# Patient Record
Sex: FEMALE | Race: WHITE | NOT HISPANIC OR LATINO | Employment: OTHER | URBAN - METROPOLITAN AREA
[De-identification: names, ages, dates, MRNs, and addresses within clinical notes are randomized per-mention and may not be internally consistent; named-entity substitution may affect disease eponyms.]

---

## 2024-01-15 PROBLEM — E21.3 HYPERPARATHYROIDISM: Status: ACTIVE | Noted: 2023-05-01

## 2024-01-23 ENCOUNTER — PATIENT MESSAGE (OUTPATIENT)
Dept: FAMILY MEDICINE CLINIC | Facility: CLINIC | Age: 74
End: 2024-01-23
Payer: MEDICARE

## 2024-01-23 DIAGNOSIS — R30.0 DYSURIA: Primary | ICD-10-CM

## 2024-01-25 ENCOUNTER — LAB (OUTPATIENT)
Dept: LAB | Facility: HOSPITAL | Age: 74
End: 2024-01-25
Payer: MEDICARE

## 2024-01-25 ENCOUNTER — OFFICE VISIT (OUTPATIENT)
Dept: ORTHOPEDIC SURGERY | Facility: CLINIC | Age: 74
End: 2024-01-25
Payer: MEDICARE

## 2024-01-25 VITALS
DIASTOLIC BLOOD PRESSURE: 68 MMHG | BODY MASS INDEX: 25.57 KG/M2 | HEIGHT: 61 IN | SYSTOLIC BLOOD PRESSURE: 102 MMHG | WEIGHT: 135.4 LBS

## 2024-01-25 DIAGNOSIS — M81.0 AGE-RELATED OSTEOPOROSIS WITHOUT CURRENT PATHOLOGICAL FRACTURE: ICD-10-CM

## 2024-01-25 DIAGNOSIS — M75.121 NONTRAUMATIC COMPLETE TEAR OF RIGHT ROTATOR CUFF: Primary | ICD-10-CM

## 2024-01-25 DIAGNOSIS — R30.0 DYSURIA: ICD-10-CM

## 2024-01-25 DIAGNOSIS — M25.511 CHRONIC RIGHT SHOULDER PAIN: ICD-10-CM

## 2024-01-25 DIAGNOSIS — M19.011 PRIMARY OSTEOARTHRITIS OF RIGHT SHOULDER: ICD-10-CM

## 2024-01-25 DIAGNOSIS — M67.921 TENDINOPATHY OF RIGHT BICEPS TENDON: ICD-10-CM

## 2024-01-25 DIAGNOSIS — M25.812 IMPINGEMENT OF LEFT SHOULDER: ICD-10-CM

## 2024-01-25 DIAGNOSIS — G89.29 CHRONIC RIGHT SHOULDER PAIN: ICD-10-CM

## 2024-01-25 PROCEDURE — 36415 COLL VENOUS BLD VENIPUNCTURE: CPT

## 2024-01-25 PROCEDURE — 87086 URINE CULTURE/COLONY COUNT: CPT

## 2024-01-25 PROCEDURE — 87186 SC STD MICRODIL/AGAR DIL: CPT

## 2024-01-25 PROCEDURE — 87077 CULTURE AEROBIC IDENTIFY: CPT

## 2024-01-25 PROCEDURE — 82306 VITAMIN D 25 HYDROXY: CPT

## 2024-01-25 NOTE — PROGRESS NOTES
[facility] Follow Up       Office Follow Up Visit       Patient Name: Colleen Morgan    Chief Complaint:   Chief Complaint   Patient presents with    Follow-up     1 month f/u; Injury of right shoulder       Referring Physician: No ref. provider found    History of Present Illness:   Colleen Morgan returns to clinic today for follow-up of right shoulder pain.  Last visit on 12/28/2023.  MRI of right shoulder was pending at that time.  Performed on 1/14/2024.  She says right shoulder pain has remained unchanged since last visit.    12/28/23  Ongoing for over 1 year.  Progressively worsened recently.  Initially started in February 2022.     Rates pain an 8/10.  Describes aching, shooting.  Worsens when lying on the right side.  She has tried physical therapy, injection and anti-inflammatories.     She was initially seen at [facility] for the right shoulder pain.  Referred to physical therapy.  She says the physical therapy did not help with her symptoms.  She then moved down to the [place] area to be with her now .  Referred here for further evaluation.     MRI ordered by her PCP scheduled for 1/14/2024    Subjective     Review of Systems   Constitutional:  Negative for chills, fever, unexpected weight gain and unexpected weight loss.   HENT:  Negative for congestion, postnasal drip and rhinorrhea.    Eyes:  Negative for blurred vision.   Respiratory:  Negative for shortness of breath.    Cardiovascular:  Negative for leg swelling.   Gastrointestinal:  Negative for abdominal pain, nausea and vomiting.   Genitourinary:  Negative for difficulty urinating.   Musculoskeletal:  Positive for arthralgias. Negative for gait problem, joint swelling and myalgias.   Skin:  Negative for skin lesions and wound.   Neurological:  Negative for dizziness, weakness, light-headedness and numbness.   Hematological:  Does not bruise/bleed  easily.   Psychiatric/Behavioral:  Negative for depressed mood.         I have reviewed and updated the following portions of the patient's history and review of systems: allergies, current medications, past family history, past medical history, past social history, past surgical history and problem list.    Medications:   Current Outpatient Medications:     Alcaftadine 0.25 % solution, Apply  to eye(s) as directed by provider., Disp: , Rfl:     atomoxetine (STRATTERA) 40 MG capsule, Take 1 capsule by mouth Daily., Disp: 90 capsule, Rfl: 0    Biotin 1 MG capsule, Take 1 tablet by mouth Daily., Disp: , Rfl:     buPROPion (WELLBUTRIN) 75 MG tablet, Take 1 tablet by mouth Daily., Disp: 90 tablet, Rfl: 0    butalbital-acetaminophen-caffeine (FIORICET, ESGIC) -40 MG per tablet, Take 1 tablet by mouth Every 4 (Four) Hours As Needed for Headache., Disp: , Rfl:     Cholecalciferol 50 MCG (2000 UT) capsule, Take  by mouth., Disp: , Rfl:     cycloSPORINE (RESTASIS) 0.05 % ophthalmic emulsion, Apply 1 drop to eye(s) as directed by provider Every 12 (Twelve) Hours., Disp: , Rfl:     dicyclomine (BENTYL) 10 MG/5ML syrup, , Disp: , Rfl:     estradiol (ESTRACE) 0.1 MG/GM vaginal cream, Insert 2 applicators into the vagina 2 (Two) Times a Week., Disp: 42.5 g, Rfl: 5    famotidine (PEPCID) 40 MG tablet, Take 1 tablet by mouth At Night As Needed for Heartburn., Disp: 90 tablet, Rfl: 1    levocetirizine (XYZAL) 5 MG tablet, Take 1 tablet by mouth Every Evening., Disp: 90 tablet, Rfl: 3    loteprednol (Lotemax) 0.5 % ophthalmic suspension, Apply 1 drop to eye(s) as directed by provider 3 (Three) Times a Day., Disp: , Rfl:     meloxicam (MOBIC) 15 MG tablet, , Disp: , Rfl:     montelukast (SINGULAIR) 10 MG tablet, Take 1 tablet by mouth., Disp: , Rfl:     naratriptan (AMERGE) 2.5 MG tablet, Take 1 tablet by mouth 1 (One) Time As Needed for Migraine. 2.5 mg at onset of headache, may repeat in 4 hours if needed, Disp: , Rfl:      "nebivolol (BYSTOLIC) 10 MG tablet, Take 1 tablet by mouth Daily., Disp: 90 tablet, Rfl: 1    Olmesartan-amLODIPine-HCTZ 40-5-25 MG tablet, Take 1 tablet by mouth Daily., Disp: 90 tablet, Rfl: 1    Omega-3 Fatty Acids (fish oil) 1200 MG capsule capsule, , Disp: , Rfl:     pantoprazole (PROTONIX) 40 MG EC tablet, Take 1 tablet by mouth Daily., Disp: , Rfl:     rosuvastatin (Crestor) 5 MG tablet, Take 1 tablet by mouth Daily., Disp: 90 tablet, Rfl: 1    TiZANidine (ZANAFLEX) 2 MG capsule, Take 1 capsule by mouth 3 (Three) Times a Day., Disp: , Rfl:     venlafaxine XR (EFFEXOR-XR) 75 MG 24 hr capsule, Take 3 capsules by mouth Daily., Disp: 270 capsule, Rfl: 1    Allergies:   Allergies   Allergen Reactions    Ace Inhibitors Anaphylaxis and Other (See Comments)     Taken with avelox - throat swelling    Fluticasone Other (See Comments)     \"Increases heart rate, dizziness, felt like something is not right\"    Moxifloxacin Other (See Comments)     When taken with With lisinopril- throat swelling    Pravastatin Myalgia     Stopped per Dr Ernandez due to leg cramps severe         Objective      Vital Signs:   Vitals:    01/25/24 1259   BP: 102/68   Weight: 61.4 kg (135 lb 6.4 oz)   Height: 154.9 cm (60.98\")       Ortho Exam:  General: no acute distress, comfortable  Vitals reviewed in chart    Musculoskeletal Exam:    SIDE: Right shoulder    Tenderness: Anterior biceps pain and rotator cuff    Range of motion measurements (degrees): 140/140/60/40  Painful arc of motion: Yes  Positive amelia's testing  Positive neer's  Positive speed's  Painful bicipital groove  No evidence of septic joint      Results Review:  MRI Shoulder Right Without Contrast  Narrative: MRI SHOULDER RIGHT WO CONTRAST    Date of Exam: 1/14/2024 1:52 PM EST    Indication: Shoulder pain, rotator cuff disorder suspected, xray done.     Comparison: Shoulder radiographs 12/28/2023    Technique:  Routine multiplanar/multisequence images of the right shoulder were " obtained without contrast administration.      Findings:  Acromioclavicular joint: Mild degenerative changes of the acromioclavicular joint. Mild  subacromial/subdeltoid bursal fluid/edema. Type I acromion. Os acromiale is absent.    Rotator cuff: Rotator cuff tendinopathy with high-grade articular-sided partial tear of the distal supraspinatus tendon with more focal full-thickness tear of the anterior distal fibers of the supraspinatus tendon. High-grade articular sided partial tear   of the distal superior fibers of the infraspinatus tendon. Tendinopathy of the subscapularis tendon. No tear or substantial tendinopathy of the teres minor tendon. No fatty atrophy or edematous changes of the the rotator cuff muscles.    Labrum: Degeneration and tearing of the labrum.    Biceps tendon: Intra-articular long head of biceps tendinopathy and partial tearing.    Joint: Moderate glenohumeral joint effusion with synovitis. Mild chondral thinning of the glenohumeral joint. No subchondral edema. No evidence of capsulitis.    Bones:  No fracture or marrow edema. No suspicious marrow replacing lesions.    Soft tissues: No soft tissue masses or fluid collections seen.  Impression: Impression:  Rotator cuff tendinopathy with high-grade articular-sided partial tear of the distal supraspinatus tendon with more focal full-thickness tear along the anterior distal fibers. This tear propagates posteriorly to involve the superior distal fibers of   infraspinatus as well. No focal muscular atrophy.    Intra-articular long head of biceps tendinopathy and partial tearing.    Moderate glenohumeral joint effusion with synovitis.    Electronically Signed: Kin Barrios MD    1/16/2024 1:17 PM EST    Workstation ID: [id number]         Assessment / Plan      Assessment:   Diagnoses and all orders for this visit:    1. Nontraumatic complete tear of right rotator cuff (Primary)  -     External Facility Surgical/Procedural Request; Future    2.  "Tendinopathy of right biceps tendon  -     External Facility Surgical/Procedural Request; Future    3. Primary osteoarthritis of right shoulder  -     External Facility Surgical/Procedural Request; Future    4. Impingement of left shoulder  -     External Facility Surgical/Procedural Request; Future    5. Chronic right shoulder pain  -     External Facility Surgical/Procedural Request; Future        Quality Metrics:   BMI:   BMI is >= 25 and <30. (Overweight) The following options were offered after discussion;: weight loss educational material (shared in after visit summary)       Tobacco:   Colleen Morgan  reports that she has never smoked. She has been exposed to tobacco smoke. She has never used smokeless tobacco..      MRI right shoulder 1/14/2024 reviewed today with Dr. Vann.  Evidence of focal full-thickness tearing of supraspinatus.  Partial tearing of subscapularis and infraspinatus.  Tendinopathy and partial tearing of biceps tendon.  Moderate glenohumeral joint arthritis with synovitis.    Risks and benefits of continued nonoperative management versus surgical management were discussed. The patient desires to proceed with operative intervention.    Rotator cuff repair, biceps tenodesis and sometimes subacromial decompression discussed. We discussed that sometimes the rotator cuff tear is not repairable and may require debridement or partial repair. The procedure is routinely done arthroscopically, but sometimes a larger incision needs to be made to complete the repair in an \"open\" fashion for rare cases.    Specific risks include pain, bleeding, infection, injury to surrounding nerve and blood vessels, fracture, failure or lack of healing of rotator cuff repair, incomplete pain relief, hardware failure, potential need for additional procedures, stiffness after surgery, and potential inability to restore range of motion and strength. Medical, anesthetic, and block complications were additionally discussed. "     General anesthesia is required, sling compliance, and compliance with physical therapy and/or a surgeon guided exercise program will be very important to the recovery process.    Discussed pain medications would be used in the early postoperative course.    Diagnoses and CPT Codes  1. Rotator cuff tear - Arthroscopic rotator cuff repair CPT Code 36495  2. Biceps tendinopathy - biceps tenodesis  3. Impingement right shoulder - subacromial decompression    Ultrasling provided today for postoperative use    We will obtain cardiac clearance -- cardiologist in [place] area. May need to establish with cardiologist here.    Follow Up:   RIGHT SHOULDER SURGERY    Sonia Quinones PA-C  [facility]    Dictated using Dragon Speech Recognition.

## 2024-01-26 LAB — 25(OH)D3 SERPL-MCNC: 41.8 NG/ML (ref 30–100)

## 2024-01-27 LAB — BACTERIA SPEC AEROBE CULT: ABNORMAL

## 2024-01-28 DIAGNOSIS — N30.00 ACUTE CYSTITIS WITHOUT HEMATURIA: Primary | ICD-10-CM

## 2024-01-28 RX ORDER — CEFDINIR 300 MG/1
300 CAPSULE ORAL 2 TIMES DAILY
Qty: 14 CAPSULE | Refills: 0 | Status: SHIPPED | OUTPATIENT
Start: 2024-01-28 | End: 2024-02-04

## 2024-02-19 ENCOUNTER — OUTSIDE FACILITY SERVICE (OUTPATIENT)
Dept: ORTHOPEDIC SURGERY | Facility: CLINIC | Age: 74
End: 2024-02-19
Payer: MEDICARE

## 2024-02-19 ENCOUNTER — DOCUMENTATION (OUTPATIENT)
Dept: ORTHOPEDIC SURGERY | Facility: CLINIC | Age: 74
End: 2024-02-19

## 2024-02-19 DIAGNOSIS — M19.011 PRIMARY OSTEOARTHRITIS OF RIGHT SHOULDER: ICD-10-CM

## 2024-02-19 DIAGNOSIS — Z98.890 STATUS POST RIGHT ROTATOR CUFF REPAIR: Primary | ICD-10-CM

## 2024-02-19 DIAGNOSIS — M25.812 IMPINGEMENT OF LEFT SHOULDER: ICD-10-CM

## 2024-02-19 DIAGNOSIS — M67.921 TENDINOPATHY OF RIGHT BICEPS TENDON: ICD-10-CM

## 2024-02-19 DIAGNOSIS — M75.121 NONTRAUMATIC COMPLETE TEAR OF RIGHT ROTATOR CUFF: ICD-10-CM

## 2024-02-19 PROCEDURE — 29827 SHO ARTHRS SRG RT8TR CUF RPR: CPT | Performed by: ORTHOPAEDIC SURGERY

## 2024-02-19 PROCEDURE — 29828 SHO ARTHRS SRG BICP TENODSIS: CPT | Performed by: ORTHOPAEDIC SURGERY

## 2024-02-19 PROCEDURE — 29823 SHO ARTHRS SRG XTNSV DBRDMT: CPT | Performed by: ORTHOPAEDIC SURGERY

## 2024-02-19 PROCEDURE — 29823 SHO ARTHRS SRG XTNSV DBRDMT: CPT

## 2024-02-19 PROCEDURE — 29826 SHO ARTHRS SRG DECOMPRESSION: CPT | Performed by: ORTHOPAEDIC SURGERY

## 2024-02-19 PROCEDURE — 29827 SHO ARTHRS SRG RT8TR CUF RPR: CPT

## 2024-02-19 PROCEDURE — 29828 SHO ARTHRS SRG BICP TENODSIS: CPT

## 2024-02-19 PROCEDURE — 29826 SHO ARTHRS SRG DECOMPRESSION: CPT

## 2024-02-19 RX ORDER — HYDROCODONE BITARTRATE AND ACETAMINOPHEN 10; 325 MG/1; MG/1
1 TABLET ORAL EVERY 4 HOURS PRN
Qty: 42 TABLET | Refills: 0 | Status: SHIPPED | OUTPATIENT
Start: 2024-02-19 | End: 2024-02-26

## 2024-02-19 RX ORDER — ONDANSETRON 4 MG/1
4 TABLET, FILM COATED ORAL EVERY 6 HOURS PRN
Qty: 30 TABLET | Refills: 1 | Status: SHIPPED | OUTPATIENT
Start: 2024-02-19 | End: 2024-02-27

## 2024-02-19 NOTE — PROGRESS NOTES
Operative Report     Side/SHOULDER: RIGHT SHOULDER    LOCATION:   [facility]  [address] [place] [address]    [facility] OPERATIVE REPORT       Surgeon: Regan Vann MD     Assistant: DARINEL De La Fuente     The skilled assistance of the above noted first assistant was necessary during this complex surgical procedure.  The surgical assistant assisted with every aspect of the operation including, but not limited to, proper and safe positioning of the patient, obtaining adequate surgical exposure, manipulation of surgical instruments, suture management, surgical knot tying when necessary, the continual process of hemostasis during the procedure itself in addition to surgical wound closure and removal of the patient from the operating table and returning the patient back to the [facility].  The assistance of the surgical assistant allowed me to perform the most sensitive and technical potions of this operation using 2 hands, thus enhancing efficiency and patient safety.  This would not be possible without the help of a skilled assistant familiar with the procedure and capable of safely performing the aforementioned tasks.     Date of Surgery: 2/19/2024  Shoulder: RIGHT shoulder  Preoperative Diagnosis:  1.     Rotator cuff tear, chronic dysplastic - treated with arthroscopic rotator cuff repair - CPT 55304  2.     Biceps tendinopathy, partial tearing, SLAP tearing - treated with arthroscopic biceps tenodesis - CPT 88033  3.     Glenohumeral joint arthritis - treated with arthroscopic extensive debridement - CPT 17486  4.     Shoulder impingement syndrome - treated with arthroscopic subacromial decompression with acromioplasty - CPT 90094     Postoperative Diagnosis:  1.     SAME as preoperative diagnoses     Procedure:  1.     Arthroscopic rotator cuff repair (1x2) - CPT 41149  2.     Arthroscopic biceps tenodesis (8-8) - CPT 53430  3.    Arthroscopic  extensive debridement - CPT 21381  4.     Arthroscopic subacromial decompression with acromioplasty - CPT 46303        Admission status: elective outpatient surgery     COVID-19 Statement: The patient understands that the surgery is elective surgery.  Patient is aware of the ongoing COVID-19 pandemic and potential implications.     Complications: None     EBL: 10mL     Specimen: NONE     Anesthesia: general anesthesia     Block: regional interscalene block with single shot per anesthesia     Antibiotics: weight based IV antibiotics infused prior to incision     Time out: Time out was called and the patient, side, site, and intended procedures were confirmed.     Counts: Needle and sponge counts were correct prior to closure.     DVT prophylaxis: The patient will be weight bearing as tolerated on bilateral lower extremities postoperatively with no additional chemical DVT prophylaxis indicated.     Marked: The patient was marked with an indelible marker in the preoperative holding area confirming the correct side and site.     History & Physical:   A history and physical examination was completed and updated in the preoperative holding area.     Consent: The patient signed the consent form for surgery with an understanding of the risks and benefits as outlined in the clinic and in the preoperative holding area.     Risks and Benefits: Specific risks and benefits discussed included - pain, bleeding, infection, injury to nerves or blood vessels, fracture, stiffness, failure to heal, revision surgery, deformity of biceps or asymmetry, complications of the block, anesthetic complications, and medical complications associated with surgery.        Indications for surgery:  The patient has history, physical examination, and radiographic findings confirming the diagnoses.  The patient has persistent pain and functional loss unresponsive to non-operative treatment consisting of selective rest/activity modification,  medications, and exercises.  MRI documented the status of the rotator cuff - rotator cuff tearing was noted.     Impingement syndrome - the patient had history and clinical exam findings consistent with shoulder impingement syndrome.  Additionally, the patient had subacromial bursitis which was encountered during the arthroscopic shoulder procedure.  Subacromial decompression with minimal acromioplasty was indicated as part of the treatment of impingement syndrome, and additionally to enhance arthroscopic visualization to enable minimally invasive, arthroscopic rotator cuff repair.    Great discussion with the patient in the office and again in preop today.  She has fairly significant glenohumeral joint arthritis.  She did have some baseline anterior static subluxation on her axillary image but her subscapularis was intact on MRI and at the time of surgery.  She had chronic dysplastic full-thickness tearing the supraspinatus extending posteriorly.  She had partial tearing of the long head of the biceps with severe pathology.  She also had significant glenohumeral joint arthritis.  She understands that some of the arthritic pain can persist despite debridement but felt that she is a better candidate for arthroscopic surgery rather than reverse shoulder arthroplasty at this time based on MRI findings and functional status.        Operative Findings:  Rotator Cuff Tissue Quality: Chronic dysplastic tissues     Status of the Rotator Cuff:  Supraspinatus -full-thickness tearing with posterior extension    Size of Rotator Cuff Tear:  Supraspinatus -14 to 16 mm     Rotator Cuff Repair Construct:  1x2 Transosseous Equivalent Double Row Arthroscopic Rotator Cuff Repair      Rotator Cuff Implants:  Medial Row: 1 triple loaded [employer] 5.5mm Healicoil PEEK-all 6 suture limbs were utilized  Lateral Row: 2 Lateral Anchors - [employer] 5.5mm Footprint PEEK      Bone Quality: Despite advanced age she had excellent anchor  fixation for all 3 anchors and the biceps tendon.     Labrum: Degenerative SLAP tearing, degenerative labral tearing fairly circumferentially     Biceps: tendinopathy and synovitis and partial rotator cuff tearing     Biceps Tenodesis Construct:  Suprapectoral biceps tenodesis in the bicipital groove     Biceps Tendon Implant:  Arthrex SwiveLock Biceps Tenodesis BioComposite screw  Drill Size: 8mm  Screw Size: 8mm     Humeral Head: Grade 4 changes  Glenoid: Grade 4 changes     Contracture: Negative despite OA  Pathological laxity: Negative     Procedure in detail:  Regional interscalene block was completed in the preoperative area by the anesthesia team.  The patient was supine on the operative table and the anesthesia team initiated general anesthesia.  The patient was placed in a modified beach chair position with the neck secured in neutral alignment and all bony prominences were well padded.     The shoulder was assessed with an examination under anesthesia.     The operative extremity was cleaned with alcohol and then the extremity was prepped and draped in the standard fashion.  The surgical arm was placed into a well-padded arm mohr. A standard posterior portal was created with an incision made 1 cm inferior 1 cm medial to the posterolateral acromial corner.  A blunt metal trocar and cannula were inserted into the glenohumeral joint.  The arthroscopic pump was connected and the above findings and diagnoses were noted as part of the diagnostic shoulder arthroscopy.       A spinal needle was used to localize the anterior portal position in the rotator interval. A 5.5mm plastic cannula and trocar were inserted followed by a 4.5mm shaver/cautery device.  The shaver was used for debridement in the glenohumeral joint.  Arthroscopic scissors were used to perform biceps tenotomy of the pathologic biceps tendon prior to subsequent biceps tenodesis.  The remaining intra-articular portion of the biceps tendon was  debrided using the shaver/cautery device.    Extensive debridement was necessary within the glenohumeral joint.  Extensive debridement was performed using the shaver and cautery device for chondroplasty on the glenoid side, chondroplasty on the humeral side, debridement of chronic labral tearing, debridement of synovitis within the rotator interval.     The arthroscope and metal cannula were then removed in order to transition to the subacromial space.  The blunt metal trocar and cannula were inserted into the subacromial space and the lateral portal site identified with a spinal needle.  An 8 mm plastic cannula and trocar were inserted.  I turned my attention to the arthroscopic subacromial decompression with acromioplasty. Bursitis was noted in the subacromial space and impacted visualization of the rotator cuff.  The 4.5mm shaver/cautery device was used to clear the subacromial space until the acromion could be identified and bursal resection was completed to allow rotator cuff visualization.  The shaver was used to remove fibrous tissue from the acromial undersurface until the anterolateral and anterior medial corners of the acromion were clearly identified.  The coracoacromial ligament was not released but CA ligament tearing was noted and debrided as part of the subacromial decompression.  The shaver was used to perform a minimal acromioplasty.  The shaver/cautery device was used to perform the subacromial decompression with minimal acromioplasty.     I turned my attention to the arthroscopic biceps tenodesis. The arm was placed in a slightly external rotator position and the elbow flexed and shoulder forward flexed into a biceps tendon repair position.  The biceps tendon repair position in achieved in order to try to maintain proper biceps tendon length-tension relationship during the repair.  The biceps sheath was opened using the shaver/cautery device and the pathologic biceps tendon was visualized.  The  appropriately sized drill bit was used to create a drill hole for the tendon above the pectoralis major tendon and within the bicipital groove.  The biceps tendon was placed into the drill hole and the screw inserted and tested.  The arthroscopic biceps tenodesis was completed and the repair was noted to be dynamically stable with a solid repair.     I turned my attention to the rotator cuff tear and the arthroscopic rotator cuff repair.  The torn rotator cuff tendon was grasped with a handheld grasper and assessed for mobility and integrity.  The soft tissue at the greater tuberosity insertion site was removed and a power shaver was used to create a healthy bleeding bed to enhance rotator cuff tendon healing.     Arthroscopic rotator cuff suture anchors were then inserted for the rotator cuff repair.  Chronic dysplastic features noted.  The single medial row arthroscopic rotator cuff repair anchor was inserted and the sutures from the anchors were withdrawn through the anterior cannula. An arthroscopic suture passer was used to place the high strength sutures through the rotator cuff tendon in an inverted mattress fashion. The sutures were then inserted laterally into 2 lateral knotless anchors in a cruciform pattern with appropriate tensioning.  The completed arthroscopic rotator cuff repair was inspected dynamically and the arthroscopic instruments removed along with the arthroscopic fluid. The incisions were closed with nylon suture and steri-strips were placed over the incisions.  A sterile dressing was applied followed by a neutral rotation sling.  The patient tolerated the procedure well and was transported to the recovery room in satisfactory condition.       Rotator Cuff Repair - POSTOPERATIVE PLAN:  Follow-up in the office in 2 weeks with 1 view of the operative shoulder at that time  Sutures: Nylon sutures to come out in 2 weeks  DVT prophylaxis: No additional chemical DVT prophylaxis  indicated  Weightbearing: Nonweightbearing on operative extremity, no biceps loading, pendulums/elbow/wrist/hand motion encouraged  Neutral rotation sling for 3 to 4 weeks following the surgery  Physical therapy: Formal outpatient physical therapy will be provided at the 2-week appointment in the office.  Rotator cuff repair protocol      Electronically signed by Regan Vann MD, 02/19/24, 10:14 AM EST.

## 2024-02-23 ENCOUNTER — TELEPHONE (OUTPATIENT)
Age: 74
End: 2024-02-23
Payer: MEDICARE

## 2024-02-23 NOTE — TELEPHONE ENCOUNTER
I harjinder the items below are for surgery and I will let the patient know that.  Are you ok if she goes to [place] to recoup? See Below    Aaliyah OVALLES (R)      From Patient:    Nataliya, My brother and sister in law have invited me to recuperate at their [place] vacation home. Would that be permissable?

## 2024-02-23 NOTE — TELEPHONE ENCOUNTER
Yes- those listed items are the requested materials for surgery. Thank you for relaying that.    Recovery in [place] is certainly reasonable. Is she asking about going now? It is usually best if we can see her at the 2 week postop appointment to go over instructions and provide physical therapy order. Hopefully she can find a PT clinic while there.    Thank you,  Sonia Quinones PA-C         Sent message to patient via [employer] with Sonia's comments about holding off to go to [place] until after PO appt.  I also let the patient know that the list of items was for surgery and to ignore that.    Aaliyah JAY (GUZMAN) ROT

## 2024-02-23 NOTE — TELEPHONE ENCOUNTER
----- Message from Colleen Kimball sent at 2/22/2024 10:18 PM EST -----  Regarding: [place]   Contact: [phone]  Sonia I received a detailed message on my chart. It was a list of recommendations for a beach chair with hydraulic arm, cannulas etc. there is no explanation as to what they are for or directions for use. Please advise. Thanks!

## 2024-03-05 ENCOUNTER — OFFICE VISIT (OUTPATIENT)
Dept: ORTHOPEDIC SURGERY | Facility: CLINIC | Age: 74
End: 2024-03-05
Payer: MEDICARE

## 2024-03-05 VITALS — TEMPERATURE: 97.1 F

## 2024-03-05 DIAGNOSIS — Z98.890 STATUS POST RIGHT ROTATOR CUFF REPAIR: Primary | ICD-10-CM

## 2024-03-05 PROCEDURE — 99024 POSTOP FOLLOW-UP VISIT: CPT

## 2024-03-05 NOTE — PROGRESS NOTES
[facility] [place] [facility] Office Visit - Sonia Quinones PA-C    Office Visit       Patient Name: Colleen Kimball    Chief Complaint:   Chief Complaint   Patient presents with    Post-op     2 weeks s/p arthroscopic rotator cuff repair, arthroscopic biceps tenodesis, arthroscopic extensive debridement, arthroscopic subacromial decompression with acromioplasty, Right; DOS (02/19/2024)       Referring Physician: No ref. provider found    History of Present Illness:   Colleen Kimball is a 73 y.o. female who presents for 2-week postop visit s/p right rotator cuff repair, biceps tenodesis, extensive debridement, subacromial decompression with Dr. Vann.  Here with supportive .  She reports to be doing well.  Pain has been controlled.  She has been wearing her sling as instructed.    Procedure:  1.     Arthroscopic rotator cuff repair (1x2) - CPT 84181  2.     Arthroscopic biceps tenodesis (8-8) - CPT 95809  3.    Arthroscopic extensive debridement - CPT 38644  4.     Arthroscopic subacromial decompression with acromioplasty - CPT 41640    Subjective     Review of Systems     Past Medical History: History reviewed. No pertinent past medical history.    Past Surgical History:   Past Surgical History:   Procedure Laterality Date    APPENDECTOMY  1968    CATARACT EXTRACTION Bilateral 2012    HYSTERECTOMY  1996    SHOULDER ARTHROSCOPY Right 02/19/2024    arthroscopic rotator cuff repair, arthroscopic biceps tenodesis, arthroscopic extensive debridement, arthroscopic subacromial decompression with acromioplasty, Dr. Regan Vann    SINUS SURGERY      x 2    TONSILLECTOMY AND ADENOIDECTOMY         Family History:   Family History   Problem Relation Age of Onset    Diabetes Mother     Arthritis Mother     Hypertension Mother     Osteoarthritis Mother     Heart failure Father     Bipolar disorder Father        Social History:   Social History      Socioeconomic History    Marital status:    Tobacco Use    Smoking status: Never     Passive exposure: Past    Smokeless tobacco: Never   Substance and Sexual Activity    Alcohol use: Yes     Alcohol/week: 7.0 standard drinks of alcohol     Types: 7 Glasses of wine per week     Comment: One time daily    Drug use: Never    Sexual activity: Defer       Medications:   Current Outpatient Medications:     Alcaftadine 0.25 % solution, Apply  to eye(s) as directed by provider., Disp: , Rfl:     atomoxetine (STRATTERA) 40 MG capsule, Take 1 capsule by mouth Daily., Disp: 90 capsule, Rfl: 0    Biotin 1 MG capsule, Take 1 tablet by mouth Daily., Disp: , Rfl:     buPROPion (WELLBUTRIN) 75 MG tablet, Take 1 tablet by mouth Daily., Disp: 90 tablet, Rfl: 0    butalbital-acetaminophen-caffeine (FIORICET, ESGIC) -40 MG per tablet, Take 1 tablet by mouth Every 4 (Four) Hours As Needed for Headache., Disp: , Rfl:     Cholecalciferol 50 MCG (2000 UT) capsule, Take  by mouth., Disp: , Rfl:     cycloSPORINE (RESTASIS) 0.05 % ophthalmic emulsion, Apply 1 drop to eye(s) as directed by provider Every 12 (Twelve) Hours., Disp: , Rfl:     dicyclomine (BENTYL) 10 MG/5ML syrup, , Disp: , Rfl:     estradiol (ESTRACE) 0.1 MG/GM vaginal cream, Insert 2 applicators into the vagina 2 (Two) Times a Week., Disp: 42.5 g, Rfl: 5    famotidine (PEPCID) 40 MG tablet, Take 1 tablet by mouth At Night As Needed for Heartburn., Disp: 90 tablet, Rfl: 1    levocetirizine (XYZAL) 5 MG tablet, Take 1 tablet by mouth Every Evening., Disp: 90 tablet, Rfl: 3    loteprednol (Lotemax) 0.5 % ophthalmic suspension, Apply 1 drop to eye(s) as directed by provider 3 (Three) Times a Day., Disp: , Rfl:     meloxicam (MOBIC) 15 MG tablet, , Disp: , Rfl:     montelukast (SINGULAIR) 10 MG tablet, Take 1 tablet by mouth., Disp: , Rfl:     naratriptan (AMERGE) 2.5 MG tablet, Take 1 tablet by mouth 1 (One) Time As Needed for Migraine. 2.5 mg at onset of  "headache, may repeat in 4 hours if needed, Disp: , Rfl:     nebivolol (BYSTOLIC) 10 MG tablet, Take 1 tablet by mouth Daily., Disp: 90 tablet, Rfl: 1    Olmesartan-amLODIPine-HCTZ 40-5-25 MG tablet, Take 1 tablet by mouth Daily., Disp: 90 tablet, Rfl: 1    Omega-3 Fatty Acids (fish oil) 1200 MG capsule capsule, , Disp: , Rfl:     pantoprazole (PROTONIX) 40 MG EC tablet, Take 1 tablet by mouth Daily., Disp: , Rfl:     rosuvastatin (Crestor) 5 MG tablet, Take 1 tablet by mouth Daily., Disp: 90 tablet, Rfl: 1    TiZANidine (ZANAFLEX) 2 MG capsule, Take 1 capsule by mouth 3 (Three) Times a Day., Disp: , Rfl:     venlafaxine XR (EFFEXOR-XR) 75 MG 24 hr capsule, Take 3 capsules by mouth Daily., Disp: 270 capsule, Rfl: 1    Allergies:   Allergies   Allergen Reactions    Ace Inhibitors Anaphylaxis and Other (See Comments)     Taken with avelox - throat swelling    Fluticasone Other (See Comments)     \"Increases heart rate, dizziness, felt like something is not right\"    Moxifloxacin Other (See Comments)     When taken with With lisinopril- throat swelling    Pravastatin Myalgia     Stopped per Dr Ernandez due to leg cramps severe       I have reviewed and updated the following portions of the patient's history and review of systems: allergies, current medications, past family history, past medical history, past social history, past surgical history and problem list.    Objective      Vital Signs:   Vitals:    03/05/24 1306   Temp: 97.1 °F (36.2 °C)       Ortho Exam:  General: comfortable  Vascular: 2+ radial pulse  Neurologic: sensation to light touch is intact distally, elbow flexion/elbow extension/wrist flexion/wrist extension/hand intrinsics intact, able to fire deltoid; no residual defects noted from the block  Dermatologic: surgical incisions are well appearing, with no drainage or surrounding erythema; no signs or symptoms of infection or DVT      Results Review:   XR Shoulder 1 View Right  Imaging: shoulder x-rays 1 " view - AP x-ray views    Side: RIGHT SHOULDER    Indication for shoulder x-ray 1 view: shoulder pain, postop evaluation   following surgery    Comparison: the current xray was compared to preoperative imaging and   indicates expected postoperative changes.    Findings: No acute bony pathology. Well appearing and well positioned   compared to preoperative imaging.  Located and no fracture noted.  Baseline degenerative arthritic findings noted again and noted   intraoperatively.    I personally reviewed the above x-rays.         Assessment / Plan      Assessment:  Diagnoses and all orders for this visit:    1. Status post right rotator cuff repair (Primary)  -     Cancel: XR Shoulder 1 View Right  -     Ambulatory Referral to Physical Therapy Evaluate and treat, Ortho, POST OP        Quality Metrics:   BMI:   BMI is >= 25 and <30. (Overweight) The following options were offered after discussion;: weight loss educational material (shared in after visit summary)       Tobacco:   Colleen Kimball  reports that she has never smoked. She has been exposed to tobacco smoke. She has never used smokeless tobacco..          Plan:  Doing well 2 weeks s/p right rotator cuff repair, biceps tenodesis, extensive debridement, subacromial decompression with Dr. Vann.  We reviewed intraoperative photographs today.  Solid 1x2 rotator cuff repair despite chronic dysplastic features.   She may continue with over-the-counter anti-inflammatories as needed for pain and swelling.  She will continue sling use for an additional week or 2.  May take breaks as needed.  Referral provided today for physical therapy.  Protocol given.  She will start range of motion exercises.  Remain nonweightbearing on right side.  Sutures removed today.    History, diagnosis and treatment plan discussed with Dr. Vann.    Follow Up:   2 months    Sonia Quinones PA-C  [facility] [place] [facility]       Dictated using Dragon Speech Recognition.

## 2024-03-30 DIAGNOSIS — N95.2 VAGINAL ATROPHY: ICD-10-CM

## 2024-04-01 RX ORDER — ESTRADIOL 0.1 MG/G
2 CREAM VAGINAL 2 TIMES WEEKLY
Qty: 42.5 G | Refills: 5 | Status: SHIPPED | OUTPATIENT
Start: 2024-04-01

## 2024-04-01 NOTE — TELEPHONE ENCOUNTER
Rx Refill Note  Requested Prescriptions     Pending Prescriptions Disp Refills    estradiol (ESTRACE) 0.1 MG/GM vaginal cream 42.5 g 5     Sig: Insert 2 g into the vagina 2 (Two) Times a Week.      Last office visit with prescribing clinician: 1/15/2024   Last telemedicine visit with prescribing clinician: Visit date not found   Next office visit with prescribing clinician: Visit date not found   {    Kathleen Sloan MA  04/01/24, 09:00 EDT

## 2024-05-03 ENCOUNTER — PATIENT MESSAGE (OUTPATIENT)
Dept: FAMILY MEDICINE CLINIC | Facility: CLINIC | Age: 74
End: 2024-05-03
Payer: MEDICARE

## 2024-05-03 DIAGNOSIS — J30.2 SEASONAL ALLERGIES: ICD-10-CM

## 2024-05-03 DIAGNOSIS — F33.0 MILD EPISODE OF RECURRENT MAJOR DEPRESSIVE DISORDER: ICD-10-CM

## 2024-05-03 DIAGNOSIS — F98.8 ATTENTION DEFICIT DISORDER, UNSPECIFIED HYPERACTIVITY PRESENCE: Chronic | ICD-10-CM

## 2024-05-03 DIAGNOSIS — E78.2 MIXED HYPERLIPIDEMIA: ICD-10-CM

## 2024-05-03 DIAGNOSIS — N95.2 VAGINAL ATROPHY: ICD-10-CM

## 2024-05-06 RX ORDER — MONTELUKAST SODIUM 10 MG/1
10 TABLET ORAL NIGHTLY
Qty: 90 TABLET | Refills: 0 | Status: SHIPPED | OUTPATIENT
Start: 2024-05-06

## 2024-05-06 RX ORDER — LEVOCETIRIZINE DIHYDROCHLORIDE 5 MG/1
5 TABLET, FILM COATED ORAL EVERY EVENING
Qty: 90 TABLET | Refills: 0 | Status: SHIPPED | OUTPATIENT
Start: 2024-05-06 | End: 2024-05-09 | Stop reason: SDUPTHER

## 2024-05-06 RX ORDER — BUPROPION HYDROCHLORIDE 75 MG/1
75 TABLET ORAL DAILY
Qty: 90 TABLET | Refills: 0 | OUTPATIENT
Start: 2024-05-06

## 2024-05-06 RX ORDER — ATOMOXETINE 40 MG/1
40 CAPSULE ORAL DAILY
Qty: 30 CAPSULE | Refills: 0 | Status: SHIPPED | OUTPATIENT
Start: 2024-05-06

## 2024-05-06 RX ORDER — ATOMOXETINE 40 MG/1
40 CAPSULE ORAL DAILY
Qty: 90 CAPSULE | Refills: 0 | OUTPATIENT
Start: 2024-05-06

## 2024-05-06 RX ORDER — MELOXICAM 15 MG/1
15 TABLET ORAL DAILY
Qty: 90 TABLET | Refills: 0 | Status: SHIPPED | OUTPATIENT
Start: 2024-05-06

## 2024-05-06 RX ORDER — BUPROPION HYDROCHLORIDE 75 MG/1
75 TABLET ORAL DAILY
Qty: 30 TABLET | Refills: 0 | Status: SHIPPED | OUTPATIENT
Start: 2024-05-06

## 2024-05-06 RX ORDER — ROSUVASTATIN CALCIUM 5 MG/1
5 TABLET, COATED ORAL DAILY
Qty: 30 TABLET | Refills: 0 | Status: SHIPPED | OUTPATIENT
Start: 2024-05-06

## 2024-05-06 RX ORDER — PANTOPRAZOLE SODIUM 40 MG/1
40 TABLET, DELAYED RELEASE ORAL DAILY
Qty: 90 TABLET | Refills: 0 | Status: SHIPPED | OUTPATIENT
Start: 2024-05-06

## 2024-05-06 RX ORDER — ESTRADIOL 0.1 MG/G
2 CREAM VAGINAL 2 TIMES WEEKLY
Qty: 42.5 G | Refills: 2 | Status: SHIPPED | OUTPATIENT
Start: 2024-05-06

## 2024-05-08 ENCOUNTER — OFFICE VISIT (OUTPATIENT)
Dept: FAMILY MEDICINE CLINIC | Facility: CLINIC | Age: 74
End: 2024-05-08
Payer: MEDICARE

## 2024-05-08 VITALS
SYSTOLIC BLOOD PRESSURE: 132 MMHG | OXYGEN SATURATION: 97 % | HEART RATE: 61 BPM | DIASTOLIC BLOOD PRESSURE: 74 MMHG | WEIGHT: 140 LBS | HEIGHT: 61 IN | BODY MASS INDEX: 26.43 KG/M2

## 2024-05-08 DIAGNOSIS — N30.00 ACUTE CYSTITIS WITHOUT HEMATURIA: ICD-10-CM

## 2024-05-08 DIAGNOSIS — R30.0 DYSURIA: Primary | ICD-10-CM

## 2024-05-08 LAB
BILIRUB BLD-MCNC: NEGATIVE MG/DL
CLARITY, POC: ABNORMAL
COLOR UR: YELLOW
EXPIRATION DATE: ABNORMAL
GLUCOSE UR STRIP-MCNC: NEGATIVE MG/DL
KETONES UR QL: NEGATIVE
LEUKOCYTE EST, POC: ABNORMAL
Lab: ABNORMAL
NITRITE UR-MCNC: POSITIVE MG/ML
PH UR: 6 [PH] (ref 5–8)
PROT UR STRIP-MCNC: ABNORMAL MG/DL
RBC # UR STRIP: NEGATIVE /UL
SP GR UR: 1.01 (ref 1–1.03)
UROBILINOGEN UR QL: NORMAL

## 2024-05-08 PROCEDURE — 1159F MED LIST DOCD IN RCRD: CPT | Performed by: PHYSICIAN ASSISTANT

## 2024-05-08 PROCEDURE — 99213 OFFICE O/P EST LOW 20 MIN: CPT | Performed by: PHYSICIAN ASSISTANT

## 2024-05-08 PROCEDURE — 81003 URINALYSIS AUTO W/O SCOPE: CPT | Performed by: PHYSICIAN ASSISTANT

## 2024-05-08 PROCEDURE — 3075F SYST BP GE 130 - 139MM HG: CPT | Performed by: PHYSICIAN ASSISTANT

## 2024-05-08 PROCEDURE — 3078F DIAST BP <80 MM HG: CPT | Performed by: PHYSICIAN ASSISTANT

## 2024-05-08 PROCEDURE — 1126F AMNT PAIN NOTED NONE PRSNT: CPT | Performed by: PHYSICIAN ASSISTANT

## 2024-05-08 PROCEDURE — 87186 SC STD MICRODIL/AGAR DIL: CPT | Performed by: PHYSICIAN ASSISTANT

## 2024-05-08 PROCEDURE — 87088 URINE BACTERIA CULTURE: CPT | Performed by: PHYSICIAN ASSISTANT

## 2024-05-08 PROCEDURE — 87086 URINE CULTURE/COLONY COUNT: CPT | Performed by: PHYSICIAN ASSISTANT

## 2024-05-08 PROCEDURE — 1160F RVW MEDS BY RX/DR IN RCRD: CPT | Performed by: PHYSICIAN ASSISTANT

## 2024-05-08 RX ORDER — OMEPRAZOLE 20 MG/1
20 CAPSULE, DELAYED RELEASE ORAL DAILY
COMMUNITY

## 2024-05-08 RX ORDER — SUMATRIPTAN 25 MG/1
25 TABLET, FILM COATED ORAL
COMMUNITY

## 2024-05-08 RX ORDER — CEPHALEXIN 500 MG/1
500 CAPSULE ORAL 2 TIMES DAILY
Qty: 14 CAPSULE | Refills: 0 | Status: SHIPPED | OUTPATIENT
Start: 2024-05-08 | End: 2024-05-15

## 2024-05-09 ENCOUNTER — OFFICE VISIT (OUTPATIENT)
Dept: ORTHOPEDIC SURGERY | Facility: CLINIC | Age: 74
End: 2024-05-09
Payer: MEDICARE

## 2024-05-09 DIAGNOSIS — Z98.890 STATUS POST RIGHT ROTATOR CUFF REPAIR: Primary | ICD-10-CM

## 2024-05-09 PROCEDURE — 99024 POSTOP FOLLOW-UP VISIT: CPT

## 2024-05-09 RX ORDER — LEVOCETIRIZINE DIHYDROCHLORIDE 5 MG/1
5 TABLET, FILM COATED ORAL EVERY EVENING
Qty: 30 TABLET | Refills: 0 | Status: SHIPPED | OUTPATIENT
Start: 2024-05-09

## 2024-05-09 NOTE — PROGRESS NOTES
[facility] [place] [facility] Office Follow Up       Office Follow Up Visit       Patient Name: Colleen Kimball    Chief Complaint:   Chief Complaint   Patient presents with    Post-op     2 month follow up -- 2.5 months status post arthroscopic rotator cuff repair, arthroscopic biceps tenodesis, arthroscopic extensive debridement, arthroscopic subacromial decompression with acromioplasty, Right; DOS (02/19/2024)       Referring Physician: No ref. provider found    History of Present Illness:   Colleen Kimball returns to clinic today for postop visit 2.5 months s/p right shoulder arthroscopic rotator cuff repair, biceps tenodesis, extensive debridement, subacromial decompression with Dr. Vann.  She reports to be overall doing very well.  No pain with the right shoulder at this time.  She has been completing physical therapy at [facility] in [place].  Seeing great progress with range of motion.    Procedure:  1.     Arthroscopic rotator cuff repair (1x2) - CPT 76817  2.     Arthroscopic biceps tenodesis (8-8) - CPT 24301  3.    Arthroscopic extensive debridement - CPT 40817  4.     Arthroscopic subacromial decompression with acromioplasty - CPT 76977      Subjective     Review of Systems   Constitutional: Negative.  Negative for chills, fatigue and fever.   HENT: Negative.  Negative for congestion and dental problem.    Eyes: Negative.  Negative for blurred vision.   Respiratory: Negative.  Negative for shortness of breath.    Cardiovascular: Negative.  Negative for leg swelling.   Gastrointestinal: Negative.  Negative for abdominal pain.   Endocrine: Negative.  Negative for polyuria.   Genitourinary: Negative.  Negative for difficulty urinating.   Musculoskeletal:  Positive for arthralgias.   Skin: Negative.    Allergic/Immunologic: Negative.    Neurological: Negative.    Hematological: Negative.  Negative for adenopathy.   Psychiatric/Behavioral: Negative.   Negative for behavioral problems.         I have reviewed and updated the following portions of the patient's history and review of systems: allergies, current medications, past family history, past medical history, past social history, past surgical history and problem list.    Medications:   Current Outpatient Medications:     Alcaftadine 0.25 % solution, Apply  to eye(s) as directed by provider., Disp: , Rfl:     atomoxetine (STRATTERA) 40 MG capsule, Take 1 capsule by mouth Daily., Disp: 30 capsule, Rfl: 0    Biotin 1 MG capsule, Take 1 tablet by mouth Daily., Disp: , Rfl:     buPROPion (WELLBUTRIN) 75 MG tablet, Take 1 tablet by mouth Daily., Disp: 30 tablet, Rfl: 0    butalbital-acetaminophen-caffeine (FIORICET, ESGIC) -40 MG per tablet, Take 1 tablet by mouth Every 4 (Four) Hours As Needed for Headache., Disp: , Rfl:     cephalexin (Keflex) 500 MG capsule, Take 1 capsule by mouth 2 (Two) Times a Day for 7 days., Disp: 14 capsule, Rfl: 0    Cholecalciferol 50 MCG (2000 UT) capsule, Take  by mouth., Disp: , Rfl:     cycloSPORINE (RESTASIS) 0.05 % ophthalmic emulsion, Apply 1 drop to eye(s) as directed by provider Every 12 (Twelve) Hours., Disp: , Rfl:     dicyclomine (BENTYL) 10 MG/5ML syrup, , Disp: , Rfl:     estradiol (ESTRACE) 0.1 MG/GM vaginal cream, Insert 2 g into the vagina 2 (Two) Times a Week., Disp: 42.5 g, Rfl: 2    famotidine (PEPCID) 40 MG tablet, Take 1 tablet by mouth At Night As Needed for Heartburn., Disp: 90 tablet, Rfl: 1    levocetirizine (XYZAL) 5 MG tablet, Take 1 tablet by mouth Every Evening., Disp: 30 tablet, Rfl: 0    loteprednol (Lotemax) 0.5 % ophthalmic suspension, Apply 1 drop to eye(s) as directed by provider 3 (Three) Times a Day., Disp: , Rfl:     meloxicam (MOBIC) 15 MG tablet, Take 1 tablet by mouth Daily., Disp: 90 tablet, Rfl: 0    montelukast (SINGULAIR) 10 MG tablet, Take 1 tablet by mouth Every Night., Disp: 90 tablet, Rfl: 0    naratriptan (AMERGE) 2.5 MG tablet,  "Take 1 tablet by mouth 1 (One) Time As Needed for Migraine. 2.5 mg at onset of headache, may repeat in 4 hours if needed, Disp: , Rfl:     nebivolol (BYSTOLIC) 10 MG tablet, Take 1 tablet by mouth Daily., Disp: 90 tablet, Rfl: 1    Olmesartan-amLODIPine-HCTZ 40-5-25 MG tablet, Take 1 tablet by mouth Daily., Disp: 90 tablet, Rfl: 1    Omega-3 Fatty Acids (fish oil) 1200 MG capsule capsule, , Disp: , Rfl:     omeprazole (priLOSEC) 20 MG capsule, Take 1 capsule by mouth Daily., Disp: , Rfl:     pantoprazole (PROTONIX) 40 MG EC tablet, Take 1 tablet by mouth Daily., Disp: 90 tablet, Rfl: 0    rosuvastatin (Crestor) 5 MG tablet, Take 1 tablet by mouth Daily., Disp: 30 tablet, Rfl: 0    SUMAtriptan (IMITREX) 25 MG tablet, Take 1 tablet by mouth., Disp: , Rfl:     TiZANidine (ZANAFLEX) 2 MG capsule, Take 1 capsule by mouth 3 (Three) Times a Day., Disp: , Rfl:     venlafaxine XR (EFFEXOR-XR) 75 MG 24 hr capsule, Take 3 capsules by mouth Daily., Disp: 270 capsule, Rfl: 1    Allergies:   Allergies   Allergen Reactions    Ace Inhibitors Anaphylaxis and Other (See Comments)     Taken with avelox - throat swelling    Fluticasone Other (See Comments)     \"Increases heart rate, dizziness, felt like something is not right\"    Moxifloxacin Other (See Comments)     When taken with With lisinopril- throat swelling    Pravastatin Myalgia     Stopped per Dr Ernandez due to leg cramps severe         Objective      Vital Signs: There were no vitals filed for this visit.    Ortho Exam:  General: no acute distress, comfortable  Vitals reviewed in chart    Musculoskeletal Exam:    SIDE: Right shoulder  Incisions well healed    Tenderness: None    Range of motion measurements (degrees): 150/140/60/40  Painful arc of motion: no  Smooth arc  Negative lag sign  No evidence of septic joint      Results Review:  XR Shoulder 1 View Right  Imaging: shoulder x-rays 1 view - AP x-ray views    Side: RIGHT SHOULDER    Indication for shoulder x-ray 1 view: " shoulder pain, postop evaluation   following surgery    Comparison: the current xray was compared to preoperative imaging and   indicates expected postoperative changes.    Findings: No acute bony pathology. Well appearing and well positioned   compared to preoperative imaging.  Located and no fracture noted.  Baseline degenerative arthritic findings noted again and noted   intraoperatively.    I personally reviewed the above x-rays.     I have noted results reviewed from previous encounter.          Assessment / Plan      Assessment:   Diagnoses and all orders for this visit:    1. Status post right rotator cuff repair (Primary)  -     Ambulatory Referral to Physical Therapy for Evaluation & Treatment        Quality Metrics:   BMI:   BMI is >= 25 and <30. (Overweight) The following options were offered after discussion;: weight loss educational material (shared in after visit summary)       Tobacco:   Colleen Kimball  reports that she has never smoked. She has been exposed to tobacco smoke. She has never used smokeless tobacco.        Plan:  She has made great progress 2.5 months s/p right arthroscopic rotator cuff repair, biceps tenodesis, subacromial compression, extensive debridement with Dr. Vann.  Encouraged to continue with physical therapy for additional range of motion and strengthening exercises. New PT order provided. She will go slow and gradual in terms of lifting activity. We will see her back in 2-3 months to reassess.    History, diagnosis and treatment plan discussed with Dr. Vann.    Follow Up:   2-3 months    Sonia Quinones PA-C  [facility] [place] [facility]    Dictated using Dragon Speech Recognition.

## 2024-05-10 LAB — BACTERIA SPEC AEROBE CULT: ABNORMAL

## 2024-05-23 DIAGNOSIS — R30.0 DYSURIA: Primary | ICD-10-CM

## 2024-06-05 ENCOUNTER — OFFICE VISIT (OUTPATIENT)
Dept: FAMILY MEDICINE CLINIC | Facility: CLINIC | Age: 74
End: 2024-06-05
Payer: MEDICARE

## 2024-06-05 VITALS
SYSTOLIC BLOOD PRESSURE: 128 MMHG | BODY MASS INDEX: 27.19 KG/M2 | WEIGHT: 144 LBS | OXYGEN SATURATION: 94 % | HEIGHT: 61 IN | DIASTOLIC BLOOD PRESSURE: 64 MMHG | HEART RATE: 76 BPM | RESPIRATION RATE: 18 BRPM

## 2024-06-05 DIAGNOSIS — N30.01 ACUTE CYSTITIS WITH HEMATURIA: ICD-10-CM

## 2024-06-05 DIAGNOSIS — R35.0 URINE FREQUENCY: ICD-10-CM

## 2024-06-05 DIAGNOSIS — N39.0 RECURRENT UTI: ICD-10-CM

## 2024-06-05 DIAGNOSIS — M79.604 RIGHT LEG PAIN: ICD-10-CM

## 2024-06-05 DIAGNOSIS — R30.0 DYSURIA: Primary | ICD-10-CM

## 2024-06-05 DIAGNOSIS — M81.0 AGE-RELATED OSTEOPOROSIS WITHOUT CURRENT PATHOLOGICAL FRACTURE: ICD-10-CM

## 2024-06-05 LAB
BILIRUB BLD-MCNC: NEGATIVE MG/DL
CLARITY, POC: ABNORMAL
COLOR UR: ABNORMAL
EXPIRATION DATE: ABNORMAL
GLUCOSE UR STRIP-MCNC: NEGATIVE MG/DL
KETONES UR QL: NEGATIVE
LEUKOCYTE EST, POC: ABNORMAL
Lab: ABNORMAL
NITRITE UR-MCNC: NEGATIVE MG/ML
PH UR: 6 [PH] (ref 5–8)
PROT UR STRIP-MCNC: ABNORMAL MG/DL
RBC # UR STRIP: ABNORMAL /UL
SP GR UR: 1.03 (ref 1–1.03)
UROBILINOGEN UR QL: NORMAL

## 2024-06-05 PROCEDURE — 1159F MED LIST DOCD IN RCRD: CPT | Performed by: FAMILY MEDICINE

## 2024-06-05 PROCEDURE — 87077 CULTURE AEROBIC IDENTIFY: CPT | Performed by: PHYSICIAN ASSISTANT

## 2024-06-05 PROCEDURE — 81003 URINALYSIS AUTO W/O SCOPE: CPT | Performed by: FAMILY MEDICINE

## 2024-06-05 PROCEDURE — 87186 SC STD MICRODIL/AGAR DIL: CPT | Performed by: PHYSICIAN ASSISTANT

## 2024-06-05 PROCEDURE — 3078F DIAST BP <80 MM HG: CPT | Performed by: FAMILY MEDICINE

## 2024-06-05 PROCEDURE — 1160F RVW MEDS BY RX/DR IN RCRD: CPT | Performed by: FAMILY MEDICINE

## 2024-06-05 PROCEDURE — 87086 URINE CULTURE/COLONY COUNT: CPT | Performed by: PHYSICIAN ASSISTANT

## 2024-06-05 PROCEDURE — 1125F AMNT PAIN NOTED PAIN PRSNT: CPT | Performed by: FAMILY MEDICINE

## 2024-06-05 PROCEDURE — 3074F SYST BP LT 130 MM HG: CPT | Performed by: FAMILY MEDICINE

## 2024-06-05 PROCEDURE — 99214 OFFICE O/P EST MOD 30 MIN: CPT | Performed by: FAMILY MEDICINE

## 2024-06-05 RX ORDER — LACTOBACILLUS RHAMNOSUS GG 10B CELL
CAPSULE ORAL DAILY
COMMUNITY

## 2024-06-05 RX ORDER — NITROFURANTOIN 25; 75 MG/1; MG/1
100 CAPSULE ORAL 2 TIMES DAILY
Qty: 14 CAPSULE | Refills: 0 | Status: SHIPPED | OUTPATIENT
Start: 2024-06-05 | End: 2024-06-12

## 2024-06-05 RX ORDER — NITROFURANTOIN 25; 75 MG/1; MG/1
CAPSULE ORAL
Qty: 8 CAPSULE | Refills: 2 | Status: SHIPPED | OUTPATIENT
Start: 2024-06-05

## 2024-06-05 NOTE — PROGRESS NOTES
Chief Complaint   Patient presents with    Urine Leakage     Pt states burning while urinating or not urinating      Leg Pain     Pt states Mobic helps some but not enough  Pt states that it is right hip pain that progresses down to her ankle and is a throbbing sensation         Colleen Kimball is a 73 y.o. female who presents for Urine Leakage (Pt states burning while urinating or not urinating/) and Leg Pain (Pt states Mobic helps some but not enough/Pt states that it is right hip pain that progresses down to her ankle and is a throbbing sensation)    Patient reports recurrent UTI.  She was recently treated with Keflex for UTI and she was asymptomatic for a few days.  Is here she is intimate with her , she gets another UTI.  She reports burning and urinary urgency.  She is allergic to fluoroquinolones.    She also reports new onset right leg pain for the last month.  She denies any groin pain.  She reports that the pain starts at her right buttocks and extends into her calf.  She describes it as sharp pain.  Denies numbness or burning.  No known trauma or injury.  Has not been to physical therapy.        Past Medical History:   Diagnosis Date    ADHD (attention deficit hyperactivity disorder)     Allergic     Arthritis     Arthritis of back 2000    Arthritis of neck ?    Asthma     Bursitis of hip 2008    Colon polyp     Coronary artery disease     Depression     GERD (gastroesophageal reflux disease)     Headache     Heart murmur     Hip arthrosis     HL (hearing loss)     Hypertension     Irritable bowel syndrome     Low back pain     Low back strain 2010    Neck strain 2001    Osteopenia     Periarthritis of shoulder 2024    Found in MRI of rotator cuff    Rotator cuff syndrome     Tennis elbow 2000    Urinary tract infection     Visual impairment     Wrist sprain 2005    Resulted from falling       Past Surgical History:   Procedure Laterality Date    ADENOIDECTOMY  1956    With tonsilectomy     "APPENDECTOMY  1968    CARDIAC CATHETERIZATION      CATARACT EXTRACTION Bilateral 2012    COLONOSCOPY  6/2:    Colonoscopy and upper GI    HYSTERECTOMY  1996    SHOULDER ARTHROSCOPY Right 02/19/2024    arthroscopic rotator cuff repair, arthroscopic biceps tenodesis, arthroscopic extensive debridement, arthroscopic subacromial decompression with acromioplasty, Dr. Regan Vann    SHOULDER SURGERY  02/24    Right Rotator cuff, bicep trars    SINUS SURGERY      x 2    TONSILLECTOMY AND ADENOIDECTOMY      TUBAL ABDOMINAL LIGATION  1979       Family History   Problem Relation Age of Onset    Diabetes Mother     Arthritis Mother     Hypertension Mother     Osteoarthritis Mother     Hearing loss Mother     Osteoporosis Mother     Heart failure Father     Bipolar disorder Father     Alcohol abuse Father     Depression Father     Hearing loss Father     Hyperlipidemia Father     Anxiety disorder Daughter     Depression Daughter     Anxiety disorder Daughter     Depression Daughter     Developmental Disability Daughter     Learning disabilities Daughter     Hypertension Brother     Thyroid disease Brother     Anesthesia problems Brother     Clotting disorder Brother     Diabetes Brother        Social History     Socioeconomic History    Marital status:    Tobacco Use    Smoking status: Never     Passive exposure: Past    Smokeless tobacco: Never   Vaping Use    Vaping status: Never Used   Substance and Sexual Activity    Alcohol use: Yes     Alcohol/week: 1.0 - 2.0 standard drink of alcohol     Types: 1 - 2 Glasses of wine per week    Drug use: Never    Sexual activity: Yes     Partners: Male     Birth control/protection: Post-menopausal, Tubal ligation, Hysterectomy       Allergies   Allergen Reactions    Ace Inhibitors Anaphylaxis and Other (See Comments)     Taken with avelox - throat swelling    Fluticasone Other (See Comments)     \"Increases heart rate, dizziness, felt like something is not right\"    Moxifloxacin " "Other (See Comments)     When taken with With lisinopril- throat swelling    Pravastatin Myalgia     Stopped per Dr Ernandez due to leg cramps severe       ROS    Review of Systems   Constitutional:  Negative for chills and fever.   Genitourinary:  Positive for dysuria.   Musculoskeletal:  Positive for arthralgias and myalgias. Negative for gait problem.   Neurological:  Negative for dizziness, weakness, numbness and headache.       Vitals:    06/05/24 1526   BP: 128/64   BP Location: Left arm   Patient Position: Sitting   Cuff Size: Adult   Pulse: 76   Resp: 18   SpO2: 94%   Weight: 65.3 kg (144 lb)   Height: 154.9 cm (60.98\")   PainSc:   8   PainLoc: Leg     Body mass index is 27.23 kg/m².    Current Outpatient Medications on File Prior to Visit   Medication Sig Dispense Refill    Alcaftadine 0.25 % solution Apply  to eye(s) as directed by provider.      atomoxetine (STRATTERA) 40 MG capsule Take 1 capsule by mouth Daily. 30 capsule 0    Biotin 1 MG capsule Take 1 tablet by mouth Daily.      buPROPion (WELLBUTRIN) 75 MG tablet Take 1 tablet by mouth Daily. 30 tablet 0    butalbital-acetaminophen-caffeine (FIORICET, ESGIC) -40 MG per tablet Take 1 tablet by mouth Every 4 (Four) Hours As Needed for Headache.      Cholecalciferol 50 MCG (2000 UT) capsule Take  by mouth.      cycloSPORINE (RESTASIS) 0.05 % ophthalmic emulsion Apply 1 drop to eye(s) as directed by provider Every 12 (Twelve) Hours.      dicyclomine (BENTYL) 10 MG/5ML syrup       estradiol (ESTRACE) 0.1 MG/GM vaginal cream Insert 2 g into the vagina 2 (Two) Times a Week. 42.5 g 2    famotidine (PEPCID) 40 MG tablet Take 1 tablet by mouth At Night As Needed for Heartburn. 90 tablet 1    levocetirizine (XYZAL) 5 MG tablet Take 1 tablet by mouth Every Evening. 30 tablet 0    loteprednol (Lotemax) 0.5 % ophthalmic suspension Apply 1 drop to eye(s) as directed by provider 3 (Three) Times a Day.      meloxicam (MOBIC) 15 MG tablet Take 1 tablet by mouth " Daily. 90 tablet 0    montelukast (SINGULAIR) 10 MG tablet Take 1 tablet by mouth Every Night. 90 tablet 0    naratriptan (AMERGE) 2.5 MG tablet Take 1 tablet by mouth 1 (One) Time As Needed for Migraine. 2.5 mg at onset of headache, may repeat in 4 hours if needed      nebivolol (BYSTOLIC) 10 MG tablet Take 1 tablet by mouth Daily. 90 tablet 1    Olmesartan-amLODIPine-HCTZ 40-5-25 MG tablet Take 1 tablet by mouth Daily. 90 tablet 1    Omega-3 Fatty Acids (fish oil) 1200 MG capsule capsule       omeprazole (priLOSEC) 20 MG capsule Take 1 capsule by mouth Daily.      pantoprazole (PROTONIX) 40 MG EC tablet Take 1 tablet by mouth Daily. 90 tablet 0    rosuvastatin (Crestor) 5 MG tablet Take 1 tablet by mouth Daily. 30 tablet 0    SUMAtriptan (IMITREX) 25 MG tablet Take 1 tablet by mouth.      TiZANidine (ZANAFLEX) 2 MG capsule Take 1 capsule by mouth 3 (Three) Times a Day.      venlafaxine XR (EFFEXOR-XR) 75 MG 24 hr capsule Take 3 capsules by mouth Daily. 270 capsule 1    probiotic (CULTURELLE) capsule capsule Take  by mouth Daily.       No current facility-administered medications on file prior to visit.       Results for orders placed or performed in visit on 06/05/24   POCT urinalysis dipstick, automated    Specimen: Urine   Result Value Ref Range    Color Dark Yellow Yellow, Straw, Dark Yellow, Aissatou    Clarity, UA Cloudy (A) Clear    Specific Gravity  1.030 1.005 - 1.030    pH, Urine 6.0 5.0 - 8.0    Leukocytes Moderate (2+) (A) Negative    Nitrite, UA Negative Negative    Protein, POC 1+ (A) Negative mg/dL    Glucose, UA Negative Negative mg/dL    Ketones, UA Negative Negative    Urobilinogen, UA Normal Normal, 0.2 E.U./dL    Bilirubin Negative Negative    Blood, UA Trace (A) Negative    Lot Number [id number]     Expiration Date 1-14-25 PE    Physical Exam  Vitals reviewed.   Constitutional:       General: She is not in acute distress.     Appearance: Normal appearance. She is well-developed and  normal weight. She is not ill-appearing or diaphoretic.   HENT:      Head: Normocephalic and atraumatic.   Eyes:      Extraocular Movements: Extraocular movements intact.      Conjunctiva/sclera: Conjunctivae normal.   Pulmonary:      Effort: No respiratory distress.   Musculoskeletal:         General: Normal range of motion.      Cervical back: Normal range of motion.        Legs:    Neurological:      General: No focal deficit present.      Mental Status: She is alert.   Psychiatric:         Attention and Perception: She is attentive.         Mood and Affect: Mood normal.         Speech: Speech normal.         Behavior: Behavior normal. Behavior is cooperative.         Thought Content: Thought content normal.         Judgment: Judgment normal.          A/P    Diagnoses and all orders for this visit:    1. Dysuria (Primary)  2. Urine frequency  -     POCT urinalysis dipstick, automated    3. Acute cystitis with hematuria  -     Urine Culture - Urine, Urine, Random Void; Future  -     Urine Culture - Urine, Urine, Random Void  -     nitrofurantoin, macrocrystal-monohydrate, (Macrobid) 100 MG capsule; Take 1 capsule by mouth 2 (Two) Times a Day for 7 days.  Dispense: 14 capsule; Refill: 0  Urinalysis shows infection.  Will treat since she is symptomatic.  Just finished course of keflex.  Will send in culture.    4. Recurrent UTI  -     nitrofurantoin, macrocrystal-monohydrate, (Macrobid) 100 MG capsule; Take 1 tablet after intercourse.  Do not exceed 2 tablets weekly.  Dispense: 8 capsule; Refill: 2  Take 1 tablet of nitrofurantoin after intimacy.  Max 2 tablets weekly.  Return in 3 months.    5. Right leg pain  -     Ambulatory Referral to Physical Therapy  -     EMG & Nerve Conduction Test; Future  New.  Started a month ago.  No trauma or injury.  Recommend trying PT.  Will order EMG/NCS.    6. Age-related osteoporosis without current pathological fracture  On prolia.  Has pending labs.  Next scheduled infusion on  07/09.  Ordered by this office.       Plan of care reviewed with patient at the conclusion of today's visit. Education was provided regarding diagnosis, management and any prescribed or recommended OTC medications.  Patient verbalizes understanding of and agreement with management plan.    Dictated Utilizing Dragon Dictation     Please note that portions of this note were completed with a voice recognition program.     Part of this note may be an electronic transcription/translation of spoken language to printed text using the Dragon Dictation System.    Return in about 3 months (around 9/5/2024) for Recheck, recurrent uti.     Marsha Kraft PA-C

## 2024-06-07 LAB — BACTERIA SPEC AEROBE CULT: ABNORMAL

## 2024-06-27 DIAGNOSIS — N39.0 RECURRENT UTI: ICD-10-CM

## 2024-06-28 RX ORDER — NITROFURANTOIN 25; 75 MG/1; MG/1
CAPSULE ORAL
Qty: 8 CAPSULE | Refills: 2 | OUTPATIENT
Start: 2024-06-28

## 2024-06-28 NOTE — TELEPHONE ENCOUNTER
Rx Refill Note  Requested Prescriptions     Refused Prescriptions Disp Refills    nitrofurantoin, macrocrystal-monohydrate, (Macrobid) 100 MG capsule 8 capsule 2     Sig: Take 1 tablet after intercourse.  Do not exceed 2 tablets weekly.      Last office visit with prescribing clinician: 6/5/2024   Last telemedicine visit with prescribing clinician: Visit date not found   Next office visit with prescribing clinician: 9/17/2024                         Would you like a call back once the refill request has been completed: [] Yes [] No    If the office needs to give you a call back, can they leave a voicemail: [] Yes [] No    Adalgisa Romeo MA  06/28/24, 08:50 EDT

## 2024-07-09 ENCOUNTER — LAB (OUTPATIENT)
Dept: ONCOLOGY | Facility: HOSPITAL | Age: 74
End: 2024-07-09
Payer: MEDICARE

## 2024-07-09 VITALS
DIASTOLIC BLOOD PRESSURE: 53 MMHG | HEART RATE: 64 BPM | RESPIRATION RATE: 17 BRPM | BODY MASS INDEX: 26.96 KG/M2 | SYSTOLIC BLOOD PRESSURE: 123 MMHG | WEIGHT: 142.6 LBS | TEMPERATURE: 97 F

## 2024-07-09 DIAGNOSIS — M81.0 AGE-RELATED OSTEOPOROSIS WITHOUT CURRENT PATHOLOGICAL FRACTURE: ICD-10-CM

## 2024-07-09 PROCEDURE — 36415 COLL VENOUS BLD VENIPUNCTURE: CPT

## 2024-07-10 ENCOUNTER — APPOINTMENT (OUTPATIENT)
Dept: CT IMAGING | Facility: HOSPITAL | Age: 74
End: 2024-07-10
Payer: MEDICARE

## 2024-07-10 ENCOUNTER — HOSPITAL ENCOUNTER (EMERGENCY)
Facility: HOSPITAL | Age: 74
Discharge: HOME | End: 2024-07-10
Attending: EMERGENCY MEDICINE
Payer: MEDICARE

## 2024-07-10 ENCOUNTER — TELEPHONE (OUTPATIENT)
Dept: FAMILY MEDICINE CLINIC | Facility: CLINIC | Age: 74
End: 2024-07-10

## 2024-07-10 ENCOUNTER — APPOINTMENT (OUTPATIENT)
Dept: GENERAL RADIOLOGY | Facility: HOSPITAL | Age: 74
End: 2024-07-10
Payer: MEDICARE

## 2024-07-10 VITALS
BODY MASS INDEX: 28.13 KG/M2 | RESPIRATION RATE: 16 BRPM | TEMPERATURE: 98.1 F | WEIGHT: 143.3 LBS | HEIGHT: 60 IN | SYSTOLIC BLOOD PRESSURE: 131 MMHG | HEART RATE: 63 BPM | DIASTOLIC BLOOD PRESSURE: 69 MMHG | OXYGEN SATURATION: 97 %

## 2024-07-10 DIAGNOSIS — N28.9 ACUTE RENAL INSUFFICIENCY: ICD-10-CM

## 2024-07-10 DIAGNOSIS — N39.0 ACUTE URINARY TRACT INFECTION: Primary | ICD-10-CM

## 2024-07-10 LAB
25(OH)D3+25(OH)D2 SERPL-MCNC: 39.4 NG/ML (ref 30–100)
ALBUMIN SERPL-MCNC: 2.9 G/DL (ref 3.5–5.2)
ALBUMIN SERPL-MCNC: 3.1 G/DL (ref 3.5–5.2)
ALBUMIN/GLOB SERPL: 0.9 G/DL
ALBUMIN/GLOB SERPL: 1.3 G/DL
ALP SERPL-CCNC: 227 U/L (ref 39–117)
ALP SERPL-CCNC: 236 U/L (ref 39–117)
ALT SERPL W P-5'-P-CCNC: 32 U/L (ref 1–33)
ALT SERPL-CCNC: 34 U/L (ref 1–33)
ANION GAP SERPL CALCULATED.3IONS-SCNC: 11 MMOL/L (ref 5–15)
AST SERPL-CCNC: 28 U/L (ref 1–32)
AST SERPL-CCNC: 33 U/L (ref 1–32)
B PARAPERT DNA SPEC QL NAA+PROBE: NOT DETECTED
B PERT DNA SPEC QL NAA+PROBE: NOT DETECTED
BACTERIA UR QL AUTO: ABNORMAL /HPF
BASOPHILS # BLD MANUAL: 0 10*3/MM3 (ref 0–0.2)
BASOPHILS NFR BLD MANUAL: 0 % (ref 0–1.5)
BILIRUB SERPL-MCNC: 0.4 MG/DL (ref 0–1.2)
BILIRUB SERPL-MCNC: 0.4 MG/DL (ref 0–1.2)
BILIRUB UR QL STRIP: NEGATIVE
BUN SERPL-MCNC: 33 MG/DL (ref 8–23)
BUN SERPL-MCNC: 35 MG/DL (ref 8–23)
BUN/CREAT SERPL: 20.1 (ref 7–25)
BUN/CREAT SERPL: 22.9 (ref 7–25)
C PNEUM DNA NPH QL NAA+NON-PROBE: NOT DETECTED
CALCIUM SERPL-MCNC: 9.4 MG/DL (ref 8.6–10.5)
CALCIUM SPEC-SCNC: 9.9 MG/DL (ref 8.6–10.5)
CHLORIDE SERPL-SCNC: 92 MMOL/L (ref 98–107)
CHLORIDE SERPL-SCNC: 92 MMOL/L (ref 98–107)
CLARITY UR: ABNORMAL
CLUMPED PLATELETS: PRESENT
CO2 SERPL-SCNC: 20.5 MMOL/L (ref 22–29)
CO2 SERPL-SCNC: 23 MMOL/L (ref 22–29)
COLOR UR: YELLOW
CREAT SERPL-MCNC: 1.44 MG/DL (ref 0.57–1)
CREAT SERPL-MCNC: 1.74 MG/DL (ref 0.57–1)
D-LACTATE SERPL-SCNC: 1.5 MMOL/L (ref 0.5–2)
DEPRECATED RDW RBC AUTO: 42.8 FL (ref 37–54)
EGFRCR SERPLBLD CKD-EPI 2021: 30.5 ML/MIN/1.73
EGFRCR SERPLBLD CKD-EPI 2021: 38.2 ML/MIN/1.73
EOSINOPHIL # BLD MANUAL: 0 10*3/MM3 (ref 0–0.4)
EOSINOPHIL NFR BLD MANUAL: 0 % (ref 0.3–6.2)
ERYTHROCYTE [DISTWIDTH] IN BLOOD BY AUTOMATED COUNT: 12.9 % (ref 12.3–15.4)
FLUAV SUBTYP SPEC NAA+PROBE: NOT DETECTED
FLUBV RNA ISLT QL NAA+PROBE: NOT DETECTED
GLOBULIN SER CALC-MCNC: 2.3 GM/DL
GLOBULIN UR ELPH-MCNC: 3.5 GM/DL
GLUCOSE SERPL-MCNC: 79 MG/DL (ref 65–99)
GLUCOSE SERPL-MCNC: 98 MG/DL (ref 65–99)
GLUCOSE UR STRIP-MCNC: NEGATIVE MG/DL
HADV DNA SPEC NAA+PROBE: NOT DETECTED
HCOV 229E RNA SPEC QL NAA+PROBE: NOT DETECTED
HCOV HKU1 RNA SPEC QL NAA+PROBE: NOT DETECTED
HCOV NL63 RNA SPEC QL NAA+PROBE: NOT DETECTED
HCOV OC43 RNA SPEC QL NAA+PROBE: NOT DETECTED
HCT VFR BLD AUTO: 35.4 % (ref 34–46.6)
HGB BLD-MCNC: 12.2 G/DL (ref 12–15.9)
HGB UR QL STRIP.AUTO: NEGATIVE
HMPV RNA NPH QL NAA+NON-PROBE: NOT DETECTED
HPIV1 RNA ISLT QL NAA+PROBE: NOT DETECTED
HPIV2 RNA SPEC QL NAA+PROBE: NOT DETECTED
HPIV3 RNA NPH QL NAA+PROBE: NOT DETECTED
HPIV4 P GENE NPH QL NAA+PROBE: NOT DETECTED
HYALINE CASTS UR QL AUTO: ABNORMAL /LPF
KETONES UR QL STRIP: NEGATIVE
LARGE PLATELETS: ABNORMAL
LEUKOCYTE ESTERASE UR QL STRIP.AUTO: ABNORMAL
LIPASE SERPL-CCNC: 137 U/L (ref 13–60)
LYMPHOCYTES # BLD MANUAL: 2.28 10*3/MM3 (ref 0.7–3.1)
LYMPHOCYTES NFR BLD MANUAL: 7 % (ref 5–12)
M PNEUMO IGG SER IA-ACNC: NOT DETECTED
MAGNESIUM SERPL-MCNC: 1.6 MG/DL (ref 1.6–2.4)
MAGNESIUM SERPL-MCNC: 1.7 MG/DL (ref 1.6–2.4)
MCH RBC QN AUTO: 30.9 PG (ref 26.6–33)
MCHC RBC AUTO-ENTMCNC: 34.5 G/DL (ref 31.5–35.7)
MCV RBC AUTO: 89.6 FL (ref 79–97)
METAMYELOCYTES NFR BLD MANUAL: 2 % (ref 0–0)
MONOCYTES # BLD: 1.14 10*3/MM3 (ref 0.1–0.9)
MYELOCYTES NFR BLD MANUAL: 3 % (ref 0–0)
NEUTROPHILS # BLD AUTO: 12.03 10*3/MM3 (ref 1.7–7)
NEUTROPHILS NFR BLD MANUAL: 66 % (ref 42.7–76)
NEUTS BAND NFR BLD MANUAL: 8 % (ref 0–5)
NITRITE UR QL STRIP: NEGATIVE
PH UR STRIP.AUTO: 5.5 [PH] (ref 5–8)
PHOSPHATE SERPL-MCNC: 3.6 MG/DL (ref 2.5–4.5)
PLATELET # BLD AUTO: 389 10*3/MM3 (ref 140–450)
PMV BLD AUTO: 10.2 FL (ref 6–12)
POTASSIUM SERPL-SCNC: 3.6 MMOL/L (ref 3.5–5.2)
POTASSIUM SERPL-SCNC: 4.1 MMOL/L (ref 3.5–5.2)
PROT SERPL-MCNC: 5.2 G/DL (ref 6–8.5)
PROT SERPL-MCNC: 6.6 G/DL (ref 6–8.5)
PROT UR QL STRIP: NEGATIVE
RBC # BLD AUTO: 3.95 10*6/MM3 (ref 3.77–5.28)
RBC # UR STRIP: ABNORMAL /HPF
RBC MORPH BLD: NORMAL
REF LAB TEST METHOD: ABNORMAL
RHINOVIRUS RNA SPEC NAA+PROBE: NOT DETECTED
RSV RNA NPH QL NAA+NON-PROBE: NOT DETECTED
SARS-COV-2 RNA NPH QL NAA+NON-PROBE: NOT DETECTED
SMALL PLATELETS BLD QL SMEAR: ADEQUATE
SODIUM SERPL-SCNC: 126 MMOL/L (ref 136–145)
SODIUM SERPL-SCNC: 126 MMOL/L (ref 136–145)
SP GR UR STRIP: 1.01 (ref 1–1.03)
SQUAMOUS #/AREA URNS HPF: ABNORMAL /HPF
TSH SERPL DL<=0.05 MIU/L-ACNC: 1.6 UIU/ML (ref 0.27–4.2)
UROBILINOGEN UR QL STRIP: ABNORMAL
VARIANT LYMPHS NFR BLD MANUAL: 12 % (ref 19.6–45.3)
VARIANT LYMPHS NFR BLD MANUAL: 2 % (ref 0–5)
WBC # UR STRIP: ABNORMAL /HPF
WBC MORPH BLD: NORMAL
WBC NRBC COR # BLD AUTO: 16.25 10*3/MM3 (ref 3.4–10.8)

## 2024-07-10 PROCEDURE — 25810000003 SODIUM CHLORIDE 0.9 % SOLUTION: Performed by: EMERGENCY MEDICINE

## 2024-07-10 PROCEDURE — 87088 URINE BACTERIA CULTURE: CPT | Performed by: EMERGENCY MEDICINE

## 2024-07-10 PROCEDURE — 83690 ASSAY OF LIPASE: CPT | Performed by: EMERGENCY MEDICINE

## 2024-07-10 PROCEDURE — 87086 URINE CULTURE/COLONY COUNT: CPT | Performed by: EMERGENCY MEDICINE

## 2024-07-10 PROCEDURE — 80053 COMPREHEN METABOLIC PANEL: CPT | Performed by: EMERGENCY MEDICINE

## 2024-07-10 PROCEDURE — 84443 ASSAY THYROID STIM HORMONE: CPT | Performed by: EMERGENCY MEDICINE

## 2024-07-10 PROCEDURE — 85007 BL SMEAR W/DIFF WBC COUNT: CPT | Performed by: EMERGENCY MEDICINE

## 2024-07-10 PROCEDURE — 87186 SC STD MICRODIL/AGAR DIL: CPT | Performed by: EMERGENCY MEDICINE

## 2024-07-10 PROCEDURE — 85025 COMPLETE CBC W/AUTO DIFF WBC: CPT | Performed by: EMERGENCY MEDICINE

## 2024-07-10 PROCEDURE — 83735 ASSAY OF MAGNESIUM: CPT | Performed by: EMERGENCY MEDICINE

## 2024-07-10 PROCEDURE — 0202U NFCT DS 22 TRGT SARS-COV-2: CPT | Performed by: EMERGENCY MEDICINE

## 2024-07-10 PROCEDURE — 81001 URINALYSIS AUTO W/SCOPE: CPT | Performed by: EMERGENCY MEDICINE

## 2024-07-10 PROCEDURE — 99284 EMERGENCY DEPT VISIT MOD MDM: CPT

## 2024-07-10 PROCEDURE — 36415 COLL VENOUS BLD VENIPUNCTURE: CPT

## 2024-07-10 PROCEDURE — 74176 CT ABD & PELVIS W/O CONTRAST: CPT

## 2024-07-10 PROCEDURE — 83605 ASSAY OF LACTIC ACID: CPT | Performed by: EMERGENCY MEDICINE

## 2024-07-10 PROCEDURE — 71045 X-RAY EXAM CHEST 1 VIEW: CPT

## 2024-07-10 RX ORDER — CEFUROXIME AXETIL 250 MG/1
250 TABLET ORAL 2 TIMES DAILY
Qty: 14 TABLET | Refills: 0 | Status: SHIPPED | OUTPATIENT
Start: 2024-07-10 | End: 2024-07-10

## 2024-07-10 RX ORDER — CEFUROXIME AXETIL 250 MG/1
250 TABLET ORAL 2 TIMES DAILY
Qty: 14 TABLET | Refills: 0 | Status: SHIPPED | OUTPATIENT
Start: 2024-07-10 | End: 2024-07-17

## 2024-07-10 RX ORDER — CEFUROXIME AXETIL 250 MG/1
250 TABLET ORAL ONCE
Status: COMPLETED | OUTPATIENT
Start: 2024-07-10 | End: 2024-07-10

## 2024-07-10 RX ORDER — SODIUM CHLORIDE 0.9 % (FLUSH) 0.9 %
10 SYRINGE (ML) INJECTION AS NEEDED
Status: DISCONTINUED | OUTPATIENT
Start: 2024-07-10 | End: 2024-07-10 | Stop reason: HOSPADM

## 2024-07-10 RX ADMIN — CEFUROXIME AXETIL 250 MG: 250 TABLET ORAL at 19:32

## 2024-07-10 RX ADMIN — SODIUM CHLORIDE 1000 ML: 9 INJECTION, SOLUTION INTRAVENOUS at 15:19

## 2024-07-10 NOTE — TELEPHONE ENCOUNTER
Spoke to patient regarding labs.  She admits to feeling unwell with some disorientation.  Advised her to go to ER for further evaluation and work-up.  Will cancel appointment with office today.   will drive her to ER.

## 2024-07-10 NOTE — TELEPHONE ENCOUNTER
Caller: CARLO ALBARRAN    Relationship: Emergency Contact    Best call back number: [phone]     What is the best time to reach you: ANYTIME    Who are you requesting to speak with (clinical staff, provider,  specific staff member): PCP OR MA    Do you know the name of the person who called:     What was the call regarding: SPOUSE CALLED IN TO ADVISE THAT PATIENT HAD ARRIVED AT ER    Is it okay if the provider responds through MyChart: PREFERS CALL

## 2024-07-10 NOTE — ED PROVIDER NOTES
Subjective   History of Present Illness  73-year-old female who presents for evaluation of abnormal labs.The patient reports that 5 days ago she discontinued antidepressant medications because she received genetic testing and it was reported that these were not good medications for her.  Since that given time she has had increased frequency of urination that is most prominent throughout the night.  She states that she will get up almost every hour to urinate.  She does not feel like she fully empties her bladder.  She does not notice as frequent of urination throughout the day.  She also reports that she sleeps a large portion of the day but this has not changed just within the last 5 days.  She has a history of urinary tract infections that have been more frequently diagnosed within the last 8 months since getting newly .  She, however, does report a lifelong history of urinary tract infections even prior to this 8-month period of time.  She had labs performed by her primary care physician and creatinine was elevated to 1.74 from a previous baseline of 0.96 and she was advised to present to the ER.  She feels like her oral fluid intake is actually being good.  She denies nausea or vomiting.  She denies fever or other infectious symptoms.  No other acute complaints.      Review of Systems   Constitutional:  Negative for chills, fatigue and fever.   HENT:  Negative for congestion, ear pain, postnasal drip, sinus pressure and sore throat.    Eyes:  Negative for pain, redness and visual disturbance.   Respiratory:  Negative for cough, chest tightness and shortness of breath.    Cardiovascular:  Negative for chest pain, palpitations and leg swelling.   Gastrointestinal:  Negative for abdominal pain, anal bleeding, blood in stool, diarrhea, nausea and vomiting.   Endocrine: Negative for polydipsia and polyuria.   Genitourinary:  Positive for frequency. Negative for difficulty urinating, dysuria and urgency.  "  Musculoskeletal:  Negative for arthralgias, back pain and neck pain.   Skin:  Negative for pallor and rash.   Allergic/Immunologic: Negative for environmental allergies and immunocompromised state.   Neurological:  Negative for dizziness, weakness and headaches.   Hematological:  Negative for adenopathy.   Psychiatric/Behavioral:  Negative for confusion, self-injury and suicidal ideas. The patient is not nervous/anxious.    All other systems reviewed and are negative.      Past Medical History:   Diagnosis Date    ADHD (attention deficit hyperactivity disorder)     Allergic     Arthritis     Arthritis of back 2000    Arthritis of neck ?    Asthma     Bursitis of hip 2008    Colon polyp     Coronary artery disease     Depression     GERD (gastroesophageal reflux disease)     Headache     Heart murmur     Hip arthrosis     HL (hearing loss)     Hypertension     Irritable bowel syndrome     Low back pain     Low back strain 2010    Neck strain 2001    Osteopenia     Periarthritis of shoulder 2024    Found in MRI of rotator cuff    Rotator cuff syndrome     Tennis elbow 2000    Urinary tract infection     Visual impairment     Wrist sprain 2005    Resulted from falling       Allergies   Allergen Reactions    Ace Inhibitors Anaphylaxis and Other (See Comments)     Taken with avelox - throat swelling    Fluticasone Other (See Comments)     \"Increases heart rate, dizziness, felt like something is not right\"    Moxifloxacin Other (See Comments)     When taken with With lisinopril- throat swelling    Pravastatin Myalgia     Stopped per Dr Ernandez due to leg cramps severe       Past Surgical History:   Procedure Laterality Date    ADENOIDECTOMY  1956    With tonsilectomy    APPENDECTOMY  1968    CARDIAC CATHETERIZATION      CATARACT EXTRACTION Bilateral 2012    COLONOSCOPY  6/2:    Colonoscopy and upper GI    HYSTERECTOMY  1996    SHOULDER ARTHROSCOPY Right 02/19/2024    arthroscopic rotator cuff repair, arthroscopic biceps " tenodesis, arthroscopic extensive debridement, arthroscopic subacromial decompression with acromioplasty, Dr. Regan Vann    SHOULDER SURGERY  02/24    Right Rotator cuff, bicep trars    SINUS SURGERY      x 2    TONSILLECTOMY AND ADENOIDECTOMY      TUBAL ABDOMINAL LIGATION  1979       Family History   Problem Relation Age of Onset    Diabetes Mother     Arthritis Mother     Hypertension Mother     Osteoarthritis Mother     Hearing loss Mother     Osteoporosis Mother     Heart failure Father     Bipolar disorder Father     Alcohol abuse Father     Depression Father     Hearing loss Father     Hyperlipidemia Father     Anxiety disorder Daughter     Depression Daughter     Anxiety disorder Daughter     Depression Daughter     Developmental Disability Daughter     Learning disabilities Daughter     Hypertension Brother     Thyroid disease Brother     Anesthesia problems Brother     Clotting disorder Brother     Diabetes Brother        Social History     Socioeconomic History    Marital status:    Tobacco Use    Smoking status: Never     Passive exposure: Past    Smokeless tobacco: Never   Vaping Use    Vaping status: Never Used   Substance and Sexual Activity    Alcohol use: Yes     Alcohol/week: 1.0 - 2.0 standard drink of alcohol     Types: 1 - 2 Glasses of wine per week    Drug use: Never    Sexual activity: Yes     Partners: Male     Birth control/protection: Post-menopausal, Tubal ligation, Hysterectomy           Objective   Physical Exam  Vitals and nursing note reviewed.   Constitutional:       General: She is not in acute distress.     Appearance: Normal appearance. She is well-developed. She is not toxic-appearing or diaphoretic.   HENT:      Head: Normocephalic and atraumatic.      Right Ear: External ear normal.      Left Ear: External ear normal.      Nose: Nose normal.   Eyes:      General: Lids are normal.      Pupils: Pupils are equal, round, and reactive to light.   Neck:      Trachea: No  tracheal deviation.   Cardiovascular:      Rate and Rhythm: Normal rate and regular rhythm.      Pulses: No decreased pulses.      Heart sounds: Normal heart sounds. No murmur heard.     No friction rub. No gallop.   Pulmonary:      Effort: Pulmonary effort is normal. No respiratory distress.      Breath sounds: Normal breath sounds. No decreased breath sounds, wheezing, rhonchi or rales.   Abdominal:      General: Bowel sounds are normal.      Palpations: Abdomen is soft.      Tenderness: There is no abdominal tenderness. There is no guarding or rebound.   Musculoskeletal:         General: No deformity. Normal range of motion.      Cervical back: Normal range of motion and neck supple.   Lymphadenopathy:      Cervical: No cervical adenopathy.   Skin:     General: Skin is warm and dry.      Findings: No rash.   Neurological:      Mental Status: She is alert and oriented to person, place, and time.      Cranial Nerves: No cranial nerve deficit.      Sensory: No sensory deficit.   Psychiatric:         Speech: Speech normal.         Behavior: Behavior normal.         Thought Content: Thought content normal.         Judgment: Judgment normal.         Procedures           ED Course                                             Medical Decision Making  Differential diagnosis includes urinary tract infection, dehydration, adverse medication effect, medication withdrawal, other unspecified etiology.    Labs do show leukocytosis to 16,000.  Normal H&H.  Slightly elevated creatinine to 1.44 with low sodium of 126.  The sodium is consistent with baseline.  Creatinine is slightly more elevated than previous baseline and may indicate mild dehydration.    Viral respiratory panel is negative.  Urine is concerning for infection with small leuk esterase 4+ bacteria and too numerous to count white cells.  The patient was given IV fluids and Ceftin here in the ER for treatment of dehydration and urinary tract infection.  Lipase was  elevated to 137 but this is actually improved relative to previous baseline.  The patient has a normal magnesium level, lactic acid level, and TSH.    Chest x-ray shows no acute disease.  CT scan abdomen pelvis shows no acute disease.    I discussed admission versus outpatient management.  The patient prefers to go home and feels like she can hydrate well.  She will be advised to drink plenty of fluids and will be given antibiotic for treatment of urinary tract infection.  She is advised to follow-up with urology for further outpatient evaluation.    She can discuss her recent discontinuation of antidepressant medications with her primary care physician but I have not recommended any medication adjustments besides the antibiotics at this given time.    Problems Addressed:  Acute renal insufficiency: complicated acute illness or injury with systemic symptoms  Acute urinary tract infection: complicated acute illness or injury with systemic symptoms    Amount and/or Complexity of Data Reviewed  Independent Historian: spouse     Details:  provides additional information  External Data Reviewed: labs and radiology.  Labs: ordered. Decision-making details documented in ED Course.  Radiology: ordered and independent interpretation performed. Decision-making details documented in ED Course.    Risk  Prescription drug management.        Final diagnoses:   Acute urinary tract infection   Acute renal insufficiency       ED Disposition  ED Disposition       ED Disposition   Discharge    Condition   Stable    Comment   --               Marsha Kraft PA-C  [address] [id number] [phone]    In 1 week           Medication List        New Prescriptions      cefuroxime 250 MG tablet  Commonly known as: CEFTIN  Take 1 tablet by mouth 2 (Two) Times a Day for 7 days.               Where to Get Your Medications        These medications were sent to [facility] [id number] [place] [address]  [address] [facility] [address] - [phone] PH - [phone] FX  [address] [facility], [facility] [address]      Phone: [phone]   cefuroxime 250 MG tablet            Liz Estrada MD  07/11/24 0958

## 2024-07-10 NOTE — DISCHARGE INSTRUCTIONS
Make sure to drink plenty of fluids.    Take antibiotics as prescribed.    Follow-up with primary care physician for recheck of labs in 1 week.    Return to the ER with any further concern.

## 2024-07-10 NOTE — Clinical Note
[facility] EMERGENCY DEPARTMENT  [address]  [facility] [address]  Phone: [phone]    Colleen Morgan was seen and treated in our emergency department on 7/10/2024.  She may return to work on 07/12/2024.         Thank you for choosing [facility].    Liz Estrada MD

## 2024-07-12 LAB — BACTERIA SPEC AEROBE CULT: ABNORMAL

## 2024-07-15 ENCOUNTER — TELEPHONE (OUTPATIENT)
Dept: FAMILY MEDICINE CLINIC | Facility: CLINIC | Age: 74
End: 2024-07-15
Payer: MEDICARE

## 2024-07-15 DIAGNOSIS — N17.9 AKI (ACUTE KIDNEY INJURY): ICD-10-CM

## 2024-07-15 DIAGNOSIS — M81.0 AGE-RELATED OSTEOPOROSIS WITHOUT CURRENT PATHOLOGICAL FRACTURE: Primary | ICD-10-CM

## 2024-07-15 NOTE — TELEPHONE ENCOUNTER
No answer, left message.  Need to repeat kidney function prior to proceeding with Prolia.  Would like patient to obtain this lab work on Friday if possible.

## 2024-07-22 ENCOUNTER — LAB (OUTPATIENT)
Dept: LAB | Facility: HOSPITAL | Age: 74
End: 2024-07-22
Payer: MEDICARE

## 2024-07-22 ENCOUNTER — OFFICE VISIT (OUTPATIENT)
Dept: FAMILY MEDICINE CLINIC | Facility: CLINIC | Age: 74
End: 2024-07-22
Payer: MEDICARE

## 2024-07-22 VITALS
DIASTOLIC BLOOD PRESSURE: 74 MMHG | SYSTOLIC BLOOD PRESSURE: 118 MMHG | HEIGHT: 60 IN | WEIGHT: 139.8 LBS | BODY MASS INDEX: 27.45 KG/M2 | OXYGEN SATURATION: 97 % | HEART RATE: 66 BPM

## 2024-07-22 DIAGNOSIS — M81.0 AGE-RELATED OSTEOPOROSIS WITHOUT CURRENT PATHOLOGICAL FRACTURE: ICD-10-CM

## 2024-07-22 DIAGNOSIS — R30.0 DYSURIA: ICD-10-CM

## 2024-07-22 DIAGNOSIS — F33.1 MODERATE EPISODE OF RECURRENT MAJOR DEPRESSIVE DISORDER: ICD-10-CM

## 2024-07-22 DIAGNOSIS — R19.7 DIARRHEA, UNSPECIFIED TYPE: ICD-10-CM

## 2024-07-22 DIAGNOSIS — F98.8 ATTENTION DEFICIT DISORDER, UNSPECIFIED HYPERACTIVITY PRESENCE: Chronic | ICD-10-CM

## 2024-07-22 DIAGNOSIS — N17.9 AKI (ACUTE KIDNEY INJURY): ICD-10-CM

## 2024-07-22 DIAGNOSIS — R30.0 DYSURIA: Primary | ICD-10-CM

## 2024-07-22 DIAGNOSIS — F41.9 ANXIETY: ICD-10-CM

## 2024-07-22 DIAGNOSIS — N39.0 RECURRENT UTI: ICD-10-CM

## 2024-07-22 DIAGNOSIS — I10 PRIMARY HYPERTENSION: ICD-10-CM

## 2024-07-22 LAB
ALBUMIN SERPL-MCNC: 4 G/DL (ref 3.5–5.2)
ALBUMIN/GLOB SERPL: 1.3 G/DL
ALP SERPL-CCNC: 102 U/L (ref 39–117)
ALT SERPL W P-5'-P-CCNC: 33 U/L (ref 1–33)
ANION GAP SERPL CALCULATED.3IONS-SCNC: 12 MMOL/L (ref 5–15)
AST SERPL-CCNC: 28 U/L (ref 1–32)
BILIRUB BLD-MCNC: NEGATIVE MG/DL
BILIRUB SERPL-MCNC: 0.3 MG/DL (ref 0–1.2)
BUN SERPL-MCNC: 25 MG/DL (ref 8–23)
BUN/CREAT SERPL: 18.9 (ref 7–25)
CALCIUM SPEC-SCNC: 11.7 MG/DL (ref 8.6–10.5)
CHLORIDE SERPL-SCNC: 98 MMOL/L (ref 98–107)
CLARITY, POC: CLEAR
CO2 SERPL-SCNC: 25 MMOL/L (ref 22–29)
COLOR UR: YELLOW
CREAT SERPL-MCNC: 1.32 MG/DL (ref 0.57–1)
EGFRCR SERPLBLD CKD-EPI 2021: 42.5 ML/MIN/1.73
EXPIRATION DATE: ABNORMAL
GLOBULIN UR ELPH-MCNC: 3.1 GM/DL
GLUCOSE SERPL-MCNC: 96 MG/DL (ref 65–99)
GLUCOSE UR STRIP-MCNC: NEGATIVE MG/DL
KETONES UR QL: NEGATIVE
LEUKOCYTE EST, POC: ABNORMAL
Lab: ABNORMAL
MAGNESIUM SERPL-MCNC: 1.5 MG/DL (ref 1.6–2.4)
NITRITE UR-MCNC: NEGATIVE MG/ML
PH UR: 6 [PH] (ref 5–8)
PHOSPHATE SERPL-MCNC: 3.5 MG/DL (ref 2.5–4.5)
POTASSIUM SERPL-SCNC: 5.1 MMOL/L (ref 3.5–5.2)
PROT SERPL-MCNC: 7.1 G/DL (ref 6–8.5)
PROT UR STRIP-MCNC: NEGATIVE MG/DL
RBC # UR STRIP: NEGATIVE /UL
SODIUM SERPL-SCNC: 135 MMOL/L (ref 136–145)
SP GR UR: 1.01 (ref 1–1.03)
UROBILINOGEN UR QL: NORMAL

## 2024-07-22 PROCEDURE — 82306 VITAMIN D 25 HYDROXY: CPT

## 2024-07-22 PROCEDURE — 3078F DIAST BP <80 MM HG: CPT | Performed by: PHYSICIAN ASSISTANT

## 2024-07-22 PROCEDURE — 3074F SYST BP LT 130 MM HG: CPT | Performed by: PHYSICIAN ASSISTANT

## 2024-07-22 PROCEDURE — 36415 COLL VENOUS BLD VENIPUNCTURE: CPT

## 2024-07-22 PROCEDURE — 83735 ASSAY OF MAGNESIUM: CPT

## 2024-07-22 PROCEDURE — 84100 ASSAY OF PHOSPHORUS: CPT

## 2024-07-22 PROCEDURE — 99214 OFFICE O/P EST MOD 30 MIN: CPT | Performed by: PHYSICIAN ASSISTANT

## 2024-07-22 PROCEDURE — 87088 URINE BACTERIA CULTURE: CPT | Performed by: PHYSICIAN ASSISTANT

## 2024-07-22 PROCEDURE — 87086 URINE CULTURE/COLONY COUNT: CPT | Performed by: PHYSICIAN ASSISTANT

## 2024-07-22 PROCEDURE — 80053 COMPREHEN METABOLIC PANEL: CPT

## 2024-07-22 PROCEDURE — 81003 URINALYSIS AUTO W/O SCOPE: CPT | Performed by: PHYSICIAN ASSISTANT

## 2024-07-22 PROCEDURE — 1160F RVW MEDS BY RX/DR IN RCRD: CPT | Performed by: PHYSICIAN ASSISTANT

## 2024-07-22 PROCEDURE — 1126F AMNT PAIN NOTED NONE PRSNT: CPT | Performed by: PHYSICIAN ASSISTANT

## 2024-07-22 PROCEDURE — 1159F MED LIST DOCD IN RCRD: CPT | Performed by: PHYSICIAN ASSISTANT

## 2024-07-22 PROCEDURE — 87186 SC STD MICRODIL/AGAR DIL: CPT | Performed by: PHYSICIAN ASSISTANT

## 2024-07-22 RX ORDER — FLUOXETINE HYDROCHLORIDE 20 MG/1
20 CAPSULE ORAL DAILY
Qty: 90 CAPSULE | Refills: 1 | Status: SHIPPED | OUTPATIENT
Start: 2024-07-22

## 2024-07-22 RX ORDER — ATOMOXETINE 40 MG/1
40 CAPSULE ORAL DAILY
Qty: 90 CAPSULE | Refills: 1 | Status: SHIPPED | OUTPATIENT
Start: 2024-07-22

## 2024-07-22 NOTE — PROGRESS NOTES
Chief Complaint   Patient presents with    Urinary Tract Infection     ED Visit 7/10/24       Colleen Morgan is a pleasant 73 y.o. female who is here for routine follow-up of UTI.  Patient was seen at [facility] on 07/10 with abnormal kidney function, feeling unwell and diagnosed with UTI.  She went on vacation shortly after ED visit and did not take prescription for UTI.  She took nitrofurantoin instead and took this twice daily for 7 days.  She has been off of the medication for the last 3 days.  Not having sex.  She reports multiple episodes of diarrhea in the last few days since being on antibiotics.    She also had genetic testing completed on her tolerance to psychiatric medications through her work/group home insurance.  She found that she was on several medications that did not appear to be well-tolerated so she discontinued them abruptly after being on them for years.  She was on Wellbutrin 75 mg daily and Strattera 40 mg daily and ran out of these 3 weeks ago.  She stopped her Effexor 3 weeks ago.  Was on Effexor 150 mg twice daily and did not taper off.    She is feeling unwell today.  She has joint pain throughout her body.    Due for Prolia infusion tomorrow.    Her  is present at appointment today.    Past Medical History:   Diagnosis Date    ADHD (attention deficit hyperactivity disorder)     Allergic     Arthritis     Arthritis of back 2000    Arthritis of neck ?    Asthma     Bursitis of hip 2008    Colon polyp     Coronary artery disease     Depression     GERD (gastroesophageal reflux disease)     Headache     Heart murmur     Hip arthrosis     HL (hearing loss)     Hypertension     Irritable bowel syndrome     Low back pain     Low back strain 2010    Neck strain 2001    Osteopenia     Periarthritis of shoulder 2024    Found in MRI of rotator cuff    Rotator cuff syndrome     Tennis elbow 2000    Urinary tract infection     Visual impairment     Wrist sprain 2005    Resulted from  falling       Past Surgical History:   Procedure Laterality Date    ADENOIDECTOMY  1956    With tonsilectomy    APPENDECTOMY  1968    CARDIAC CATHETERIZATION      CATARACT EXTRACTION Bilateral 2012    COLONOSCOPY  6/2:    Colonoscopy and upper GI    HYSTERECTOMY  1996    SHOULDER ARTHROSCOPY Right 02/19/2024    arthroscopic rotator cuff repair, arthroscopic biceps tenodesis, arthroscopic extensive debridement, arthroscopic subacromial decompression with acromioplasty, Dr. Regan Vann    SHOULDER SURGERY  02/24    Right Rotator cuff, bicep trars    SINUS SURGERY      x 2    TONSILLECTOMY AND ADENOIDECTOMY      TUBAL ABDOMINAL LIGATION  1979       Family History   Problem Relation Age of Onset    Diabetes Mother     Arthritis Mother     Hypertension Mother     Osteoarthritis Mother     Hearing loss Mother     Osteoporosis Mother     Heart failure Father     Bipolar disorder Father     Alcohol abuse Father     Depression Father     Hearing loss Father     Hyperlipidemia Father     Anxiety disorder Daughter     Depression Daughter     Anxiety disorder Daughter     Depression Daughter     Developmental Disability Daughter     Learning disabilities Daughter     Hypertension Brother     Thyroid disease Brother     Anesthesia problems Brother     Clotting disorder Brother     Diabetes Brother        Social History     Socioeconomic History    Marital status:    Tobacco Use    Smoking status: Never     Passive exposure: Past    Smokeless tobacco: Never   Vaping Use    Vaping status: Never Used   Substance and Sexual Activity    Alcohol use: Yes     Alcohol/week: 1.0 - 2.0 standard drink of alcohol     Types: 1 - 2 Glasses of wine per week    Drug use: Never    Sexual activity: Yes     Partners: Male     Birth control/protection: Post-menopausal, Tubal ligation, Hysterectomy       Allergies   Allergen Reactions    Ace Inhibitors Anaphylaxis and Other (See Comments)     Taken with avelox - throat swelling     "Fluticasone Other (See Comments)     \"Increases heart rate, dizziness, felt like something is not right\"    Moxifloxacin Other (See Comments)     When taken with With lisinopril- throat swelling    Pravastatin Myalgia     Stopped per Dr Ernandez due to leg cramps severe       ROS  Review of Systems   Constitutional:  Positive for fatigue. Negative for chills and fever.   Respiratory:  Negative for cough, shortness of breath and wheezing.    Cardiovascular:  Negative for chest pain, palpitations and leg swelling.   Gastrointestinal:  Positive for diarrhea.   Genitourinary:  Positive for dysuria.   Musculoskeletal:  Positive for arthralgias and myalgias.   Neurological:  Negative for dizziness and headache.   Psychiatric/Behavioral:  Positive for behavioral problems, decreased concentration, depressed mood and stress. Negative for self-injury and suicidal ideas. The patient is nervous/anxious.        Vitals:    07/22/24 1304   BP: 118/74   Pulse: 66   SpO2: 97%   Weight: 63.4 kg (139 lb 12.8 oz)   Height: 152.4 cm (60\")   PainSc: 0-No pain     Body mass index is 27.3 kg/m².           Current Outpatient Medications on File Prior to Visit   Medication Sig Dispense Refill    Cholecalciferol 50 MCG (2000 UT) capsule Take  by mouth.      estradiol (ESTRACE) 0.1 MG/GM vaginal cream Insert 2 g into the vagina 2 (Two) Times a Week. 42.5 g 2    levocetirizine (XYZAL) 5 MG tablet Take 1 tablet by mouth Every Evening. 30 tablet 0    nebivolol (BYSTOLIC) 10 MG tablet Take 1 tablet by mouth Daily. 90 tablet 1    Olmesartan-amLODIPine-HCTZ 40-5-25 MG tablet Take 1 tablet by mouth Daily. 90 tablet 1    pantoprazole (PROTONIX) 40 MG EC tablet Take 1 tablet by mouth Daily. 90 tablet 0    rosuvastatin (Crestor) 5 MG tablet Take 1 tablet by mouth Daily. 30 tablet 0    [DISCONTINUED] Alcaftadine 0.25 % solution Apply  to eye(s) as directed by provider.      [DISCONTINUED] atomoxetine (STRATTERA) 40 MG capsule Take 1 capsule by mouth Daily. " 30 capsule 0    [DISCONTINUED] Biotin 1 MG capsule Take 1 tablet by mouth Daily.      [DISCONTINUED] buPROPion (WELLBUTRIN) 75 MG tablet Take 1 tablet by mouth Daily. 30 tablet 0    [DISCONTINUED] butalbital-acetaminophen-caffeine (FIORICET, ESGIC) -40 MG per tablet Take 1 tablet by mouth Every 4 (Four) Hours As Needed for Headache.      [DISCONTINUED] cycloSPORINE (RESTASIS) 0.05 % ophthalmic emulsion Apply 1 drop to eye(s) as directed by provider Every 12 (Twelve) Hours.      [DISCONTINUED] dicyclomine (BENTYL) 10 MG/5ML syrup       [DISCONTINUED] famotidine (PEPCID) 40 MG tablet Take 1 tablet by mouth At Night As Needed for Heartburn. 90 tablet 1    [DISCONTINUED] loteprednol (Lotemax) 0.5 % ophthalmic suspension Apply 1 drop to eye(s) as directed by provider 3 (Three) Times a Day.      [DISCONTINUED] meloxicam (MOBIC) 15 MG tablet Take 1 tablet by mouth Daily. 90 tablet 0    [DISCONTINUED] montelukast (SINGULAIR) 10 MG tablet Take 1 tablet by mouth Every Night. 90 tablet 0    [DISCONTINUED] naratriptan (AMERGE) 2.5 MG tablet Take 1 tablet by mouth 1 (One) Time As Needed for Migraine. 2.5 mg at onset of headache, may repeat in 4 hours if needed      [DISCONTINUED] nitrofurantoin, macrocrystal-monohydrate, (Macrobid) 100 MG capsule Take 1 tablet after intercourse.  Do not exceed 2 tablets weekly. 8 capsule 2    [DISCONTINUED] Omega-3 Fatty Acids (fish oil) 1200 MG capsule capsule       [DISCONTINUED] omeprazole (priLOSEC) 20 MG capsule Take 1 capsule by mouth Daily.      [DISCONTINUED] probiotic (CULTURELLE) capsule capsule Take  by mouth Daily.      [DISCONTINUED] SUMAtriptan (IMITREX) 25 MG tablet Take 1 tablet by mouth.      [DISCONTINUED] TiZANidine (ZANAFLEX) 2 MG capsule Take 1 capsule by mouth 3 (Three) Times a Day.      [DISCONTINUED] venlafaxine XR (EFFEXOR-XR) 75 MG 24 hr capsule Take 3 capsules by mouth Daily. 270 capsule 1     No current facility-administered medications on file prior to visit.        Results for orders placed or performed in visit on 07/22/24   POC Urinalysis Dipstick, Automated    Specimen: Urine   Result Value Ref Range    Color Yellow Yellow, Straw, Dark Yellow, Aissatou    Clarity, UA Clear Clear    Specific Gravity  1.015 1.005 - 1.030    pH, Urine 6.0 5.0 - 8.0    Leukocytes Small (1+) (A) Negative    Nitrite, UA Negative Negative    Protein, POC Negative Negative mg/dL    Glucose, UA Negative Negative mg/dL    Ketones, UA Negative Negative    Urobilinogen, UA Normal Normal, 0.2 E.U./dL    Bilirubin Negative Negative    Blood, UA Negative Negative    Lot Number [id number]     Expiration Date 3/3/26        PE    Physical Exam  Vitals reviewed.   Constitutional:       General: She is not in acute distress.     Appearance: Normal appearance. She is well-developed and normal weight. She is not ill-appearing or diaphoretic.   HENT:      Head: Normocephalic and atraumatic.   Eyes:      Extraocular Movements: Extraocular movements intact.      Conjunctiva/sclera: Conjunctivae normal.   Cardiovascular:      Rate and Rhythm: Normal rate and regular rhythm.      Heart sounds: Normal heart sounds.   Pulmonary:      Effort: Pulmonary effort is normal.      Breath sounds: Normal breath sounds.   Musculoskeletal:         General: Normal range of motion.      Cervical back: Normal range of motion.      Right lower leg: No edema.      Left lower leg: No edema.   Skin:     General: Skin is warm.      Findings: No erythema or rash.   Neurological:      General: No focal deficit present.      Mental Status: She is alert.   Psychiatric:         Attention and Perception: Attention and perception normal. She is attentive.         Mood and Affect: Mood is anxious and depressed.         Speech: Speech normal.         Behavior: Behavior is agitated. Behavior is cooperative.         Thought Content: Thought content normal.         Cognition and Memory: Memory is impaired.         Judgment: Judgment normal.            A/P    Diagnoses and all orders for this visit:    1. Dysuria (Primary)  -     POC Urinalysis Dipstick, Automated  -     Urine Culture - , Urine, Clean Catch; Future  Urinalysis shows leukocytes.  Will culture.  Treatment pending culture results.  Completed Macrobid 3 days ago.    2. Recurrent UTI  Patient declines referral to urology.  States she saw a gynecologist urologist at  and will reach out to them to schedule an appointment.    3. Primary hypertension  Stable, well-controlled.  Compliant on medication.    4. Attention deficit disorder, unspecified hyperactivity presence  -     atomoxetine (STRATTERA) 40 MG capsule; Take 1 capsule by mouth Daily.  Dispense: 90 capsule; Refill: 1  -     Cancel: Ambulatory Referral to Behavioral Health    5. Anxiety  -     FLUoxetine (PROzac) 20 MG capsule; Take 1 capsule by mouth Daily.  Dispense: 90 capsule; Refill: 1  -     Ambulatory Referral to Behavioral Health    6. Moderate episode of recurrent major depressive disorder  -     FLUoxetine (PROzac) 20 MG capsule; Take 1 capsule by mouth Daily.  Dispense: 90 capsule; Refill: 1  -     Ambulatory Referral to Behavioral Health  Patient abruptly discontinued her Wellbutrin, Effexor and Strattera about 3 weeks ago.  Will start referral to psychiatry.  She had genetic testing done showing multiple medication intolerances.  Will start patient on prozac 20 mg for anxiety and depression.  Restart strattera since she reports significant decline in attention and focus since stopping medicine.  No suicidal ideation.  Patient aware to go to ER if she has suicidal thoughts/plan.  Return to office in 4 weeks.    7. Diarrhea, unspecified type  -     Clostridioides difficile Toxin - Stool, Per Rectum; Future  R/o C. Diff with multiple antibiotic use.  Recommend eating foods with probiotics.  Hydrate well with water.    8. Age-related osteoporosis without current pathological fracture  Hold off on Prolia infusion until kidney  function returns to normal.  Repeat CMP today.  Phosphorus and magnesium normal within the last few weeks.       Plan of care reviewed with patient at the conclusion of today's visit. Education was provided regarding diagnosis, management and any prescribed or recommended OTC medications.  Patient verbalizes understanding of and agreement with management plan.    Dictated Utilizing Dragon Dictation     Please note that portions of this note were completed with a voice recognition program.     Part of this note may be an electronic transcription/translation of spoken language to printed text using the Dragon Dictation System.    Return in about 4 weeks (around 8/19/2024) for Recheck, mood/recurrent UTI.     Marsha Kraft PA-C

## 2024-07-23 ENCOUNTER — TELEPHONE (OUTPATIENT)
Dept: FAMILY MEDICINE CLINIC | Facility: CLINIC | Age: 74
End: 2024-07-23
Payer: MEDICARE

## 2024-07-23 LAB — 25(OH)D3 SERPL-MCNC: 58.3 NG/ML (ref 30–100)

## 2024-07-23 RX ORDER — MAGNESIUM OXIDE 400 MG/1
400 TABLET ORAL DAILY
Qty: 90 TABLET | Refills: 1 | Status: SHIPPED | OUTPATIENT
Start: 2024-07-23

## 2024-07-23 NOTE — TELEPHONE ENCOUNTER
No answer, left message for patient regarding labs.  Will send in notes to [facility] [employer] as well.

## 2024-07-24 ENCOUNTER — LAB (OUTPATIENT)
Dept: LAB | Facility: HOSPITAL | Age: 74
End: 2024-07-24
Payer: MEDICARE

## 2024-07-24 DIAGNOSIS — R19.7 DIARRHEA, UNSPECIFIED TYPE: ICD-10-CM

## 2024-07-24 LAB
BACTERIA SPEC AEROBE CULT: ABNORMAL
C DIFF TOX GENS STL QL NAA+PROBE: NOT DETECTED

## 2024-07-24 PROCEDURE — 87493 C DIFF AMPLIFIED PROBE: CPT

## 2024-07-24 RX ORDER — CEPHALEXIN 500 MG/1
500 CAPSULE ORAL 2 TIMES DAILY
Qty: 14 CAPSULE | Refills: 0 | Status: SHIPPED | OUTPATIENT
Start: 2024-07-24 | End: 2024-07-31

## 2024-07-25 DIAGNOSIS — N39.0 RECURRENT UTI: Primary | ICD-10-CM

## 2024-07-30 ENCOUNTER — TELEPHONE (OUTPATIENT)
Dept: FAMILY MEDICINE CLINIC | Facility: CLINIC | Age: 74
End: 2024-07-30
Payer: MEDICARE

## 2024-07-30 NOTE — TELEPHONE ENCOUNTER
Provider: ALDO ALBERTS     Caller: SABINA NARAYAN        Phone Number: [phone]     Reason for Call: [facility] WITH [facility] CALLING TO SEE IF THE OFFICE  RECEIVED MEDICAL RECORDS FROM THEM.

## 2024-08-01 RX ORDER — PANTOPRAZOLE SODIUM 40 MG/1
40 TABLET, DELAYED RELEASE ORAL DAILY
Qty: 90 TABLET | Refills: 3 | Status: SHIPPED | OUTPATIENT
Start: 2024-08-01

## 2024-08-01 NOTE — TELEPHONE ENCOUNTER
Rx Refill Note  Requested Prescriptions     Pending Prescriptions Disp Refills    pantoprazole (PROTONIX) 40 MG EC tablet [Pharmacy Med Name: PANTOPRAZOLE SODIUM DR TABS 40MG] 90 tablet 3     Sig: TAKE 1 TABLET DAILY      Last office visit with prescribing clinician: 7/22/2024   Last telemedicine visit with prescribing clinician: Visit date not found   Next office visit with prescribing clinician: 8/21/2024       Meli Reyes MA  08/01/24, 10:26 EDT

## 2024-08-15 ENCOUNTER — OFFICE VISIT (OUTPATIENT)
Dept: ORTHOPEDIC SURGERY | Facility: CLINIC | Age: 74
End: 2024-08-15
Payer: MEDICARE

## 2024-08-15 VITALS
HEIGHT: 60 IN | SYSTOLIC BLOOD PRESSURE: 122 MMHG | DIASTOLIC BLOOD PRESSURE: 78 MMHG | WEIGHT: 136.8 LBS | BODY MASS INDEX: 26.86 KG/M2

## 2024-08-15 DIAGNOSIS — Z98.890 STATUS POST RIGHT ROTATOR CUFF REPAIR: Primary | ICD-10-CM

## 2024-08-15 NOTE — PROGRESS NOTES
[facility] Follow Up       Office Follow Up Visit       Patient Name: Colleen Morgan    Chief Complaint:   Chief Complaint   Patient presents with    Follow-up     3 month f/u; status post arthroscopic rotator cuff repair, arthroscopic biceps tenodesis, arthroscopic extensive debridement, arthroscopic subacromial decompression with acromioplasty, Right; DOS (02/19/2024)       Referring Physician: No ref. provider found    History of Present Illness:   Colleen Morgan returns to clinic today for follow-up visit almost 6 months s/p right shoulder arthroscopic rotator cuff repair, biceps tenodesis, extensive debridement, subacromial decompression with Dr. Vann.  Last visit on 5/9/2024.  She reports improvements since last visit. Great range of motion. She feels like her strength is improving.     Procedure:  1.     Arthroscopic rotator cuff repair (1x2) - CPT 45280  2.     Arthroscopic biceps tenodesis (8-8) - CPT 38741  3.    Arthroscopic extensive debridement - CPT 28914  4.     Arthroscopic subacromial decompression with acromioplasty - CPT 90110       Subjective     Review of Systems   Constitutional:  Negative for chills, fever, unexpected weight gain and unexpected weight loss.   HENT:  Negative for congestion, postnasal drip and rhinorrhea.    Eyes:  Negative for blurred vision.   Respiratory:  Negative for shortness of breath.    Cardiovascular:  Negative for leg swelling.   Gastrointestinal:  Negative for abdominal pain, nausea and vomiting.   Genitourinary:  Negative for difficulty urinating.   Musculoskeletal:  Positive for arthralgias. Negative for gait problem, joint swelling and myalgias.   Skin:  Negative for skin lesions and wound.   Neurological:  Negative for dizziness, weakness, light-headedness and numbness.   Hematological:  Does not bruise/bleed easily.   Psychiatric/Behavioral:  Negative for  "depressed mood.         I have reviewed and updated the following portions of the patient's history and review of systems: allergies, current medications, past family history, past medical history, past social history, past surgical history and problem list.    Medications:   Current Outpatient Medications:     atomoxetine (STRATTERA) 40 MG capsule, Take 1 capsule by mouth Daily., Disp: 90 capsule, Rfl: 1    Cholecalciferol 50 MCG (2000 UT) capsule, Take  by mouth., Disp: , Rfl:     estradiol (ESTRACE) 0.1 MG/GM vaginal cream, Insert 2 g into the vagina 2 (Two) Times a Week., Disp: 42.5 g, Rfl: 2    FLUoxetine (PROzac) 20 MG capsule, Take 1 capsule by mouth Daily., Disp: 90 capsule, Rfl: 1    levocetirizine (XYZAL) 5 MG tablet, Take 1 tablet by mouth Every Evening., Disp: 30 tablet, Rfl: 0    magnesium oxide (MAG-OX) 400 MG tablet, Take 1 tablet by mouth Daily., Disp: 90 tablet, Rfl: 1    nebivolol (BYSTOLIC) 10 MG tablet, Take 1 tablet by mouth Daily., Disp: 90 tablet, Rfl: 1    Olmesartan-amLODIPine-HCTZ 40-5-25 MG tablet, Take 1 tablet by mouth Daily., Disp: 90 tablet, Rfl: 1    pantoprazole (PROTONIX) 40 MG EC tablet, TAKE 1 TABLET DAILY, Disp: 90 tablet, Rfl: 3    rosuvastatin (Crestor) 5 MG tablet, Take 1 tablet by mouth Daily., Disp: 30 tablet, Rfl: 0    buPROPion XL (Wellbutrin XL) 150 MG 24 hr tablet, Take 1 tablet by mouth Daily., Disp: 30 tablet, Rfl: 1    Allergies:   Allergies   Allergen Reactions    Ace Inhibitors Anaphylaxis and Other (See Comments)     Taken with avelox - throat swelling    Fluticasone Other (See Comments)     \"Increases heart rate, dizziness, felt like something is not right\"    Moxifloxacin Other (See Comments)     When taken with With lisinopril- throat swelling    Pravastatin Myalgia     Stopped per Dr Ernandez due to leg cramps severe         Objective      Vital Signs:   Vitals:    08/15/24 1340   BP: 122/78   Weight: 62.1 kg (136 lb 12.8 oz)   Height: 152.4 cm (60\")       Ortho " Exam:  General: no acute distress, comfortable  Vitals reviewed in chart    Musculoskeletal Exam:    SIDE: Right  shoulder  Incisions well healed    Tenderness: None    Range of motion measurements (degrees): 160/140/60/60  Painful arc of motion: No  Negative lag sign  Great rotator cuff strength  No evidence of septic joint      Results Review:  None      Assessment / Plan      Assessment:   Diagnoses and all orders for this visit:    1. Status post right rotator cuff repair (Primary)        Quality Metrics:   BMI:   BMI is >= 25 and <30. (Overweight) The following options were offered after discussion;: weight loss educational material (shared in after visit summary)       Tobacco:   Colleen Morgan  reports that she has never smoked. She has been exposed to tobacco smoke. She has never used smokeless tobacco.        Plan:  6 months s/p right rotator cuff repair with Dr. Vann.  She has made an excellent recovery with range of motion and strength.  Encouraged to continue with exercises at home as needed.  She will keep me updated regarding any issues or concerns going forward.      Follow Up:   Return as needed.    Sonia Quinones PA-C  [facility]    Dictated using Dragon Speech Recognition.

## 2024-08-21 ENCOUNTER — APPOINTMENT (OUTPATIENT)
Dept: LAB | Facility: HOSPITAL | Age: 74
End: 2024-08-21
Payer: MEDICARE

## 2024-08-21 ENCOUNTER — OFFICE VISIT (OUTPATIENT)
Dept: FAMILY MEDICINE CLINIC | Facility: CLINIC | Age: 74
End: 2024-08-21
Payer: MEDICARE

## 2024-08-21 ENCOUNTER — LAB (OUTPATIENT)
Dept: LAB | Facility: HOSPITAL | Age: 74
End: 2024-08-21
Payer: MEDICARE

## 2024-08-21 VITALS
HEART RATE: 60 BPM | WEIGHT: 139.4 LBS | TEMPERATURE: 97.8 F | BODY MASS INDEX: 27.37 KG/M2 | HEIGHT: 60 IN | SYSTOLIC BLOOD PRESSURE: 124 MMHG | OXYGEN SATURATION: 96 % | DIASTOLIC BLOOD PRESSURE: 62 MMHG

## 2024-08-21 DIAGNOSIS — M81.0 AGE-RELATED OSTEOPOROSIS WITHOUT CURRENT PATHOLOGICAL FRACTURE: ICD-10-CM

## 2024-08-21 DIAGNOSIS — E83.52 HYPERCALCEMIA: ICD-10-CM

## 2024-08-21 DIAGNOSIS — F33.1 MODERATE EPISODE OF RECURRENT MAJOR DEPRESSIVE DISORDER: ICD-10-CM

## 2024-08-21 DIAGNOSIS — R30.0 DYSURIA: ICD-10-CM

## 2024-08-21 DIAGNOSIS — R82.90 ABNORMAL URINALYSIS: ICD-10-CM

## 2024-08-21 DIAGNOSIS — N39.0 RECURRENT UTI: Primary | ICD-10-CM

## 2024-08-21 LAB
BILIRUB BLD-MCNC: NEGATIVE MG/DL
CA-I SERPL ISE-MCNC: 1.53 MMOL/L (ref 1.15–1.35)
CLARITY, POC: ABNORMAL
COLOR UR: YELLOW
EXPIRATION DATE: ABNORMAL
GLUCOSE UR STRIP-MCNC: NEGATIVE MG/DL
KETONES UR QL: NEGATIVE
LEUKOCYTE EST, POC: ABNORMAL
Lab: ABNORMAL
MAGNESIUM SERPL-MCNC: 2.2 MG/DL (ref 1.6–2.4)
NITRITE UR-MCNC: NEGATIVE MG/ML
PH UR: 6 [PH] (ref 5–8)
PHOSPHATE SERPL-MCNC: 2.8 MG/DL (ref 2.5–4.5)
PROT UR STRIP-MCNC: NEGATIVE MG/DL
RBC # UR STRIP: NEGATIVE /UL
SP GR UR: 1.01 (ref 1–1.03)
UROBILINOGEN UR QL: NORMAL

## 2024-08-21 PROCEDURE — 87088 URINE BACTERIA CULTURE: CPT | Performed by: PHYSICIAN ASSISTANT

## 2024-08-21 PROCEDURE — 87186 SC STD MICRODIL/AGAR DIL: CPT | Performed by: PHYSICIAN ASSISTANT

## 2024-08-21 PROCEDURE — 87086 URINE CULTURE/COLONY COUNT: CPT | Performed by: PHYSICIAN ASSISTANT

## 2024-08-21 PROCEDURE — 81003 URINALYSIS AUTO W/O SCOPE: CPT | Performed by: FAMILY MEDICINE

## 2024-08-21 PROCEDURE — 80048 BASIC METABOLIC PNL TOTAL CA: CPT

## 2024-08-21 PROCEDURE — 82330 ASSAY OF CALCIUM: CPT

## 2024-08-21 PROCEDURE — 84100 ASSAY OF PHOSPHORUS: CPT

## 2024-08-21 PROCEDURE — 1159F MED LIST DOCD IN RCRD: CPT | Performed by: FAMILY MEDICINE

## 2024-08-21 PROCEDURE — 3074F SYST BP LT 130 MM HG: CPT | Performed by: FAMILY MEDICINE

## 2024-08-21 PROCEDURE — 99214 OFFICE O/P EST MOD 30 MIN: CPT | Performed by: FAMILY MEDICINE

## 2024-08-21 PROCEDURE — G2211 COMPLEX E/M VISIT ADD ON: HCPCS | Performed by: FAMILY MEDICINE

## 2024-08-21 PROCEDURE — 36415 COLL VENOUS BLD VENIPUNCTURE: CPT

## 2024-08-21 PROCEDURE — 83970 ASSAY OF PARATHORMONE: CPT

## 2024-08-21 PROCEDURE — 3078F DIAST BP <80 MM HG: CPT | Performed by: FAMILY MEDICINE

## 2024-08-21 PROCEDURE — 1160F RVW MEDS BY RX/DR IN RCRD: CPT | Performed by: FAMILY MEDICINE

## 2024-08-21 PROCEDURE — 83735 ASSAY OF MAGNESIUM: CPT

## 2024-08-21 PROCEDURE — 1126F AMNT PAIN NOTED NONE PRSNT: CPT | Performed by: FAMILY MEDICINE

## 2024-08-21 RX ORDER — BUPROPION HYDROCHLORIDE 150 MG/1
150 TABLET ORAL DAILY
Qty: 30 TABLET | Refills: 1 | Status: SHIPPED | OUTPATIENT
Start: 2024-08-21

## 2024-08-22 ENCOUNTER — TELEPHONE (OUTPATIENT)
Dept: FAMILY MEDICINE CLINIC | Facility: CLINIC | Age: 74
End: 2024-08-22
Payer: MEDICARE

## 2024-08-22 DIAGNOSIS — E21.3 HYPERPARATHYROIDISM: ICD-10-CM

## 2024-08-22 DIAGNOSIS — E83.52 HYPERCALCEMIA: Primary | ICD-10-CM

## 2024-08-22 LAB
ANION GAP SERPL CALCULATED.3IONS-SCNC: 12 MMOL/L (ref 5–15)
BUN SERPL-MCNC: 19 MG/DL (ref 8–23)
BUN/CREAT SERPL: 16.1 (ref 7–25)
CALCIUM SPEC-SCNC: 11.2 MG/DL (ref 8.6–10.5)
CHLORIDE SERPL-SCNC: 95 MMOL/L (ref 98–107)
CO2 SERPL-SCNC: 25 MMOL/L (ref 22–29)
CREAT SERPL-MCNC: 1.18 MG/DL (ref 0.57–1)
EGFRCR SERPLBLD CKD-EPI 2021: 48.6 ML/MIN/1.73
GLUCOSE SERPL-MCNC: 88 MG/DL (ref 65–99)
POTASSIUM SERPL-SCNC: 4.3 MMOL/L (ref 3.5–5.2)
PTH-INTACT SERPL-MCNC: 100 PG/ML (ref 15–65)
SODIUM SERPL-SCNC: 132 MMOL/L (ref 136–145)

## 2024-08-22 NOTE — TELEPHONE ENCOUNTER
Spoke with patient regarding labs.  Prior to moving near [place], she was seeing na endocrinologist for her hypercalcemia and hyperparathyroidism.  She is not on calcium.  Taking vitamin D. Okay to resume Prolia infusions - she will call to schedule.    Will place referral for endocrinology.

## 2024-08-23 LAB — BACTERIA SPEC AEROBE CULT: ABNORMAL

## 2024-08-24 DIAGNOSIS — N30.01 ACUTE CYSTITIS WITH HEMATURIA: Primary | ICD-10-CM

## 2024-08-24 RX ORDER — CEFDINIR 300 MG/1
300 CAPSULE ORAL 2 TIMES DAILY
Qty: 14 CAPSULE | Refills: 0 | Status: SHIPPED | OUTPATIENT
Start: 2024-08-24 | End: 2024-08-31

## 2024-08-28 DIAGNOSIS — J30.2 SEASONAL ALLERGIES: ICD-10-CM

## 2024-08-28 RX ORDER — LEVOCETIRIZINE DIHYDROCHLORIDE 5 MG/1
5 TABLET, FILM COATED ORAL EVERY EVENING
Qty: 90 TABLET | Refills: 3 | Status: SHIPPED | OUTPATIENT
Start: 2024-08-28

## 2024-09-03 ENCOUNTER — HOSPITAL ENCOUNTER (OUTPATIENT)
Dept: NEUROLOGY | Facility: HOSPITAL | Age: 74
Discharge: HOME | End: 2024-09-03
Admitting: PHYSICIAN ASSISTANT
Payer: MEDICARE

## 2024-09-03 DIAGNOSIS — M79.604 RIGHT LEG PAIN: ICD-10-CM

## 2024-09-03 PROCEDURE — 95886 MUSC TEST DONE W/N TEST COMP: CPT

## 2024-09-03 PROCEDURE — 95909 NRV CNDJ TST 5-6 STUDIES: CPT

## 2024-09-03 PROCEDURE — 95910 NRV CNDJ TEST 7-8 STUDIES: CPT

## 2024-09-10 ENCOUNTER — INFUSION (OUTPATIENT)
Dept: ONCOLOGY | Facility: HOSPITAL | Age: 74
End: 2024-09-10
Payer: MEDICARE

## 2024-09-10 VITALS
DIASTOLIC BLOOD PRESSURE: 79 MMHG | BODY MASS INDEX: 27.54 KG/M2 | TEMPERATURE: 97.9 F | WEIGHT: 141 LBS | SYSTOLIC BLOOD PRESSURE: 127 MMHG | RESPIRATION RATE: 18 BRPM | HEART RATE: 77 BPM

## 2024-09-10 DIAGNOSIS — M81.0 AGE-RELATED OSTEOPOROSIS WITHOUT CURRENT PATHOLOGICAL FRACTURE: Primary | ICD-10-CM

## 2024-09-10 PROCEDURE — 96372 THER/PROPH/DIAG INJ SC/IM: CPT

## 2024-09-10 PROCEDURE — 25010000002 DENOSUMAB 60 MG/ML SOLUTION PREFILLED SYRINGE: Performed by: PHYSICIAN ASSISTANT

## 2024-09-10 RX ADMIN — DENOSUMAB 60 MG: 60 INJECTION SUBCUTANEOUS at 14:55

## 2024-09-17 ENCOUNTER — OFFICE VISIT (OUTPATIENT)
Dept: FAMILY MEDICINE CLINIC | Facility: CLINIC | Age: 74
End: 2024-09-17
Payer: MEDICARE

## 2024-09-17 ENCOUNTER — OFFICE VISIT (OUTPATIENT)
Dept: UROLOGY | Facility: CLINIC | Age: 74
End: 2024-09-17
Payer: MEDICARE

## 2024-09-17 ENCOUNTER — HOSPITAL ENCOUNTER (OUTPATIENT)
Dept: GENERAL RADIOLOGY | Facility: HOSPITAL | Age: 74
Discharge: HOME | End: 2024-09-17
Admitting: PHYSICIAN ASSISTANT
Payer: MEDICARE

## 2024-09-17 VITALS
WEIGHT: 141 LBS | OXYGEN SATURATION: 98 % | SYSTOLIC BLOOD PRESSURE: 128 MMHG | DIASTOLIC BLOOD PRESSURE: 64 MMHG | BODY MASS INDEX: 27.68 KG/M2 | HEART RATE: 60 BPM | HEIGHT: 60 IN

## 2024-09-17 VITALS
SYSTOLIC BLOOD PRESSURE: 121 MMHG | HEIGHT: 60 IN | WEIGHT: 141 LBS | HEART RATE: 62 BPM | DIASTOLIC BLOOD PRESSURE: 69 MMHG | OXYGEN SATURATION: 99 % | BODY MASS INDEX: 27.68 KG/M2

## 2024-09-17 DIAGNOSIS — Z87.442 HISTORY OF NEPHROLITHIASIS: ICD-10-CM

## 2024-09-17 DIAGNOSIS — M25.551 RIGHT HIP PAIN: ICD-10-CM

## 2024-09-17 DIAGNOSIS — F41.9 ANXIETY: ICD-10-CM

## 2024-09-17 DIAGNOSIS — I10 PRIMARY HYPERTENSION: ICD-10-CM

## 2024-09-17 DIAGNOSIS — F33.1 MODERATE EPISODE OF RECURRENT MAJOR DEPRESSIVE DISORDER: Primary | ICD-10-CM

## 2024-09-17 DIAGNOSIS — R19.7 DIARRHEA, UNSPECIFIED TYPE: ICD-10-CM

## 2024-09-17 DIAGNOSIS — N39.0 RECURRENT UTI: Primary | ICD-10-CM

## 2024-09-17 LAB
BILIRUB BLD-MCNC: NEGATIVE MG/DL
CLARITY, POC: CLEAR
COLOR UR: YELLOW
EXPIRATION DATE: NORMAL
GLUCOSE UR STRIP-MCNC: NEGATIVE MG/DL
KETONES UR QL: NEGATIVE
LEUKOCYTE EST, POC: NEGATIVE
Lab: NORMAL
NITRITE UR-MCNC: NEGATIVE MG/ML
PH UR: 6.5 [PH] (ref 5–8)
PROT UR STRIP-MCNC: NEGATIVE MG/DL
RBC # UR STRIP: NEGATIVE /UL
SP GR UR: 1.01 (ref 1–1.03)
UROBILINOGEN UR QL: NORMAL

## 2024-09-17 PROCEDURE — 3078F DIAST BP <80 MM HG: CPT | Performed by: PHYSICIAN ASSISTANT

## 2024-09-17 PROCEDURE — 81003 URINALYSIS AUTO W/O SCOPE: CPT | Performed by: NURSE PRACTITIONER

## 2024-09-17 PROCEDURE — 3078F DIAST BP <80 MM HG: CPT | Performed by: NURSE PRACTITIONER

## 2024-09-17 PROCEDURE — 3074F SYST BP LT 130 MM HG: CPT | Performed by: PHYSICIAN ASSISTANT

## 2024-09-17 PROCEDURE — 3074F SYST BP LT 130 MM HG: CPT | Performed by: NURSE PRACTITIONER

## 2024-09-17 PROCEDURE — 1160F RVW MEDS BY RX/DR IN RCRD: CPT | Performed by: PHYSICIAN ASSISTANT

## 2024-09-17 PROCEDURE — 73502 X-RAY EXAM HIP UNI 2-3 VIEWS: CPT

## 2024-09-17 PROCEDURE — 1159F MED LIST DOCD IN RCRD: CPT | Performed by: PHYSICIAN ASSISTANT

## 2024-09-17 PROCEDURE — 99214 OFFICE O/P EST MOD 30 MIN: CPT | Performed by: PHYSICIAN ASSISTANT

## 2024-09-17 PROCEDURE — 51798 US URINE CAPACITY MEASURE: CPT | Performed by: NURSE PRACTITIONER

## 2024-09-17 PROCEDURE — 1126F AMNT PAIN NOTED NONE PRSNT: CPT | Performed by: PHYSICIAN ASSISTANT

## 2024-09-17 PROCEDURE — G2211 COMPLEX E/M VISIT ADD ON: HCPCS | Performed by: PHYSICIAN ASSISTANT

## 2024-09-17 PROCEDURE — 99203 OFFICE O/P NEW LOW 30 MIN: CPT | Performed by: NURSE PRACTITIONER

## 2024-09-17 RX ORDER — FLUOXETINE 40 MG/1
40 CAPSULE ORAL DAILY
Qty: 90 CAPSULE | Refills: 0 | Status: SHIPPED | OUTPATIENT
Start: 2024-09-17

## 2024-09-19 ENCOUNTER — PATIENT ROUNDING (BHMG ONLY) (OUTPATIENT)
Dept: UROLOGY | Facility: CLINIC | Age: 74
End: 2024-09-19
Payer: MEDICARE

## 2024-09-21 DIAGNOSIS — F33.1 MODERATE EPISODE OF RECURRENT MAJOR DEPRESSIVE DISORDER: ICD-10-CM

## 2024-09-21 RX ORDER — BUPROPION HYDROCHLORIDE 150 MG/1
150 TABLET ORAL DAILY
Qty: 30 TABLET | Refills: 1 | Status: CANCELLED | OUTPATIENT
Start: 2024-09-21

## 2024-09-23 DIAGNOSIS — F33.1 MODERATE EPISODE OF RECURRENT MAJOR DEPRESSIVE DISORDER: ICD-10-CM

## 2024-09-23 RX ORDER — BUPROPION HYDROCHLORIDE 150 MG/1
150 TABLET ORAL DAILY
Qty: 90 TABLET | Refills: 0 | Status: SHIPPED | OUTPATIENT
Start: 2024-09-23

## 2024-09-24 ENCOUNTER — LAB (OUTPATIENT)
Dept: LAB | Facility: HOSPITAL | Age: 74
End: 2024-09-24
Payer: MEDICARE

## 2024-09-24 DIAGNOSIS — R19.7 DIARRHEA, UNSPECIFIED TYPE: ICD-10-CM

## 2024-09-24 LAB — C DIFF TOX GENS STL QL NAA+PROBE: NOT DETECTED

## 2024-09-24 PROCEDURE — 87493 C DIFF AMPLIFIED PROBE: CPT

## 2024-10-17 RX ORDER — MONTELUKAST SODIUM 10 MG/1
10 TABLET ORAL NIGHTLY
Qty: 90 TABLET | Refills: 3 | OUTPATIENT
Start: 2024-10-17

## 2024-10-25 DIAGNOSIS — F98.8 ATTENTION DEFICIT DISORDER, UNSPECIFIED HYPERACTIVITY PRESENCE: Chronic | ICD-10-CM

## 2024-10-25 DIAGNOSIS — N95.2 VAGINAL ATROPHY: ICD-10-CM

## 2024-10-25 DIAGNOSIS — R41.840 ATTENTION AND CONCENTRATION DEFICIT: Primary | ICD-10-CM

## 2024-10-28 ENCOUNTER — TELEPHONE (OUTPATIENT)
Dept: FAMILY MEDICINE CLINIC | Facility: CLINIC | Age: 74
End: 2024-10-28
Payer: MEDICARE

## 2024-10-28 DIAGNOSIS — R41.840 ATTENTION OR CONCENTRATION DEFICIT: Chronic | ICD-10-CM

## 2024-10-28 DIAGNOSIS — N95.2 VAGINAL ATROPHY: ICD-10-CM

## 2024-10-28 RX ORDER — ATOMOXETINE 40 MG/1
40 CAPSULE ORAL DAILY
Qty: 90 CAPSULE | Refills: 1 | Status: CANCELLED | OUTPATIENT
Start: 2024-10-28

## 2024-10-28 RX ORDER — ATOMOXETINE 40 MG/1
40 CAPSULE ORAL DAILY
Qty: 90 CAPSULE | Refills: 1 | Status: SHIPPED | OUTPATIENT
Start: 2024-10-28

## 2024-10-28 RX ORDER — ESTRADIOL 0.1 MG/G
2 CREAM VAGINAL 2 TIMES WEEKLY
Qty: 42.5 G | Refills: 2 | Status: SHIPPED | OUTPATIENT
Start: 2024-10-28 | End: 2024-10-28 | Stop reason: SDUPTHER

## 2024-10-28 RX ORDER — ESTRADIOL 0.1 MG/G
2 CREAM VAGINAL 2 TIMES WEEKLY
Qty: 42.5 G | Refills: 2 | Status: SHIPPED | OUTPATIENT
Start: 2024-10-28

## 2024-10-28 RX ORDER — ESTRADIOL 0.1 MG/G
2 CREAM VAGINAL 2 TIMES WEEKLY
Qty: 42.5 G | Refills: 2 | Status: CANCELLED | OUTPATIENT
Start: 2024-10-28

## 2024-10-28 NOTE — TELEPHONE ENCOUNTER
PATIENT OUT OF MEDICATION. PATIENT WANTS IT TO GO TO [facility] IN [facility]

## 2024-11-01 ENCOUNTER — TELEPHONE (OUTPATIENT)
Dept: FAMILY MEDICINE CLINIC | Facility: CLINIC | Age: 74
End: 2024-11-01
Payer: MEDICARE

## 2024-11-14 ENCOUNTER — OFFICE VISIT (OUTPATIENT)
Age: 74
End: 2024-11-14
Payer: MEDICARE

## 2024-11-14 VITALS
SYSTOLIC BLOOD PRESSURE: 108 MMHG | OXYGEN SATURATION: 97 % | WEIGHT: 142 LBS | BODY MASS INDEX: 27.73 KG/M2 | DIASTOLIC BLOOD PRESSURE: 68 MMHG | HEART RATE: 68 BPM

## 2024-11-14 DIAGNOSIS — F33.1 MODERATE EPISODE OF RECURRENT MAJOR DEPRESSIVE DISORDER: ICD-10-CM

## 2024-11-14 DIAGNOSIS — F41.9 ANXIETY: ICD-10-CM

## 2024-11-14 RX ORDER — FLUOXETINE 40 MG/1
40 CAPSULE ORAL DAILY
Qty: 90 CAPSULE | Refills: 0 | Status: SHIPPED | OUTPATIENT
Start: 2024-11-14

## 2024-11-14 RX ORDER — BUPROPION HYDROCHLORIDE 300 MG/1
300 TABLET ORAL EVERY MORNING
Qty: 30 TABLET | Refills: 2 | Status: SHIPPED | OUTPATIENT
Start: 2024-11-14 | End: 2025-11-14
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
## 2024-11-18 ENCOUNTER — OFFICE VISIT (OUTPATIENT)
Dept: ENDOCRINOLOGY | Facility: CLINIC | Age: 74
End: 2024-11-18
Payer: MEDICARE

## 2024-11-18 ENCOUNTER — TELEPHONE (OUTPATIENT)
Dept: FAMILY MEDICINE CLINIC | Facility: CLINIC | Age: 74
End: 2024-11-18
Payer: MEDICARE

## 2024-11-18 VITALS
SYSTOLIC BLOOD PRESSURE: 110 MMHG | HEIGHT: 64 IN | BODY MASS INDEX: 24.41 KG/M2 | HEART RATE: 71 BPM | OXYGEN SATURATION: 98 % | DIASTOLIC BLOOD PRESSURE: 68 MMHG | WEIGHT: 143 LBS

## 2024-11-18 DIAGNOSIS — I10 PRIMARY HYPERTENSION: ICD-10-CM

## 2024-11-18 DIAGNOSIS — M81.0 AGE-RELATED OSTEOPOROSIS WITHOUT CURRENT PATHOLOGICAL FRACTURE: ICD-10-CM

## 2024-11-18 DIAGNOSIS — E83.52 HYPERCALCEMIA: Primary | ICD-10-CM

## 2024-11-18 DIAGNOSIS — I10 PRIMARY HYPERTENSION: Primary | ICD-10-CM

## 2024-11-18 DIAGNOSIS — E04.2 MULTIPLE THYROID NODULES: ICD-10-CM

## 2024-11-18 PROCEDURE — 3074F SYST BP LT 130 MM HG: CPT | Performed by: INTERNAL MEDICINE

## 2024-11-18 PROCEDURE — 99204 OFFICE O/P NEW MOD 45 MIN: CPT | Performed by: INTERNAL MEDICINE

## 2024-11-18 PROCEDURE — 3078F DIAST BP <80 MM HG: CPT | Performed by: INTERNAL MEDICINE

## 2024-11-18 RX ORDER — OLMESARTAN MEDOXOMIL 40 MG/1
40 TABLET ORAL DAILY
Qty: 90 TABLET | Refills: 1 | Status: SHIPPED | OUTPATIENT
Start: 2024-11-18

## 2024-11-18 RX ORDER — AMLODIPINE BESYLATE 5 MG/1
5 TABLET ORAL DAILY
Qty: 90 TABLET | Refills: 1 | Status: SHIPPED | OUTPATIENT
Start: 2024-11-18

## 2024-11-18 NOTE — PROGRESS NOTES
Chief Complaint   Patient presents with    Abnormal Calcium        Referring Provider  Marsha Kraft,*     HPI   Colleen Morgan is a 74 y.o. female had concerns including Abnormal Calcium.      New patient referred by PCP for hyperparathyroidism, she also has a history of osteoporosis.    Was previously following with endocrine at another hospital system.  While she meets criteria for parathyroidectomy (osteoporosis, CKD, remote history of nephrolithiasis) she in the past was hoping to avoid surgery.  At the time of diagnosis she was caring for her . She would consider surgery at this time.     She has some fatigue. Gets plenty of sleep.     She takes no calcium supplements.  Is on vitamin D 2000 IUs daily.  Is on an antihypertensive containing HCTZ. Does not take tums.    Osteoporosis was diagnosed in 2018.  She was on Fosamax and Boniva in the past but transitioned to Prolia in 2024.  She had nausea on both fosamax and boniva. She has had a few doses of Prolia, last on September 10, 2024 and is tolerating without difficulty.      Also with a history of thyroid nodules.  No history of FNA.  Last ultrasound was about 4 years ago.    Past Medical History:   Diagnosis Date    ADHD (attention deficit hyperactivity disorder)     Allergic     Arthritis     Arthritis of back 2000    Arthritis of neck ?    Asthma     Bursitis of hip 2008    Colon polyp     Coronary artery disease     Depression     GERD (gastroesophageal reflux disease)     Headache     Heart murmur     Hip arthrosis     HL (hearing loss)     Hyperparathyroidism 2019    Noted by my cardiologist    Hypertension     Irritable bowel syndrome     Low back pain     Low back strain 2010    Neck strain 2001    Osteopenia     Osteoporosis     Periarthritis of shoulder 2024    Found in MRI of rotator cuff    Rotator cuff syndrome     Tennis elbow 2000    Urinary tract infection     Visual impairment     Vitamin D deficiency     Wrist sprain  2005    Resulted from falling     Past Surgical History:   Procedure Laterality Date    ADENOIDECTOMY  1956    With tonsilectomy    APPENDECTOMY  1968    CARDIAC CATHETERIZATION      CATARACT EXTRACTION Bilateral 2012    COLONOSCOPY  6/2:    Colonoscopy and upper GI    HYSTERECTOMY  1996    SHOULDER ARTHROSCOPY Right 02/19/2024    arthroscopic rotator cuff repair, arthroscopic biceps tenodesis, arthroscopic extensive debridement, arthroscopic subacromial decompression with acromioplasty, Dr. Regan Vann    SHOULDER SURGERY  02/24    Right Rotator cuff, bicep trars    SINUS SURGERY      x 2    TONSILLECTOMY AND ADENOIDECTOMY      TUBAL ABDOMINAL LIGATION  1979      Family History   Problem Relation Age of Onset    Diabetes Mother     Arthritis Mother     Hypertension Mother     Osteoarthritis Mother     Hearing loss Mother     Osteoporosis Mother     Heart failure Father     Bipolar disorder Father     Alcohol abuse Father     Depression Father     Hearing loss Father     Hyperlipidemia Father     Anxiety disorder Daughter     Depression Daughter     Anxiety disorder Daughter     Depression Daughter     Developmental Disability Daughter     Learning disabilities Daughter     Hypertension Brother     Thyroid disease Brother     Anesthesia problems Brother     Clotting disorder Brother     Diabetes Brother       Social History     Socioeconomic History    Marital status:    Tobacco Use    Smoking status: Never     Passive exposure: Past    Smokeless tobacco: Never    Tobacco comments:     Tried a few times but quit   Vaping Use    Vaping status: Never Used   Substance and Sexual Activity    Alcohol use: Yes     Alcohol/week: 1.0 - 2.0 standard drink of alcohol     Types: 1 - 2 Glasses of wine per week     Comment: With dinner    Drug use: Never    Sexual activity: Yes     Partners: Male     Birth control/protection: Post-menopausal, Tubal ligation, Hysterectomy      Allergies   Allergen Reactions    Ace  "Inhibitors Anaphylaxis and Other (See Comments)     Taken with avelox - throat swelling    Fluticasone Other (See Comments)     \"Increases heart rate, dizziness, felt like something is not right\"    Moxifloxacin Other (See Comments)     When taken with With lisinopril- throat swelling    Pravastatin Myalgia     Stopped per Dr Ernandez due to leg cramps severe      Current Outpatient Medications on File Prior to Visit   Medication Sig Dispense Refill    atomoxetine (STRATTERA) 40 MG capsule Take 1 capsule by mouth Daily. 90 capsule 1    buPROPion XL (Wellbutrin XL) 300 MG 24 hr tablet Take 1 tablet by mouth Every Morning. 30 tablet 2    cephalexin (KEFLEX) 250 MG capsule Take 1 capsule by mouth Every Night. 90 capsule 1    Cholecalciferol 50 MCG (2000 UT) capsule Take  by mouth.      estradiol (ESTRACE) 0.1 MG/GM vaginal cream Insert 2 g into the vagina 2 (Two) Times a Week. 42.5 g 2    FLUoxetine (PROzac) 40 MG capsule Take 1 capsule by mouth Daily. 90 capsule 0    levocetirizine (XYZAL) 5 MG tablet Take 1 tablet by mouth Every Evening. 90 tablet 3    magnesium oxide (MAG-OX) 400 MG tablet Take 1 tablet by mouth Daily. 90 tablet 1    nebivolol (BYSTOLIC) 10 MG tablet Take 1 tablet by mouth Daily. 90 tablet 1    Olmesartan-amLODIPine-HCTZ 40-5-25 MG tablet Take 1 tablet by mouth Daily. 90 tablet 1    pantoprazole (PROTONIX) 40 MG EC tablet TAKE 1 TABLET DAILY 90 tablet 3    rosuvastatin (Crestor) 5 MG tablet Take 1 tablet by mouth Daily. 30 tablet 0     No current facility-administered medications on file prior to visit.        Review of Systems   Constitutional:  Positive for fatigue.   HENT:  Positive for postnasal drip, sinus pressure and tinnitus.    Eyes:  Positive for pain and itching.   Respiratory:  Positive for shortness of breath.    Gastrointestinal:  Positive for diarrhea and GERD.   Endocrine: Positive for cold intolerance and heat intolerance.        Hair loss   Genitourinary: Negative.  " "  Musculoskeletal:  Positive for back pain, gait problem, myalgias and neck stiffness.   Skin: Negative.    Allergic/Immunologic: Positive for environmental allergies.   Hematological: Negative.    Psychiatric/Behavioral:  Positive for agitation and sleep disturbance.         /68   Pulse 71   Ht 162.6 cm (64\")   Wt 64.9 kg (143 lb)   SpO2 98%   BMI 24.55 kg/m²      Physical Exam    Constitutional:  well developed; well nourished  no acute distress   ENT/Thyroid: no thyromegaly  no palpable nodules   Eyes: EOM intact  Conjunctiva: clear   Respiratory:  breathing is unlabored  clear to auscultation bilaterally   Cardiovascular:  regular rate and rhythm, S1, S2 normal, no murmur, click, rub or gallop   Chest:  Not performed.   Abdomen: Not performed.   : Not performed.   Musculoskeletal: negative findings:  ROM of all joints is normal, no deformities present   Skin: dry and warm   Neuro: normal without focal findings and mental status, speech normal, alert and oriented x3   Psych: oriented to time, place and person, mood and affect are within normal limits     LABS AND IMAGING  A1C:  Lab Results   Component Value Date    HGBA1C 5.40 12/18/2023      CMP:  Lab Results   Component Value Date    GLUCOSE 88 08/21/2024    BUN 19 08/21/2024    CREATININE 1.18 (H) 08/21/2024    CREATININE 1.32 (H) 07/22/2024    CREATININE 1.44 (H) 07/10/2024    CREATININE 1.74 (H) 07/09/2024    CREATININE 0.96 12/18/2023    CREATININE 0.82 08/04/2021     (L) 08/21/2024    K 4.3 08/21/2024    CL 95 (L) 08/21/2024    CALCIUM 11.2 (H) 08/21/2024    CALCIUM 11.7 (H) 07/22/2024    CALCIUM 9.9 07/10/2024    CALCIUM 9.4 07/09/2024    CALCIUM 10.9 (H) 12/18/2023    CALCIUM 10.7 (H) 08/04/2021    PROTEINTOT 7.1 07/22/2024    ALBUMIN 4.0 07/22/2024    ALT 33 07/22/2024    AST 28 07/22/2024    ALKPHOS 102 07/22/2024    BILITOT 0.3 07/22/2024    GLOB 3.1 07/22/2024    AGRATIO 1.3 07/22/2024    BCR 16.1 08/21/2024    ANIONGAP 12.0 " 08/21/2024    EGFR 48.6 (L) 08/21/2024    EGFR 42.5 (L) 07/22/2024    EGFR 38.2 (L) 07/10/2024    EGFR 62.2 12/18/2023      Lipid Panel:  Lab Results   Component Value Date    CHOL 269 (H) 12/18/2023    TRIG 201 (H) 12/18/2023    HDL 76 (H) 12/18/2023     (H) 12/18/2023      TSH:  Lab Results   Component Value Date    TSH 1.600 07/10/2024      Lab Results   Component Value Date    .0 (H) 08/21/2024    PTH 87.0 (H) 12/18/2023    HVNO54SD 58.3 07/22/2024    LQEW30QX 39.4 07/09/2024    GFYU95OD 41.8 01/25/2024     DEXA BONE DENSITY AXIAL  Order: [id number]  Impression    IMPRESSION:     1.The bone mineral density is  osteoporotic.        Secondary causes for low bone mass should be considered in patients with osteopenia and osteoporosis.  Patients with clinical history or radiographic documented fragility fracture have presumed osteoporosis.  All treatments require clinical judgment.     WHO (World Health Organization) criteria for post-menopausal females and males over 50:     T-score = Standard deviation from young normal controls   Z-score = Standard deviation from age match controls     Normal = T-score more positive than -1   Osteopenia = T-score -1.1 to -2.4   Osteoporosis =T-score more negative than -2.5     NOF Recommendations:  The NOF recommends an adult under the age of 50 needs 1,000 mg of calcium and 400-800 IU of vitamin D daily.  Adults 50 and over need 1,200 mg calcium and 800-1,000 IU of vitamin D daily.  Effective therapies for the prevention of osteoporosis include bisphosphonates.      FRAX Version 3.08 (10 year fracture Risk Assessment):  According to NOF and ISCD FRAX applies to untreated postmenopausal women and men ages 40-90 with a hip T-score between -1.1 and -2.4. FRAX is highly dependent on established risk factors.  Risk factors not captured in FRAX model include: frailty, falls, vitamin D deficiency, increased bone turnover, interval significant decline in BMD.  Patients with  a FRAX of greater than 3% in the hip and greater than 20% for major osteoporotic fracture   may  benefit from medical therapy for osteoporosis.     Signed By: Ernesto Jerry  Narrative    INDICATION:  Age-related osteoporosis.     COMPARISON:  DEXA scan dated 12/16/2020       REGION ASSESSED:     L Spine (L 1-4):  BMD=0.979 g/cm2, T-score=-0.6, Z-score=1.7, %Change =1.1%     L Hip:  BMD=0.762 g/cm2, T-score=-1.5, Z-score=0.2, %Change =1.3%   L Femoral Neck:  BMD=0.524 g/cm2, T-score=-2.9, Z-score=-0.9     R Hip:  BMD=0.787 g/cm2, T-score=-1.3, Z-score=0.4, %Change =-3.7%   R Femoral Neck:  BMD=0.560 g/cm2, T-score=-2.6, Z-score=-0.6     FRAX REPORT (WHO 10 Year Fracture Risk Assesment):     The FRAX score is not reported because one or more T-scores of the lumbar spine (total), hip (total) and/or femoral neck are at or below -2.5.  Exam End: 06/22/23 14:56    Specimen Collected: 06/22/23 15:20 Last Resulted: 06/22/23 15:27   Received From: [facility]  Result Received: 12/28/23 14:41     US THYROID  Order: [id number]  Impression    IMPRESSION:     1. Inhomogeneous thyroid gland with bilateral nodules, minimally changed. Recommend follow-up study in one year.       Signed By: Wicho Lane MD  Narrative    EXAM: ULTRASOUND THYROID     INDICATION: Thyroid enlargement, thyroid nodules     COMPARISON: August 2019, August 2018     FINDINGS:     RIGHT LOBE:   Size: 4.2 x 1.5 x 1.6 cm.   Echotexture: Mildly inhomogeneous.   Nodules/cysts:   * 6 x 6 x 5 mm lesion, previously 7 x 6 x 5 and 7 x 6 x 6, mid right lobe, solid (2), hypoechoic (2), wider-than-tall (0), with ill-defined margins(0), with no calcifications (0).  TI-RADS TOTAL: 4-6 Points - TR4 Moderately Suspicious (FNA >= 1.5 cm, Follow >= 1cm, at 1, 2, 3, and 5 years).  No specific follow-up recommended.     *  4 x 4 by 4 mm lesion, previously 4 x 5 x 4 and 4 x 4 by 5, lower right lobe, solid (2), hypoechoic (2), wider-than-tall (0), with ill-defined  margins(0), with no calcifications (0). TI-RADS TOTAL: 4-6 Points - TR4 Moderately Suspicious (FNA >= 1.5 cm, Follow >= 1cm, at 1, 2, 3, and 5 years).  No specific follow-up recommended.       LEFT LOBE:   Size: 5.0 x 1.5 x 1.5 cm.   Echotexture: Mildly inhomogeneous   Nodules/cysts:   * 4 x 3 x 5 mm lesion, previously 5 x 3 x 5 and 4 x 3 x 4, mid to lower left lobe, solid  (2), hypoechoic (2), wider-than-tall (0), with ill-defined margins(0), with no calcifications (0).  TI-RADS TOTAL: 4-6 Points - TR4 Moderately Suspicious (FNA >= 1.5 cm, Follow >= 1cm, at 1, 2, 3, and 5 years).  No specific follow-up recommended.     *  1.4 x 0.7 x 1.2 cm lesion, previously 1.2 x 0.6 x 1.1 and 1.2 x 0.8 x 1.1, lower left lobe, solid  (2), heterogeneously hypoechoic (2), wider-than-tall (0), with ill-defined margins(0), containing macrocalcifications (1).  TI-RADS TOTAL: 4-6 Points - TR4 Moderately Suspicious (FNA >= 1.5 cm, Follow >= 1cm, at 1, 2, 3, and 5 years).  Recommend follow-up study in one year.       ISTHMUS:   Thickness: 3 mm.   Nodules/cysts:   * 7 x 5 x 10 mm lesion, previously 8 x 4 x 6 2019 , leftward isthmus, solid (2), hypoechoic (2), wider-than-tall (0), with ill-defined margins(0), with large comet-tail or no calcifications (0).  TI-RADS TOTAL: 4-6 Points - TR4 Moderately Suspicious (FNA >= 1.5 cm, Follow >= 1cm, at 1, 2, 3, and 5 years).  Recommend follow-up study in one year.       OTHER FINDINGS/LYMPH NODES: None.  Exam End: 08/11/20 16:12    Specimen Collected: 08/11/20 16:54 Last Resulted: 08/11/20 17:05   Received From: [facility]        Assessment and Plan    Diagnoses and all orders for this visit:    1. Hypercalcemia (Primary)  Workup is consistent with primary hyperparathyroidism.  She has a remote history of nephrolithiasis (around 2003), osteoporosis, CKD 3.  Vitamin D levels have been in optimal range on supplement.  Advised HCTZ be eliminated from her antihypertensive.    Check 24-hour  urine calcium and repeat labs.  Pending results, referral to endocrine surgery may be needed. Pt is agreeable.  -     Calcium, Ionized; Future  -     Calcium, Urine, 24 Hour - Urine, Clean Catch; Future  -     Phosphorus; Future  -     PTH, Intact; Future  -     Renal Function Panel; Future  -     Creatinine Urine 24 hour (kidney function) GFR component - Urine, Clean Catch; Future    2. Primary hypertension  Follow-up with PCP for adjustment in antihypertensive to eliminate HCTZ due to hypercalcemia.    3. Age-related osteoporosis without current pathological fracture  Diagnosed 2018.  BMD 2023 with lowest T-score -2.9 in the left femoral neck.  History of Boniva and Fosamax, not tolerated due to nausea.  She was transition to Prolia in 2024 and tolerating without issue.  This medication will be continued lifelong, no drug holiday as with bisphosphonates due to potential for rapid decline in BMD back to baseline or worse.  Next dose of Prolia due 3/10/2025.  Referral placed.  -     Ambulatory Referral to Infusion Treatment  -     denosumab (PROLIA) syringe 60 mg    4. Multiple thyroid nodules  Ultrasound report reviewed from 2020.  She has bilateral nodules, largest in the left lobe measuring 1.4 cm.  No history of FNA.  Update ultrasound at follow-up visit.       Return in about 4 months (around 3/18/2025) for next scheduled follow up, with thyroid ultrasound ** 30 min appt. The patient was instructed to contact the clinic with any interval questions or concerns.    Electronically signed by: Ebonie Lilly DO   Endocrinologist    Please note that portions of this note were completed with a voice recognition program.

## 2024-11-18 NOTE — TELEPHONE ENCOUNTER
Spoke with patient.  She will stop BP med with HCTZ.  Sent in 2 new prescriptions - olmesartan 40 mg and amlodipine 5 mg.  She will monitor BP and call if greater than 140/90.

## 2024-11-20 ENCOUNTER — TELEPHONE (OUTPATIENT)
Dept: ENDOCRINOLOGY | Facility: CLINIC | Age: 74
End: 2024-11-20
Payer: MEDICARE

## 2024-11-20 NOTE — TELEPHONE ENCOUNTER
Patient called needing to know where to drop her urine specimen off at since the lab they were told to take it to, turned them away. Advised to go to [facility] and I would fax the orders to them. They voiced understanding and orders are being sent via fax.

## 2024-11-23 LAB
CALCIUM 24H UR-MCNC: 1.4 MG/DL
CALCIUM 24H UR-MRATE: 18 MG/24 HR (ref 0–320)
CREAT 24H UR-MRATE: 840 MG/24 HR (ref 800–1800)
CREAT CL 24H UR+SERPL-VRATE: 49 ML/MIN (ref 88–128)
CREAT SERPL-MCNC: 1.19 MG/DL (ref 0.57–1)
CREAT UR-MCNC: 67.2 MG/DL
EGFRCR SERPLBLD CKD-EPI 2021: 48 ML/MIN/1.73

## 2024-11-25 DIAGNOSIS — E83.52 HYPERCALCEMIA: Primary | ICD-10-CM

## 2024-11-25 LAB
ALBUMIN SERPL-MCNC: 4.1 G/DL (ref 3.8–4.8)
BUN SERPL-MCNC: 13 MG/DL (ref 8–27)
BUN/CREAT SERPL: 11 (ref 12–28)
CA-I SERPL ISE-MCNC: 5.4 MG/DL (ref 4.5–5.6)
CALCIUM SERPL-MCNC: 10.3 MG/DL (ref 8.7–10.3)
CHLORIDE SERPL-SCNC: 97 MMOL/L (ref 96–106)
CO2 SERPL-SCNC: 22 MMOL/L (ref 20–29)
CREAT SERPL-MCNC: 1.22 MG/DL (ref 0.57–1)
EGFRCR SERPLBLD CKD-EPI 2021: 46 ML/MIN/1.73
GLUCOSE SERPL-MCNC: 107 MG/DL (ref 70–99)
PHOSPHATE SERPL-MCNC: 2.4 MG/DL (ref 3–4.3)
POTASSIUM SERPL-SCNC: 4.6 MMOL/L (ref 3.5–5.2)
PTH-INTACT SERPL-MCNC: 170 PG/ML (ref 15–65)
SODIUM SERPL-SCNC: 133 MMOL/L (ref 134–144)

## 2024-12-14 DIAGNOSIS — F33.1 MODERATE EPISODE OF RECURRENT MAJOR DEPRESSIVE DISORDER: ICD-10-CM

## 2024-12-14 DIAGNOSIS — F41.9 ANXIETY: ICD-10-CM

## 2024-12-16 RX ORDER — FLUOXETINE 40 MG/1
40 CAPSULE ORAL DAILY
Qty: 90 CAPSULE | Refills: 0 | Status: SHIPPED | OUTPATIENT
Start: 2024-12-16

## 2024-12-19 DIAGNOSIS — F33.1 MODERATE EPISODE OF RECURRENT MAJOR DEPRESSIVE DISORDER: ICD-10-CM

## 2024-12-19 RX ORDER — BUPROPION HYDROCHLORIDE 150 MG/1
150 TABLET ORAL DAILY
Qty: 90 TABLET | Refills: 0 | OUTPATIENT
Start: 2024-12-19

## 2024-12-19 NOTE — TELEPHONE ENCOUNTER
Rx Refill Note  Requested Prescriptions     Pending Prescriptions Disp Refills    buPROPion XL (WELLBUTRIN XL) 150 MG 24 hr tablet [Pharmacy Med Name: BUPROPION XL 150MG TABLETS (24 H)] 90 tablet 0     Sig: TAKE 1 TABLET BY MOUTH DAILY      Last office visit with prescribing clinician: 9/17/2024     Next office visit with prescribing clinician: 1/21/2025     The original prescription was discontinued on 11/14/2024 by Satya Sapp APRN for the following reason: Dose adjustment. Renewing this prescription may not be appropriate.     Jacuqi Conrad MA  12/19/24, 13:06 EST

## 2024-12-30 ENCOUNTER — OFFICE VISIT (OUTPATIENT)
Age: 74
End: 2024-12-30
Payer: MEDICARE

## 2024-12-30 VITALS
HEART RATE: 60 BPM | WEIGHT: 142 LBS | SYSTOLIC BLOOD PRESSURE: 118 MMHG | DIASTOLIC BLOOD PRESSURE: 80 MMHG | HEIGHT: 64 IN | BODY MASS INDEX: 24.24 KG/M2 | OXYGEN SATURATION: 98 %

## 2024-12-30 DIAGNOSIS — R41.840 ATTENTION OR CONCENTRATION DEFICIT: Chronic | ICD-10-CM

## 2024-12-30 DIAGNOSIS — F33.1 MODERATE EPISODE OF RECURRENT MAJOR DEPRESSIVE DISORDER: Primary | ICD-10-CM

## 2024-12-30 DIAGNOSIS — F41.9 ANXIETY: ICD-10-CM

## 2024-12-30 RX ORDER — ATOMOXETINE 40 MG/1
40 CAPSULE ORAL DAILY
Qty: 90 CAPSULE | Refills: 1 | Status: SHIPPED | OUTPATIENT
Start: 2024-12-30

## 2024-12-30 RX ORDER — FLUOXETINE 40 MG/1
40 CAPSULE ORAL DAILY
Qty: 90 CAPSULE | Refills: 0 | Status: SHIPPED | OUTPATIENT
Start: 2024-12-30

## 2024-12-30 RX ORDER — MONTELUKAST SODIUM 10 MG/1
10 TABLET ORAL NIGHTLY
COMMUNITY

## 2024-12-30 RX ORDER — BUPROPION HYDROCHLORIDE 150 MG/1
150 TABLET ORAL EVERY MORNING
Qty: 30 TABLET | Refills: 2 | Status: SHIPPED | OUTPATIENT
Start: 2024-12-30 | End: 2024-12-30

## 2024-12-30 RX ORDER — ATOMOXETINE 40 MG/1
40 CAPSULE ORAL DAILY
Qty: 90 CAPSULE | Refills: 1 | Status: SHIPPED | OUTPATIENT
Start: 2024-12-30 | End: 2024-12-30

## 2024-12-30 RX ORDER — BUPROPION HYDROCHLORIDE 300 MG/1
300 TABLET ORAL EVERY MORNING
Qty: 30 TABLET | Refills: 0 | Status: SHIPPED | OUTPATIENT
Start: 2024-12-30 | End: 2025-12-30

## 2024-12-30 NOTE — PROGRESS NOTES
Follow Up Office Visit      Patient Name: Colleen Morgan  : 1950   MRN: [id number]     Referring Provider: Marsha Kraft PA-C    Chief Complaint:      ICD-10-CM ICD-9-CM   1. Moderate episode of recurrent major depressive disorder  F33.1 296.32   2. Anxiety  F41.9 300.00   3. Attention or concentration deficit  R41.840 799.51        History of Present Illness:   Colleen Morgan is a 74 y.o. female who is here today for follow up and medication management.    Subjective      Patient Reports:   Patient reports difficulty focusing, brain fog, and increased need for sleep.  Patient reports sleeping 15+ hours/day. Patient reports she has been off of the strattera for almost a month. Pateint reports she accidentally forgot she was taking the Strattera until she was filling out the paperwork in the lobby and had difficulty focusing on the questions being asked. Patient reports she is not taking Wellbutrin as prescribed due to forgetfulness.    Patient rates anxiety 7/10. Patient denies panic attacks. Patient denies mood swings, anger outburst and lashing out. Patient denies exaggerated sense of well being or self-confidence. Patient denies decreased need for sleep. Patient denies impulsive and risk-taking behaviors.    Patient denies depressive moments. Patient denies SI/HI/AVH. Patient reports strained relationships with 4 children.    Patient provided consent for  to accompany patient during assessment.  Patient's  reports the patient has been out of Strattera for 2 days.  Patient's  reports the patient was forgetting if she was taking the Wellbutrin in the morning and would end up taking 600mg of Wellbutrin XL.  Patient's  reports patient would be severely confused on these days.  When patient's  realized what was going on patient's  reports he stepped into help patient manage medications and take medications as prescribed. Patient is very  interested in psychotherapy and requests to get started soon with a female therapist.    PHQ-9= 22 increased score from previous visit  BENIGNO-7= 16 increased score from previous visit    Review of Systems:   Review of Systems   Constitutional:  Positive for appetite change.   Eyes:  Negative for visual disturbance.   Respiratory:  Negative for chest tightness and shortness of breath.    Cardiovascular:  Negative for chest pain and palpitations.   Gastrointestinal:  Negative for abdominal pain.   Endocrine: Negative for cold intolerance, heat intolerance, polydipsia and polyuria.   Genitourinary:  Negative for difficulty urinating.   Neurological:  Negative for dizziness, tremors, seizures, weakness, light-headedness and headaches.   Psychiatric/Behavioral:  Positive for confusion, decreased concentration and dysphoric mood. Negative for agitation, hallucinations, self-injury, sleep disturbance and suicidal ideas. The patient is nervous/anxious.    Sleep pattern: 15-16 hours per days  Appetite: decreased    PHQ-9 Depression Screening  Little interest or pleasure in doing things? Not at all   Feeling down, depressed, or hopeless? Almost all   PHQ-2 Total Score 3   Trouble falling or staying asleep, or sleeping too much? Almost all   Feeling tired or having little energy? Almost all   Poor appetite or overeating? Almost all   Feeling bad about yourself - or that you are a failure or have let yourself or your family down? Almost all   Trouble concentrating on things, such as reading the newspaper or watching television? Almost all   Moving or speaking so slowly that other people could have noticed? Or the opposite - being so fidgety or restless that you have been moving around a lot more than usual? Almost all   Thoughts that you would be better off dead, or of hurting yourself in some way? Several days   PHQ-9 Total Score 22   If you checked off any problems, how difficult have these problems made it for you to do your  work, take care of things at home, or get along with other people? Very difficult       BENIGNO-7 Anxiety Screening  Over the last two weeks, how often have you been bothered by the following problems?  Feeling nervous, anxious or on edge: Nearly every day  Not being able to stop or control worrying: Nearly every day  Worrying too much about different things: More than half the days  Trouble Relaxing: More than half the days  Being so restless that it is hard to sit still: More than half the days  Becoming easily annoyed or irritable: Nearly every day  Feeling afraid as if something awful might happen: Several days  BENIGNO 7 Total Score: 16  If you checked any problems, how difficult have these problems made it for you to do your work, take care of things at home, or get along with other people: Extremely difficult    RISK ASSESSMENT:  Patient denies any thoughts or intent of suicide today. Patient denies any impulsive behavior today.     Medications:     Current Outpatient Medications:     amLODIPine (NORVASC) 5 MG tablet, Take 1 tablet by mouth Daily., Disp: 90 tablet, Rfl: 1    atomoxetine (STRATTERA) 40 MG capsule, Take 1 capsule by mouth Daily., Disp: 90 capsule, Rfl: 1    buPROPion XL (Wellbutrin XL) 300 MG 24 hr tablet, Take 1 tablet by mouth Every Morning., Disp: 30 tablet, Rfl: 0    cephalexin (KEFLEX) 250 MG capsule, Take 1 capsule by mouth Every Night., Disp: 90 capsule, Rfl: 1    Cholecalciferol 50 MCG (2000 UT) capsule, Take  by mouth., Disp: , Rfl:     estradiol (ESTRACE) 0.1 MG/GM vaginal cream, Insert 2 g into the vagina 2 (Two) Times a Week., Disp: 42.5 g, Rfl: 2    FLUoxetine (PROzac) 40 MG capsule, Take 1 capsule by mouth Daily., Disp: 90 capsule, Rfl: 0    levocetirizine (XYZAL) 5 MG tablet, Take 1 tablet by mouth Every Evening., Disp: 90 tablet, Rfl: 3    magnesium oxide (MAG-OX) 400 MG tablet, Take 1 tablet by mouth Daily., Disp: 90 tablet, Rfl: 1    nebivolol (BYSTOLIC) 10 MG tablet, Take 1 tablet by  "mouth Daily., Disp: 90 tablet, Rfl: 1    olmesartan (BENICAR) 40 MG tablet, Take 1 tablet by mouth Daily., Disp: 90 tablet, Rfl: 1    pantoprazole (PROTONIX) 40 MG EC tablet, TAKE 1 TABLET DAILY, Disp: 90 tablet, Rfl: 3    rosuvastatin (Crestor) 5 MG tablet, Take 1 tablet by mouth Daily., Disp: 30 tablet, Rfl: 0    montelukast (SINGULAIR) 10 MG tablet, Take 1 tablet by mouth Every Night., Disp: , Rfl:     Current Facility-Administered Medications:     [START ON 3/10/2025] denosumab (PROLIA) syringe 60 mg, 60 mg, Subcutaneous, Once, Ebonie Lilly, DO    Medication Considerations:  MATTHEW reviewed and appropriate.      Allergies:   Allergies   Allergen Reactions    Ace Inhibitors Anaphylaxis and Other (See Comments)     Taken with avelox - throat swelling    Fluticasone Other (See Comments)     \"Increases heart rate, dizziness, felt like something is not right\"    Moxifloxacin Other (See Comments)     When taken with With lisinopril- throat swelling    Pravastatin Myalgia     Stopped per Dr Ernandez due to leg cramps severe       Results Reviewed:   Yes     Objective     Physical Exam:  Vital Signs:   Vitals:    12/30/24 1526   BP: 118/80   Pulse: 60   SpO2: 98%   Weight: 64.4 kg (142 lb)   Height: 162.6 cm (64\")     Body mass index is 24.37 kg/m².     Mental Status Exam:   MENTAL STATUS EXAM   General Appearance:  Cleanly groomed and dressed and well developed  Eye Contact:  Good eye contact  Attitude:  Cooperative  Motor Activity:  Normal gait, posture  Muscle Strength:  Normal  Speech:  Normal rate, tone, volume  Language:  Spontaneous  Mood and affect:  Depressed and anxious  Hopelessness:  5  Loneliness: 2  Thought Process:  Circumstantial  Associations/ Thought Content:  No delusions  Hallucinations:  None  Suicidal Ideations:  Not present  Homicidal Ideation:  Not present  Sensorium:  Alert and clear  Orientation:  Person, place, time and situation  Immediate Recall, Recent, and Remote Memory:  Deficit " noted  Attention Span/ Concentration:  Easily distracted  Fund of Knowledge:  Appropriate for age and educational level  Intellectual Functioning:  Average range  Insight:  Fair  Judgement:  Fair  Reliability:  Fair  Impulse Control:  Fair       @RESULASTCBCDIFFPANEL,TSH,LABLIPI,[id number],[id number],MG,FOLATE,PROLACTIN,CRPRESULT,CMP,[id number])@    Lab Results   Component Value Date    GLUCOSE 107 (H) 11/22/2024    BUN 13 11/22/2024    CREATININE 1.22 (H) 11/22/2024    EGFRRESULT 46 (L) 11/22/2024    EGFR 48.6 (L) 08/21/2024    BCR 11 (L) 11/22/2024    K 4.6 11/22/2024    CO2 22 11/22/2024    CALCIUM 10.3 11/22/2024    PROTENTOTREF 5.2 (L) 07/09/2024    ALBUMIN 4.1 11/22/2024    BILITOT 0.3 07/22/2024    AST 28 07/22/2024    ALT 33 07/22/2024       Lab Results   Component Value Date    WBC 16.25 (H) 07/10/2024    HGB 12.2 07/10/2024    HCT 35.4 07/10/2024    MCV 89.6 07/10/2024     07/10/2024       Lab Results   Component Value Date    CHOL 269 (H) 12/18/2023    TRIG 201 (H) 12/18/2023    HDL 76 (H) 12/18/2023     (H) 12/18/2023       Assessment / Plan      Visit Diagnosis/Orders Placed This Visit:  Diagnoses and all orders for this visit:    1. Moderate episode of recurrent major depressive disorder (Primary)  -     FLUoxetine (PROzac) 40 MG capsule; Take 1 capsule by mouth Daily.  Dispense: 90 capsule; Refill: 0  -     Ambulatory Referral to Behavioral Health    2. Anxiety  -     FLUoxetine (PROzac) 40 MG capsule; Take 1 capsule by mouth Daily.  Dispense: 90 capsule; Refill: 0  -     Ambulatory Referral to Behavioral Health    3. Attention or concentration deficit  -     Discontinue: atomoxetine (STRATTERA) 40 MG capsule; Take 1 capsule by mouth Daily.  Dispense: 90 capsule; Refill: 1  -     atomoxetine (STRATTERA) 40 MG capsule; Take 1 capsule by mouth Daily.  Dispense: 90 capsule; Refill: 1  -     Ambulatory Referral to Behavioral Health    Other orders  -     Discontinue: buPROPion XL  (Wellbutrin XL) 150 MG 24 hr tablet; Take 1 tablet by mouth Every Morning.  Dispense: 30 tablet; Refill: 2  -     Discontinue: FLUoxetine (PROzac) 20 MG capsule; Take 1 capsule by mouth Daily. Then discontinue after 7 days.  Dispense: 7 capsule; Refill: 0  -     buPROPion XL (Wellbutrin XL) 300 MG 24 hr tablet; Take 1 tablet by mouth Every Morning.  Dispense: 30 tablet; Refill: 0       Functional Status: Mild impairment     Prognosis: Good with Ongoing Treatment     Impression/Formulation:  Patient appeared alert and oriented.  Patient is voluntarily requesting to continue outpatient psychiatric treatment at [facility] [address] [facility].  Patient is receptive to assistance with maintaining a stable lifestyle.  Patient presents with history of     ICD-10-CM ICD-9-CM   1. Moderate episode of recurrent major depressive disorder  F33.1 296.32   2. Anxiety  F41.9 300.00   3. Attention or concentration deficit  R41.840 799.51   .    Reviewed patient's previous provider notes. Reviewed most recent labs. Patient meets DSM V diagnostic criteria for diagnoses. Diagnoses may be updated as more information becomes available.       Treatment Plan:   Restart Strattera 40mg. Refilled medication  Continue Wellbutrin 300mg PO qam and Prozac 40mg PO qd. Refilled medications  Referral for psychotherapy placed.  Patient and provider discussed benefits of utilizing psychotherapy in combination with medication management.  Provider educated patient to take medications as prescribed.  Patient verbalized understanding.  Encouraged psychotherapy  Follow-up in 6 weeks and as needed    Patient will continue supportive psychotherapy efforts and medications as indicated. Clinic will obtain release of information for current treatment team for continuity of care as needed. Patient will contact this office, call 911 or present to the nearest emergency room should suicidal or homicidal ideations occur.  Discussed  medication options and treatment plan of prescribed medication(s) as well as the risks, benefits, and potential side effects. Patient acknowledged and verbally consented to continue with current treatment plan and was educated on the importance of compliance with treatment and follow-up appointments.     Pt has no previous history of seizure or current eating disorder, which would be a contraindication for use. The possibility of activation with initiation was discussed and patient verbalizes understanding.     Warned about increased risk of serotonin syndrome associated with use of multiple serotonergic medications.  Patient believes benefits outweigh the risks.  Serotonin syndrome signs and symptoms reviewed such as but not limited to: Diarrhea, shivering, tremors, muscle rigidity, headache and confusion.  Patient agrees to go to ED should any of these occur.       Provider discussed medicinal and nonmedicinal treatment options for treatment of anxiety/depression in patient under age 25, including psychotherapy alone, psychotherapy with SSRI, or SSRI without psychotherapy.  Provider discussed the evidence supporting use of psychotherapy to develop coping skills without the risk of medication side effects in addition to efficacy of combined treatment using therapy and medication. Lengthy conversation regarding the influence of hormone fluctuations on mood symptoms, impulsivity and increased risk of worsening symptoms and suicidality with use of  SSRI medication in adolescence.  Patient/Guardian educated on Black Box Warning of increased suicidal thoughts and behaviors occurring with use of SSRIs in those under age 25 and advised patient must be monitored closely for subtle signs of worsening depression and/or agitation when SSRI medications are initiated. Appropriate safety precautions should be taken including securing firearms and medications out of patient's reach. Provider should be contacted immediately and  patient taken to ED should  SI/HI occur. Provider encouraged patient and/or guardian to weigh risks versus benefits of medication management carefully.    Patient and/or guardian verbalize understanding of risks associated with use of SSRI medication and believe medication is their best treatment option at this time. Guardian and/or patient agrees to monitor for and report worsening symptoms or intolerable side effects and understands patient must return for a two week follow up appointment unless otherwise indicated and agreed upon.       Quality Measures:   Never smoker    I advised Colleen Morgan of the risks of tobacco use.     Follow Up:   Return in about 6 weeks (around 2/10/2025).      DANIEL Tsai  [facility] [address]

## 2024-12-31 ENCOUNTER — OFFICE VISIT (OUTPATIENT)
Dept: FAMILY MEDICINE CLINIC | Facility: CLINIC | Age: 74
End: 2024-12-31
Payer: MEDICARE

## 2024-12-31 VITALS
HEART RATE: 67 BPM | SYSTOLIC BLOOD PRESSURE: 109 MMHG | WEIGHT: 144 LBS | DIASTOLIC BLOOD PRESSURE: 62 MMHG | HEIGHT: 64 IN | BODY MASS INDEX: 24.59 KG/M2 | OXYGEN SATURATION: 96 %

## 2024-12-31 DIAGNOSIS — I10 PRIMARY HYPERTENSION: ICD-10-CM

## 2024-12-31 DIAGNOSIS — B37.9 ANTIBIOTIC-INDUCED YEAST INFECTION: ICD-10-CM

## 2024-12-31 DIAGNOSIS — Z00.00 PHYSICAL EXAM, ANNUAL: ICD-10-CM

## 2024-12-31 DIAGNOSIS — L65.9 HAIR LOSS: ICD-10-CM

## 2024-12-31 DIAGNOSIS — Z12.31 ENCOUNTER FOR SCREENING MAMMOGRAM FOR MALIGNANT NEOPLASM OF BREAST: ICD-10-CM

## 2024-12-31 DIAGNOSIS — E78.2 MIXED HYPERLIPIDEMIA: ICD-10-CM

## 2024-12-31 DIAGNOSIS — J40 BRONCHITIS: ICD-10-CM

## 2024-12-31 DIAGNOSIS — T36.95XA ANTIBIOTIC-INDUCED YEAST INFECTION: ICD-10-CM

## 2024-12-31 DIAGNOSIS — R41.89 COGNITIVE DECLINE: ICD-10-CM

## 2024-12-31 DIAGNOSIS — Z00.00 MEDICARE ANNUAL WELLNESS VISIT, SUBSEQUENT: Primary | ICD-10-CM

## 2024-12-31 PROCEDURE — G0439 PPPS, SUBSEQ VISIT: HCPCS | Performed by: PHYSICIAN ASSISTANT

## 2024-12-31 PROCEDURE — 99397 PER PM REEVAL EST PAT 65+ YR: CPT | Performed by: PHYSICIAN ASSISTANT

## 2024-12-31 PROCEDURE — 3078F DIAST BP <80 MM HG: CPT | Performed by: PHYSICIAN ASSISTANT

## 2024-12-31 PROCEDURE — 1160F RVW MEDS BY RX/DR IN RCRD: CPT | Performed by: PHYSICIAN ASSISTANT

## 2024-12-31 PROCEDURE — 1126F AMNT PAIN NOTED NONE PRSNT: CPT | Performed by: PHYSICIAN ASSISTANT

## 2024-12-31 PROCEDURE — 1159F MED LIST DOCD IN RCRD: CPT | Performed by: PHYSICIAN ASSISTANT

## 2024-12-31 PROCEDURE — 3074F SYST BP LT 130 MM HG: CPT | Performed by: PHYSICIAN ASSISTANT

## 2024-12-31 RX ORDER — FLUCONAZOLE 150 MG/1
TABLET ORAL
Qty: 2 TABLET | Refills: 0 | Status: SHIPPED | OUTPATIENT
Start: 2024-12-31

## 2024-12-31 RX ORDER — CEFDINIR 300 MG/1
300 CAPSULE ORAL 2 TIMES DAILY
Qty: 10 CAPSULE | Refills: 0 | Status: SHIPPED | OUTPATIENT
Start: 2024-12-31 | End: 2025-01-05

## 2024-12-31 NOTE — PROGRESS NOTES
Subjective   The ABCs of the Annual Wellness Visit  Medicare Wellness Visit      Colleen Morgan is a 74 y.o. patient who presents for a Medicare Wellness Visit.    The following portions of the patient's history were reviewed and   updated as appropriate: allergies, current medications, past family history, past medical history, past social history, past surgical history, and problem list.    Compared to one year ago, the patient's physical   health is worse.  Compared to one year ago, the patient's mental   health is worse.    Recent Hospitalizations:  She was not admitted to the hospital during the last year.     Current Medical Providers:  Patient Care Team:  Marsha Kraft PA-C as PCP - General (Physician Assistant)  Ebonie Lilly DO as Consulting Physician (Endocrinology)  Milagro Craven APRN as Nurse Practitioner (Urology)  Satya Sapp APRN as Nurse Practitioner (Behavioral Health)    Outpatient Medications Prior to Visit   Medication Sig Dispense Refill    amLODIPine (NORVASC) 5 MG tablet Take 1 tablet by mouth Daily. 90 tablet 1    atomoxetine (STRATTERA) 40 MG capsule Take 1 capsule by mouth Daily. 90 capsule 1    buPROPion XL (Wellbutrin XL) 300 MG 24 hr tablet Take 1 tablet by mouth Every Morning. 30 tablet 0    cephalexin (KEFLEX) 250 MG capsule Take 1 capsule by mouth Every Night. 90 capsule 1    Cholecalciferol 50 MCG (2000 UT) capsule Take  by mouth.      estradiol (ESTRACE) 0.1 MG/GM vaginal cream Insert 2 g into the vagina 2 (Two) Times a Week. 42.5 g 2    FLUoxetine (PROzac) 40 MG capsule Take 1 capsule by mouth Daily. 90 capsule 0    levocetirizine (XYZAL) 5 MG tablet Take 1 tablet by mouth Every Evening. 90 tablet 3    magnesium oxide (MAG-OX) 400 MG tablet Take 1 tablet by mouth Daily. 90 tablet 1    montelukast (SINGULAIR) 10 MG tablet Take 1 tablet by mouth Every Night.      nebivolol (BYSTOLIC) 10 MG tablet Take 1 tablet by mouth Daily. 90 tablet 1    olmesartan  "(BENICAR) 40 MG tablet Take 1 tablet by mouth Daily. 90 tablet 1    pantoprazole (PROTONIX) 40 MG EC tablet TAKE 1 TABLET DAILY 90 tablet 3    rosuvastatin (Crestor) 5 MG tablet Take 1 tablet by mouth Daily. 30 tablet 0     Facility-Administered Medications Prior to Visit   Medication Dose Route Frequency Provider Last Rate Last Admin    [START ON 3/10/2025] denosumab (PROLIA) syringe 60 mg  60 mg Subcutaneous Once Ebonie Lilly,          No opioid medication identified on active medication list. I have reviewed chart for other potential  high risk medication/s and harmful drug interactions in the elderly.      Aspirin is not on active medication list.  Aspirin use is not indicated based on review of current medical condition/s. Risk of harm outweighs potential benefits.  .    Patient Active Problem List   Diagnosis    Moderate aortic valve regurgitation    CAD (coronary artery disease), native coronary artery    Moderate episode of recurrent major depressive disorder    Injury of right rotator cuff    Personal history of fall    Seasonal allergies    Age-related osteoporosis without current pathological fracture    Primary hypertension    Dry eye syndrome of both eyes    ADD (attention deficit disorder)    Allergic rhinitis    Hyperparathyroidism    Migraine    Mixed hyperlipidemia    S/P hysterectomy    Status post right rotator cuff repair    Nontraumatic complete tear of right rotator cuff    Tendinopathy of right biceps tendon    Primary osteoarthritis of right shoulder    Impingement of left shoulder    Duodenal ulcer disease     Advance Care Planning Advance Directive is not on file.  ACP discussion was declined by the patient. Patient has an advance directive (not in EMR), copy requested.            Objective   Vitals:    12/31/24 1358   BP: 109/62   Pulse: 67   SpO2: 96%   Weight: 65.3 kg (144 lb)   Height: 162.6 cm (64.02\")       Estimated body mass index is 24.71 kg/m² as calculated from the " "following:    Height as of this encounter: 162.6 cm (64.02\").    Weight as of this encounter: 65.3 kg (144 lb).    BMI is within normal parameters. No other follow-up for BMI required.  Gait and Balance Evaluation:  Normal         Does the patient have evidence of cognitive impairment? No                                                                                                Health  Risk Assessment    Smoking Status:  Social History     Tobacco Use   Smoking Status Never    Passive exposure: Past   Smokeless Tobacco Never   Tobacco Comments    Tried a few times but quit     Alcohol Consumption:  Social History     Substance and Sexual Activity   Alcohol Use Yes    Alcohol/week: 1.0 - 2.0 standard drink of alcohol    Types: 1 - 2 Glasses of wine per week    Comment: With dinner       Fall Risk Screen  STEADI Fall Risk Assessment has not been completed.    Depression Screening   Little interest or pleasure in doing things? Not at all   Feeling down, depressed, or hopeless? Not at all   PHQ-2 Total Score 0      Health Habits and Functional and Cognitive Screenin/31/2024     2:13 PM   Functional & Cognitive Status   Do you have difficulty preparing food and eating? No   Do you have difficulty bathing yourself, getting dressed or grooming yourself? No   Do you have difficulty using the toilet? No   Do you have difficulty moving around from place to place? No   Do you have trouble with steps or getting out of a bed or a chair? No   Current Diet Unhealthy Diet   Dental Exam Up to date   Eye Exam Up to date   Exercise (times per week) 0 times per week   Current Exercises Include No Regular Exercise   Do you need help using the phone?  No   Are you deaf or do you have serious difficulty hearing?  Yes   Do you need help to go to places out of walking distance? No   Do you need help shopping? Yes   Do you need help preparing meals?  Yes   Do you need help with housework?  Yes   Do you need help with laundry? " Yes   Do you need help taking your medications? No   Do you need help managing money? No   Do you ever drive or ride in a car without wearing a seat belt? No   Have you felt unusual stress, anger or loneliness in the last month? Yes   Who do you live with? Spouse   If you need help, do you have trouble finding someone available to you? No   Have you been bothered in the last four weeks by sexual problems? No   Do you have difficulty concentrating, remembering or making decisions? Yes           Age-appropriate Screening Schedule:  Refer to the list below for future screening recommendations based on patient's age, sex and/or medical conditions. Orders for these recommended tests are listed in the plan section. The patient has been provided with a written plan.    Health Maintenance List  Health Maintenance   Topic Date Due    ZOSTER VACCINE (3 of 3) 08/17/2023    MAMMOGRAM  09/14/2024    RSV Vaccine - Adults (1 - 1-dose 75+ series) Never done    LIPID PANEL  12/18/2024    COVID-19 Vaccine (14 - 2024-25 season) 01/02/2025 (Originally 9/1/2024)    DXA SCAN  06/22/2025    ANNUAL WELLNESS VISIT  12/31/2025    TDAP/TD VACCINES (3 - Td or Tdap) 03/06/2029    COLORECTAL CANCER SCREENING  06/16/2031    HEPATITIS C SCREENING  Completed    INFLUENZA VACCINE  Completed    Pneumococcal Vaccine 65+  Completed                                                                                                                                                CMS Preventative Services Quick Reference  Risk Factors Identified During Encounter  Immunizations Discussed/Encouraged: Shingrix and RSV (Respiratory Syncytial Virus)  Dental Screening Recommended  Vision Screening Recommended    The above risks/problems have been discussed with the patient.  Pertinent information has been shared with the patient in the After Visit Summary.  An After Visit Summary and PPPS were made available to the patient.    Follow Up:   Next Medicare Wellness  "visit to be scheduled in 1 year.         Additional E&M Note during same encounter follows:  Patient has additional, significant, and separately identifiable condition(s)/problem(s) that require work above and beyond the Medicare Wellness Visit     Chief Complaint  Medicare Wellness-subsequent and Annual Exam    Subjective   HPI  Colleen is also being seen today for an annual adult preventative physical exam.     Review of Systems   Constitutional:  Positive for fatigue. Negative for chills and fever.   HENT:  Positive for congestion. Negative for rhinorrhea.    Respiratory:  Positive for cough. Negative for shortness of breath and wheezing.    Cardiovascular:  Negative for chest pain, palpitations and leg swelling.   Genitourinary:  Negative for dysuria and flank pain.   Neurological:  Positive for confusion. Negative for dizziness.              Objective   Vital Signs:  /62   Pulse 67   Ht 162.6 cm (64.02\")   Wt 65.3 kg (144 lb)   SpO2 96%   BMI 24.71 kg/m²   Physical Exam  Vitals reviewed.   Constitutional:       General: She is not in acute distress.     Appearance: Normal appearance. She is well-developed and normal weight. She is not ill-appearing or diaphoretic.   HENT:      Head: Normocephalic and atraumatic.   Eyes:      Extraocular Movements: Extraocular movements intact.      Conjunctiva/sclera: Conjunctivae normal.   Pulmonary:      Effort: No respiratory distress.   Musculoskeletal:         General: Normal range of motion.      Cervical back: Normal range of motion.   Neurological:      General: No focal deficit present.      Mental Status: She is alert. She is confused.   Psychiatric:         Attention and Perception: She is attentive.         Mood and Affect: Mood normal.         Speech: Speech normal.         Behavior: Behavior normal. Behavior is cooperative.         Thought Content: Thought content normal.         Judgment: Judgment normal.         The following data was reviewed by: " Marsha Kraft PA-C on 12/31/2024:  Data reviewed : endocrinology notes  CMP          7/22/2024    14:14 8/21/2024    14:17 11/22/2024    11:00 11/22/2024    13:23   CMP   Glucose 96  88   107    BUN 25  19   13    Creatinine 1.32  1.18  1.19  1.22    EGFR 42.5  48.6      Sodium 135  132   133    Potassium 5.1  4.3   4.6    Chloride 98  95   97    Calcium 11.7  11.2   10.3    Total Protein 7.1       Albumin 4.0    4.1    Globulin 3.1       Total Bilirubin 0.3       Alkaline Phosphatase 102       AST (SGOT) 28       ALT (SGPT) 33       Albumin/Globulin Ratio 1.3       BUN/Creatinine Ratio 18.9  16.1   11    Anion Gap 12.0  12.0        CBC          7/10/2024    15:14   CBC   WBC 16.25    RBC 3.95    Hemoglobin 12.2    Hematocrit 35.4    MCV 89.6    MCH 30.9    MCHC 34.5    RDW 12.9    Platelets 389      TSH          7/10/2024    15:14   TSH   TSH 1.600              Assessment and Plan Additional age appropriate preventative wellness advice topics were discussed during today's preventative wellness exam(some topics already addressed during AWV portion of the note above):    Physical Activity: Advised cardiovascular activity 150 minutes per week as tolerated. (example brisk walk for 30 minutes, 5 days a week).     Nutrition: Discussed nutrition plan with patient. Information shared in after visit summary. Goal is for a well balanced diet to enhance overall health.           Medicare annual wellness visit, subsequent         Physical exam, annual  PE completed  Preventative labs ordered  Colonoscopy is up-to-date  Mammogram - referral placed  Dentist - encouraged to go regularly  Ophthalmologist - encouraged to go regularly  Dermatologist - denies any moles or lesions of concern  Vaccinations discussed         Cognitive decline  Has confusion around medications and dates.  States she is sleeping for 15+ hours a day.  Referred to behavioral health.  Will request that neurology evaluate her.    Orders:     Ambulatory Referral to Neurology    Vitamin B12 & Folate; Future    Primary hypertension  Blood pressure is low today.  Patient brings in medications and may have taken the wrong one.  Clarified medications for patient to be on at this time.  Encouraged her to hydrate well with water and monitor BP at home.    Orders:    CBC Auto Differential; Future    Mixed hyperlipidemia  On rosuvastatin 5 mg nightly.    Orders:    Lipid Panel; Future    Bronchitis  New.  Started about 5 days ago.  Given that it was the holidays, she took her normal daily Keflex 250 mg twice daily.  She feels this may have helped but still has productive cough.  Hold keflex.  Start Cefdinir 300 mg twice daily.  Resume keflex when she has finished her cefdinir course.  Go back to once daily.    Orders:    cefdinir (OMNICEF) 300 MG capsule; Take 1 capsule by mouth 2 (Two) Times a Day for 5 days.    Encounter for screening mammogram for malignant neoplasm of breast    Orders:    Mammo Screening Digital Tomosynthesis Bilateral With CAD; Future    Hair loss    Orders:    Iron Profile; Future    Antibiotic-induced yeast infection    Orders:    fluconazole (Diflucan) 150 MG tablet; Take 1 tablet daily, repeat in 3 days if symptoms persist           I spent 35 minutes caring for Colleen on this date of service. This time includes time spent by me in the following activities:preparing for the visit, reviewing tests, obtaining and/or reviewing a separately obtained history, performing a medically appropriate examination and/or evaluation , counseling and educating the patient/family/caregiver, ordering medications, tests, or procedures, referring and communicating with other health care professionals , documenting information in the medical record, independently interpreting results and communicating that information with the patient/family/caregiver, and care coordination  Follow Up   Return in about 2 weeks (around 1/14/2025) for Recheck, HTN.  Patient was  given instructions and counseling regarding her condition or for health maintenance advice. Please see specific information pulled into the AVS if appropriate.

## 2024-12-31 NOTE — ASSESSMENT & PLAN NOTE
Blood pressure is low today.  Patient brings in medications and may have taken the wrong one.  Clarified medications for patient to be on at this time.  Encouraged her to hydrate well with water and monitor BP at home.    Orders:    CBC Auto Differential; Future

## 2025-01-13 DIAGNOSIS — Z12.31 ENCOUNTER FOR SCREENING MAMMOGRAM FOR MALIGNANT NEOPLASM OF BREAST: Primary | ICD-10-CM

## 2025-01-13 DIAGNOSIS — R92.8 ABNORMAL MAMMOGRAM: ICD-10-CM

## 2025-01-14 ENCOUNTER — OFFICE VISIT (OUTPATIENT)
Dept: FAMILY MEDICINE CLINIC | Facility: CLINIC | Age: 75
End: 2025-01-14
Payer: MEDICARE

## 2025-01-14 VITALS
OXYGEN SATURATION: 98 % | BODY MASS INDEX: 24.45 KG/M2 | HEIGHT: 64 IN | WEIGHT: 143.2 LBS | HEART RATE: 68 BPM | DIASTOLIC BLOOD PRESSURE: 66 MMHG | SYSTOLIC BLOOD PRESSURE: 122 MMHG

## 2025-01-14 DIAGNOSIS — I10 PRIMARY HYPERTENSION: Primary | ICD-10-CM

## 2025-01-14 DIAGNOSIS — R41.89 COGNITIVE IMPAIRMENT: ICD-10-CM

## 2025-01-14 DIAGNOSIS — R26.81 GENERALLY UNSTEADY: ICD-10-CM

## 2025-01-14 PROCEDURE — 1160F RVW MEDS BY RX/DR IN RCRD: CPT | Performed by: PHYSICIAN ASSISTANT

## 2025-01-14 PROCEDURE — 3078F DIAST BP <80 MM HG: CPT | Performed by: PHYSICIAN ASSISTANT

## 2025-01-14 PROCEDURE — 1159F MED LIST DOCD IN RCRD: CPT | Performed by: PHYSICIAN ASSISTANT

## 2025-01-14 PROCEDURE — 99214 OFFICE O/P EST MOD 30 MIN: CPT | Performed by: PHYSICIAN ASSISTANT

## 2025-01-14 PROCEDURE — 1126F AMNT PAIN NOTED NONE PRSNT: CPT | Performed by: PHYSICIAN ASSISTANT

## 2025-01-14 PROCEDURE — G2211 COMPLEX E/M VISIT ADD ON: HCPCS | Performed by: PHYSICIAN ASSISTANT

## 2025-01-14 PROCEDURE — 3074F SYST BP LT 130 MM HG: CPT | Performed by: PHYSICIAN ASSISTANT

## 2025-01-14 RX ORDER — CYCLOSPORINE 0.5 MG/ML
1 EMULSION OPHTHALMIC EVERY 12 HOURS
COMMUNITY
Start: 2024-08-28

## 2025-01-15 NOTE — PROGRESS NOTES
Chief Complaint   Patient presents with    Hypertension     Pt. States she would like to discuss medications.       Colleen Morgan is a pleasant 74 y.o. female who is here for routine follow-up of hypertension.  Patient is not sure what medications she is currently on.  She does not have her medications with her.  She reports feeling unsteady worse with standing and in the mornings.  Her blood pressure on repeat and orthostatics is normal.  She thinks her symptoms are related to her medicine.  No family present today.    Past Medical History:   Diagnosis Date    ADHD (attention deficit hyperactivity disorder)     Allergic     Arthritis     Arthritis of back 2000    Arthritis of neck ?    Asthma     Bursitis of hip 2008    Colon polyp     Coronary artery disease     Depression     GERD (gastroesophageal reflux disease)     Headache     Heart murmur     Hip arthrosis     HL (hearing loss)     Hyperparathyroidism 2019    Noted by my cardiologist    Hypertension     Irritable bowel syndrome     Low back pain     Low back strain 2010    Neck strain 2001    Osteopenia     Osteoporosis     Periarthritis of shoulder 2024    Found in MRI of rotator cuff    Rotator cuff syndrome     Tennis elbow 2000    Urinary tract infection     Visual impairment     Vitamin D deficiency     Wrist sprain 2005    Resulted from falling       Past Surgical History:   Procedure Laterality Date    ADENOIDECTOMY  1956    With tonsilectomy    APPENDECTOMY  1968    CARDIAC CATHETERIZATION      CATARACT EXTRACTION Bilateral 2012    COLONOSCOPY  6/2:    Colonoscopy and upper GI    HYSTERECTOMY  1996    SHOULDER ARTHROSCOPY Right 02/19/2024    arthroscopic rotator cuff repair, arthroscopic biceps tenodesis, arthroscopic extensive debridement, arthroscopic subacromial decompression with acromioplasty, Dr. Regan Vann    SHOULDER SURGERY  02/24    Right Rotator cuff, bicep trars    SINUS SURGERY      x 2    TONSILLECTOMY AND ADENOIDECTOMY       "TUBAL ABDOMINAL LIGATION  1979       Family History   Problem Relation Age of Onset    Diabetes Mother     Arthritis Mother     Hypertension Mother     Osteoarthritis Mother     Hearing loss Mother     Osteoporosis Mother     Heart failure Father     Bipolar disorder Father     Alcohol abuse Father     Depression Father     Hearing loss Father     Hyperlipidemia Father     Anxiety disorder Daughter     Depression Daughter     Anxiety disorder Daughter     Depression Daughter     Developmental Disability Daughter     Learning disabilities Daughter     Hypertension Brother     Thyroid disease Brother     Anesthesia problems Brother     Clotting disorder Brother     Diabetes Brother        Social History     Socioeconomic History    Marital status:    Tobacco Use    Smoking status: Never     Passive exposure: Past    Smokeless tobacco: Never    Tobacco comments:     Tried a few times but quit   Vaping Use    Vaping status: Never Used   Substance and Sexual Activity    Alcohol use: Yes     Alcohol/week: 1.0 - 2.0 standard drink of alcohol     Types: 1 - 2 Glasses of wine per week     Comment: With dinner    Drug use: Never    Sexual activity: Yes     Partners: Male     Birth control/protection: Post-menopausal, Tubal ligation, Hysterectomy       Allergies   Allergen Reactions    Ace Inhibitors Anaphylaxis and Other (See Comments)     Taken with avelox - throat swelling    Fluticasone Other (See Comments)     \"Increases heart rate, dizziness, felt like something is not right\"    Moxifloxacin Other (See Comments)     When taken with With lisinopril- throat swelling    Pravastatin Myalgia     Stopped per Dr Ernandez due to leg cramps severe       ROS  Review of Systems   Constitutional:  Positive for fatigue. Negative for chills, diaphoresis and fever.   HENT:  Negative for congestion, postnasal drip and rhinorrhea.    Respiratory:  Negative for cough, shortness of breath and wheezing.    Cardiovascular:  Negative for " "chest pain and leg swelling.   Musculoskeletal:  Positive for gait problem.   Neurological:  Negative for dizziness and headache.   Psychiatric/Behavioral:  Negative for self-injury, suicidal ideas and depressed mood. The patient is not nervous/anxious.        Vitals:    01/14/25 1506 01/14/25 1558 01/14/25 1600 01/14/25 1601   BP: 126/82 124/72 122/66    BP Location:  Left arm Left arm Left arm   Patient Position:  Standing Sitting Lying   Cuff Size:    Adult   Pulse: 68      SpO2: 98%      Weight: 65 kg (143 lb 3.2 oz)      Height: 162.6 cm (64.02\")      PainSc: 0-No pain        Body mass index is 24.57 kg/m².    BMI is within normal parameters. No other follow-up for BMI required.      Current Outpatient Medications on File Prior to Visit   Medication Sig Dispense Refill    amLODIPine (NORVASC) 5 MG tablet Take 1 tablet by mouth Daily. 90 tablet 1    atomoxetine (STRATTERA) 40 MG capsule Take 1 capsule by mouth Daily. 90 capsule 1    buPROPion XL (Wellbutrin XL) 300 MG 24 hr tablet Take 1 tablet by mouth Every Morning. 30 tablet 0    cephalexin (KEFLEX) 250 MG capsule Take 1 capsule by mouth Every Night. 90 capsule 1    Cholecalciferol 50 MCG (2000 UT) capsule Take  by mouth.      cycloSPORINE (Restasis) 0.05 % ophthalmic emulsion Apply 1 drop to eye(s) as directed by provider Every 12 (Twelve) Hours.      estradiol (ESTRACE) 0.1 MG/GM vaginal cream Insert 2 g into the vagina 2 (Two) Times a Week. 42.5 g 2    FLUoxetine (PROzac) 40 MG capsule Take 1 capsule by mouth Daily. 90 capsule 0    levocetirizine (XYZAL) 5 MG tablet Take 1 tablet by mouth Every Evening. 90 tablet 3    magnesium oxide (MAG-OX) 400 MG tablet Take 1 tablet by mouth Daily. 90 tablet 1    montelukast (SINGULAIR) 10 MG tablet Take 1 tablet by mouth Every Night.      nebivolol (BYSTOLIC) 10 MG tablet Take 1 tablet by mouth Daily. 90 tablet 1    olmesartan (BENICAR) 40 MG tablet Take 1 tablet by mouth Daily. 90 tablet 1    pantoprazole (PROTONIX) " 40 MG EC tablet TAKE 1 TABLET DAILY 90 tablet 3    rosuvastatin (Crestor) 5 MG tablet Take 1 tablet by mouth Daily. 30 tablet 0    [DISCONTINUED] fluconazole (Diflucan) 150 MG tablet Take 1 tablet daily, repeat in 3 days if symptoms persist 2 tablet 0     Current Facility-Administered Medications on File Prior to Visit   Medication Dose Route Frequency Provider Last Rate Last Admin    [START ON 3/10/2025] denosumab (PROLIA) syringe 60 mg  60 mg Subcutaneous Once Ebonie Lilly,            Results for orders placed or performed in visit on 11/22/24   Renal Function Panel    Collection Time: 11/22/24  1:23 PM   Result Value Ref Range    Glucose 107 (H) 70 - 99 mg/dL    BUN 13 8 - 27 mg/dL    Creatinine 1.22 (H) 0.57 - 1.00 mg/dL    EGFR Result 46 (L) >59 mL/min/1.73    BUN/Creatinine Ratio 11 (L) 12 - 28    Sodium 133 (L) 134 - 144 mmol/L    Potassium 4.6 3.5 - 5.2 mmol/L    Chloride 97 96 - 106 mmol/L    Total CO2 22 20 - 29 mmol/L    Calcium 10.3 8.7 - 10.3 mg/dL    Phosphorus 2.4 (L) 3.0 - 4.3 mg/dL    Albumin 4.1 3.8 - 4.8 g/dL   Calcium, Ionized    Collection Time: 11/22/24  1:23 PM   Result Value Ref Range    Ionized Calcium 5.4 4.5 - 5.6 mg/dL   PTH, Intact    Collection Time: 11/22/24  1:23 PM   Result Value Ref Range    PTH, Intact 170 (H) 15 - 65 pg/mL       PE    Physical Exam  Vitals reviewed.   Constitutional:       General: She is not in acute distress.     Appearance: Normal appearance. She is well-developed. She is not ill-appearing or diaphoretic.   HENT:      Head: Normocephalic and atraumatic.   Eyes:      Extraocular Movements: Extraocular movements intact.      Conjunctiva/sclera: Conjunctivae normal.   Pulmonary:      Effort: No respiratory distress.   Musculoskeletal:         General: Normal range of motion.      Cervical back: Normal range of motion.   Neurological:      General: No focal deficit present.      Mental Status: She is alert.   Psychiatric:         Attention and Perception:  Attention normal. She is attentive.         Mood and Affect: Mood normal.         Speech: Speech normal.         Behavior: Behavior normal. Behavior is cooperative.         Thought Content: Thought content normal.         Cognition and Memory: Cognition is impaired. Memory is impaired.         Judgment: Judgment normal.           A/P    Diagnoses and all orders for this visit:    1. Primary hypertension (Primary)  Orthostatic and repeat BP is normal today.  Patient has concern about feeling unsteady on her feet.  She is unsure what medication she is taking.  Gave her a printed list and asked her to bring in her medication to next visit.  Monitor BP at home and call if less than 120/60.    2. Generally unsteady  3. Cognitive impairment  -will order MRI brain  -referral to neurology has already been place, appointment in April  Patient seems more confused today.  Unclear what medication she is taking.  Having difficulty describing her symptoms.  States she feels unsteady and not like herself.  Reviewed recent labs.  Recommend she bring family to next visit.    Plan of care reviewed with patient at the conclusion of today's visit. Education was provided regarding diagnosis, management and any prescribed or recommended OTC medications.  Patient verbalizes understanding of and agreement with management plan.    Dictated Utilizing Dragon Dictation     Please note that portions of this note were completed with a voice recognition program.     Part of this note may be an electronic transcription/translation of spoken language to printed text using the Dragon Dictation System.    Return in about 27 days (around 2/10/2025) for Recheck, HTN (okay to work in).     Marsha Kraft PA-C

## 2025-01-24 DIAGNOSIS — J30.2 SEASONAL ALLERGIES: ICD-10-CM

## 2025-01-24 DIAGNOSIS — I10 PRIMARY HYPERTENSION: ICD-10-CM

## 2025-01-24 DIAGNOSIS — R41.840 ATTENTION OR CONCENTRATION DEFICIT: Chronic | ICD-10-CM

## 2025-01-24 DIAGNOSIS — N39.0 RECURRENT UTI: ICD-10-CM

## 2025-01-24 DIAGNOSIS — F41.9 ANXIETY: ICD-10-CM

## 2025-01-24 DIAGNOSIS — J40 BRONCHITIS: ICD-10-CM

## 2025-01-24 DIAGNOSIS — F33.1 MODERATE EPISODE OF RECURRENT MAJOR DEPRESSIVE DISORDER: ICD-10-CM

## 2025-01-27 ENCOUNTER — TELEPHONE (OUTPATIENT)
Dept: FAMILY MEDICINE CLINIC | Facility: CLINIC | Age: 75
End: 2025-01-27
Payer: MEDICARE

## 2025-01-27 RX ORDER — FLUOXETINE 40 MG/1
40 CAPSULE ORAL DAILY
Qty: 90 CAPSULE | Refills: 0 | Status: SHIPPED | OUTPATIENT
Start: 2025-01-27

## 2025-01-27 RX ORDER — LEVOCETIRIZINE DIHYDROCHLORIDE 5 MG/1
5 TABLET, FILM COATED ORAL EVERY EVENING
Qty: 90 TABLET | Refills: 3 | Status: SHIPPED | OUTPATIENT
Start: 2025-01-27

## 2025-01-27 RX ORDER — ATOMOXETINE 40 MG/1
40 CAPSULE ORAL DAILY
Qty: 90 CAPSULE | Refills: 1 | Status: SHIPPED | OUTPATIENT
Start: 2025-01-27

## 2025-01-27 RX ORDER — BUPROPION HYDROCHLORIDE 300 MG/1
300 TABLET ORAL EVERY MORNING
Qty: 30 TABLET | Refills: 1 | Status: CANCELLED | OUTPATIENT
Start: 2025-01-27 | End: 2026-01-27

## 2025-01-27 RX ORDER — PANTOPRAZOLE SODIUM 40 MG/1
40 TABLET, DELAYED RELEASE ORAL DAILY
Qty: 90 TABLET | Refills: 3 | Status: SHIPPED | OUTPATIENT
Start: 2025-01-27

## 2025-01-27 RX ORDER — CEFDINIR 300 MG/1
300 CAPSULE ORAL 2 TIMES DAILY
Qty: 10 CAPSULE | Refills: 0 | Status: SHIPPED | OUTPATIENT
Start: 2025-01-27 | End: 2025-01-29

## 2025-01-27 RX ORDER — MONTELUKAST SODIUM 10 MG/1
10 TABLET ORAL NIGHTLY
Qty: 30 TABLET | Refills: 1 | Status: SHIPPED | OUTPATIENT
Start: 2025-01-27

## 2025-01-27 RX ORDER — NEBIVOLOL 10 MG/1
10 TABLET ORAL DAILY
Qty: 90 TABLET | Refills: 1 | Status: SHIPPED | OUTPATIENT
Start: 2025-01-27

## 2025-01-27 RX ORDER — OLMESARTAN MEDOXOMIL 40 MG/1
40 TABLET ORAL DAILY
Qty: 90 TABLET | Refills: 1 | Status: SHIPPED | OUTPATIENT
Start: 2025-01-27

## 2025-01-27 RX ORDER — AMLODIPINE BESYLATE 5 MG/1
5 TABLET ORAL DAILY
Qty: 90 TABLET | Refills: 1 | Status: SHIPPED | OUTPATIENT
Start: 2025-01-27

## 2025-01-27 RX ORDER — BUPROPION HYDROCHLORIDE 300 MG/1
300 TABLET ORAL EVERY MORNING
Qty: 30 TABLET | Refills: 1 | Status: SHIPPED | OUTPATIENT
Start: 2025-01-27 | End: 2026-01-27

## 2025-01-27 NOTE — TELEPHONE ENCOUNTER
Rx Refill Note  Requested Prescriptions     Pending Prescriptions Disp Refills    cephalexin (KEFLEX) 250 MG capsule 90 capsule 1     Sig: Take 1 capsule by mouth Every Night.      Last office visit with prescribing clinician: 9/17/2024   Last telemedicine visit with prescribing clinician: Visit date not found   Next office visit with prescribing clinician: 5/6/2025     Gustavo Wall MA  01/27/25, 08:25 EST

## 2025-01-27 NOTE — TELEPHONE ENCOUNTER
Pt states she is experiencing some side effects, she thinks, to the medication changes.. feeling dizzy, clamy/sweating, nauseated and feels like she may pass out. Fior suggested going to the ED or [facility] - pt did not want to do either and stated she will check her BP & call me back if it was abnormal

## 2025-01-27 NOTE — TELEPHONE ENCOUNTER
Caller: Cathy Colleen Jigar    Relationship: Self    Best call back number: [phone]     Requested Prescriptions:   Requested Prescriptions     Pending Prescriptions Disp Refills    buPROPion XL (Wellbutrin XL) 300 MG 24 hr tablet 30 tablet 1     Sig: Take 1 tablet by mouth Every Morning.        Pharmacy where request should be sent: [id number] - [place], [place] - [address] - [phone] [facility] [phone] FX     Last office visit with prescribing clinician: 1/14/2025   Last telemedicine visit with prescribing clinician: Visit date not found   Next office visit with prescribing clinician: 2/10/2025     Additional details provided by patient: PATIENT IS OUT OF MEDICATION - PLEASE SEND SOME TO THE LOCAL PHARMACY     Does the patient have less than a 3 day supply:  [x] Yes    Would you like a call back once the refill request has been completed: [] Yes [x] No    If the office needs to give you a call back, can they leave a voicemail: [] Yes [x] No    Beryl Norwood MA   01/27/25 15:59 EST

## 2025-01-27 NOTE — TELEPHONE ENCOUNTER
Rx Refill Note  Requested Prescriptions     Pending Prescriptions Disp Refills    nebivolol (BYSTOLIC) 10 MG tablet 90 tablet 1     Sig: Take 1 tablet by mouth Daily.    pantoprazole (PROTONIX) 40 MG EC tablet 90 tablet 3     Sig: Take 1 tablet by mouth Daily.    levocetirizine (XYZAL) 5 MG tablet 90 tablet 3     Sig: Take 1 tablet by mouth Every Evening.    olmesartan (BENICAR) 40 MG tablet 90 tablet 1     Sig: Take 1 tablet by mouth Daily.    amLODIPine (NORVASC) 5 MG tablet 90 tablet 1     Sig: Take 1 tablet by mouth Daily.    montelukast (SINGULAIR) 10 MG tablet       Sig: Take 1 tablet by mouth Every Night.    cefdinir (OMNICEF) 300 MG capsule 10 capsule 0     Sig: Take 1 capsule by mouth 2 (Two) Times a Day for 5 days.      Last office visit with prescribing clinician: 1/14/2025     Next office visit with prescribing clinician: 2/10/2025       Jacqui Conrad MA  01/27/25, 13:11 EST

## 2025-01-29 ENCOUNTER — TELEPHONE (OUTPATIENT)
Age: 75
End: 2025-01-29
Payer: MEDICARE

## 2025-01-29 DIAGNOSIS — N39.0 RECURRENT UTI: Primary | ICD-10-CM

## 2025-01-29 NOTE — TELEPHONE ENCOUNTER
"Relay     \" Received the follow message from DANIEL Miranda - Can we please move patient up to see me in the next 4-6 weeks with BMP prior please? Has appt with me in May but need to see her sooner.     LVM for PT to return call to office to schedule follow up with Milagro around 03/04/25\"                 "

## 2025-01-31 NOTE — TELEPHONE ENCOUNTER
Called and LVM x3 for pt to move up appt with Milagro Craven. Hub ok to transfer into office to move up her follow up

## 2025-02-10 ENCOUNTER — OFFICE VISIT (OUTPATIENT)
Dept: FAMILY MEDICINE CLINIC | Facility: CLINIC | Age: 75
End: 2025-02-10
Payer: MEDICARE

## 2025-02-10 ENCOUNTER — OFFICE VISIT (OUTPATIENT)
Age: 75
End: 2025-02-10
Payer: MEDICARE

## 2025-02-10 VITALS
DIASTOLIC BLOOD PRESSURE: 70 MMHG | HEART RATE: 70 BPM | WEIGHT: 145.7 LBS | BODY MASS INDEX: 24.87 KG/M2 | OXYGEN SATURATION: 98 % | HEIGHT: 64 IN | SYSTOLIC BLOOD PRESSURE: 130 MMHG

## 2025-02-10 VITALS
SYSTOLIC BLOOD PRESSURE: 122 MMHG | BODY MASS INDEX: 24.75 KG/M2 | WEIGHT: 145 LBS | OXYGEN SATURATION: 97 % | HEIGHT: 64 IN | HEART RATE: 67 BPM | DIASTOLIC BLOOD PRESSURE: 62 MMHG

## 2025-02-10 DIAGNOSIS — R42 DIZZINESS: Primary | ICD-10-CM

## 2025-02-10 DIAGNOSIS — F33.1 MODERATE EPISODE OF RECURRENT MAJOR DEPRESSIVE DISORDER: ICD-10-CM

## 2025-02-10 DIAGNOSIS — F41.9 ANXIETY: ICD-10-CM

## 2025-02-10 DIAGNOSIS — R53.83 OTHER FATIGUE: ICD-10-CM

## 2025-02-10 DIAGNOSIS — R26.81 UNSTEADY GAIT: ICD-10-CM

## 2025-02-10 DIAGNOSIS — R41.3 MEMORY DEFICITS: ICD-10-CM

## 2025-02-10 DIAGNOSIS — F33.1 MODERATE EPISODE OF RECURRENT MAJOR DEPRESSIVE DISORDER: Primary | ICD-10-CM

## 2025-02-10 DIAGNOSIS — R68.89 LACK OF INTEREST: ICD-10-CM

## 2025-02-10 DIAGNOSIS — I10 PRIMARY HYPERTENSION: ICD-10-CM

## 2025-02-10 PROCEDURE — 1159F MED LIST DOCD IN RCRD: CPT | Performed by: FAMILY MEDICINE

## 2025-02-10 PROCEDURE — 1160F RVW MEDS BY RX/DR IN RCRD: CPT | Performed by: FAMILY MEDICINE

## 2025-02-10 PROCEDURE — 1160F RVW MEDS BY RX/DR IN RCRD: CPT

## 2025-02-10 PROCEDURE — 1159F MED LIST DOCD IN RCRD: CPT

## 2025-02-10 PROCEDURE — 3078F DIAST BP <80 MM HG: CPT | Performed by: FAMILY MEDICINE

## 2025-02-10 PROCEDURE — 1126F AMNT PAIN NOTED NONE PRSNT: CPT | Performed by: FAMILY MEDICINE

## 2025-02-10 PROCEDURE — 96127 BRIEF EMOTIONAL/BEHAV ASSMT: CPT

## 2025-02-10 PROCEDURE — 99214 OFFICE O/P EST MOD 30 MIN: CPT

## 2025-02-10 PROCEDURE — 3075F SYST BP GE 130 - 139MM HG: CPT

## 2025-02-10 PROCEDURE — 3074F SYST BP LT 130 MM HG: CPT | Performed by: FAMILY MEDICINE

## 2025-02-10 PROCEDURE — G2211 COMPLEX E/M VISIT ADD ON: HCPCS | Performed by: FAMILY MEDICINE

## 2025-02-10 PROCEDURE — 99214 OFFICE O/P EST MOD 30 MIN: CPT | Performed by: FAMILY MEDICINE

## 2025-02-10 PROCEDURE — 3078F DIAST BP <80 MM HG: CPT

## 2025-02-10 RX ORDER — ATOMOXETINE 18 MG/1
18 CAPSULE ORAL DAILY
Qty: 30 CAPSULE | Refills: 0 | Status: SHIPPED | OUTPATIENT
Start: 2025-02-10 | End: 2026-02-10

## 2025-02-10 RX ORDER — BUPROPION HYDROCHLORIDE 300 MG/1
300 TABLET ORAL EVERY MORNING
Qty: 30 TABLET | Refills: 1 | Status: SHIPPED | OUTPATIENT
Start: 2025-02-10 | End: 2026-02-10

## 2025-02-10 RX ORDER — OLMESARTAN MEDOXOMIL 20 MG/1
20 TABLET ORAL DAILY
Qty: 90 TABLET | Refills: 1 | Status: SHIPPED | OUTPATIENT
Start: 2025-02-10

## 2025-02-10 NOTE — PROGRESS NOTES
Chief Complaint   Patient presents with    Hypertension     recheck    Fatigue     Sleeps almost on and off all day    Dizziness     Worse in am and whenever she gets up       Colleen Morgan is a pleasant 74 y.o. female who is here for dizziness, gait instability, fatigue, depression and cognitive decline.  Her  is present today at appointment and helps provide medical history/information.   is helping manage wife's prescriptions and reports that she is getting all medication as prescribed on medication list.  Patient reports feeling very unsteady when she stands or walks.  This usually occurs in the afternoons.  Her blood pressure on repeat is low in the 110s/60s.  She is dizzy today and unsteady.  Has upcoming appointment with neurology for further assessment.  Still waiting on MRI imaging - this had to be rescheduled from 02/07 and patient will call to schedule.  She reports lack of interest/motivation and sleeping most of the day.  No interest in intimacy with .  Established with psychiatry and has appointment today for further evaluation.  Compliant on psychiatric medications.    Past Medical History:   Diagnosis Date    ADHD (attention deficit hyperactivity disorder)     Allergic     Arthritis     Arthritis of back 2000    Arthritis of neck ?    Asthma     Bursitis of hip 2008    Colon polyp     Coronary artery disease     Depression     GERD (gastroesophageal reflux disease)     Headache     Heart murmur     Hip arthrosis     HL (hearing loss)     Hyperparathyroidism 2019    Noted by my cardiologist    Hypertension     Irritable bowel syndrome     Low back pain     Low back strain 2010    Neck strain 2001    Osteopenia     Osteoporosis     Periarthritis of shoulder 2024    Found in MRI of rotator cuff    Rotator cuff syndrome     Tennis elbow 2000    Urinary tract infection     Visual impairment     Vitamin D deficiency     Wrist sprain 2005    Resulted from falling       Past  Surgical History:   Procedure Laterality Date    ADENOIDECTOMY  1956    With tonsilectomy    APPENDECTOMY  1968    CARDIAC CATHETERIZATION      CATARACT EXTRACTION Bilateral 2012    COLONOSCOPY  6/2:    Colonoscopy and upper GI    HYSTERECTOMY  1996    SHOULDER ARTHROSCOPY Right 02/19/2024    arthroscopic rotator cuff repair, arthroscopic biceps tenodesis, arthroscopic extensive debridement, arthroscopic subacromial decompression with acromioplasty, Dr. Regan Vann    SHOULDER SURGERY  02/24    Right Rotator cuff, bicep trars    SINUS SURGERY      x 2    TONSILLECTOMY AND ADENOIDECTOMY      TUBAL ABDOMINAL LIGATION  1979       Family History   Problem Relation Age of Onset    Diabetes Mother     Arthritis Mother     Hypertension Mother     Osteoarthritis Mother     Hearing loss Mother     Osteoporosis Mother     Heart failure Father     Bipolar disorder Father     Alcohol abuse Father     Depression Father     Hearing loss Father     Hyperlipidemia Father     Anxiety disorder Daughter     Depression Daughter     Anxiety disorder Daughter     Depression Daughter     Developmental Disability Daughter     Learning disabilities Daughter     Hypertension Brother     Thyroid disease Brother     Anesthesia problems Brother     Clotting disorder Brother     Diabetes Brother        Social History     Socioeconomic History    Marital status:    Tobacco Use    Smoking status: Never     Passive exposure: Past    Smokeless tobacco: Never    Tobacco comments:     Tried a few times but quit   Vaping Use    Vaping status: Never Used   Substance and Sexual Activity    Alcohol use: Yes     Alcohol/week: 1.0 - 2.0 standard drink of alcohol     Types: 1 - 2 Glasses of wine per week     Comment: With dinner    Drug use: Never    Sexual activity: Yes     Partners: Male     Birth control/protection: Post-menopausal, Tubal ligation, Hysterectomy       Allergies   Allergen Reactions    Ace Inhibitors Anaphylaxis and Other (See  "Comments)     Taken with avelox - throat swelling    Fluticasone Other (See Comments)     \"Increases heart rate, dizziness, felt like something is not right\"    Moxifloxacin Other (See Comments)     When taken with With lisinopril- throat swelling    Pravastatin Myalgia     Stopped per Dr Ernandez due to leg cramps severe       ROS  Review of Systems   Constitutional:  Positive for fatigue. Negative for chills and fever.   Respiratory:  Negative for cough, shortness of breath and wheezing.    Cardiovascular:  Negative for chest pain, palpitations and leg swelling.   Genitourinary:  Negative for dysuria.   Neurological:  Positive for dizziness, light-headedness and memory problem. Negative for headache.   Psychiatric/Behavioral:  Positive for behavioral problems, sleep disturbance and depressed mood. Negative for self-injury and suicidal ideas. The patient is nervous/anxious.        Vitals:    02/10/25 1329 02/10/25 1341 02/10/25 1343 02/10/25 1345   BP: 122/62      Pulse: 67      SpO2: 98% 97% 99% 97%   Weight: 65.8 kg (145 lb)      Height: 162.6 cm (64.02\")        Body mass index is 24.87 kg/m².    BMI is within normal parameters. No other follow-up for BMI required.      Current Outpatient Medications on File Prior to Visit   Medication Sig Dispense Refill    amLODIPine (NORVASC) 5 MG tablet Take 1 tablet by mouth Daily. 90 tablet 1    cephalexin (KEFLEX) 250 MG capsule Take 1 capsule by mouth Every Night. 90 capsule 1    Cholecalciferol 50 MCG (2000 UT) capsule Take  by mouth.      cycloSPORINE (Restasis) 0.05 % ophthalmic emulsion Apply 1 drop to eye(s) as directed by provider Every 12 (Twelve) Hours.      estradiol (ESTRACE) 0.1 MG/GM vaginal cream Insert 2 g into the vagina 2 (Two) Times a Week. 42.5 g 2    levocetirizine (XYZAL) 5 MG tablet Take 1 tablet by mouth Every Evening. 90 tablet 3    magnesium oxide (MAG-OX) 400 MG tablet Take 1 tablet by mouth Daily. 90 tablet 1    nebivolol (BYSTOLIC) 10 MG tablet " Take 1 tablet by mouth Daily. 90 tablet 1    pantoprazole (PROTONIX) 40 MG EC tablet Take 1 tablet by mouth Daily. 90 tablet 3    rosuvastatin (Crestor) 5 MG tablet Take 1 tablet by mouth Daily. 30 tablet 0    atomoxetine (Strattera) 18 MG capsule Take 1 capsule by mouth Daily. 30 capsule 0    buPROPion XL (Wellbutrin XL) 300 MG 24 hr tablet Take 1 tablet by mouth Every Morning. 30 tablet 1    FLUoxetine (PROzac) 20 MG capsule Take 1 capsule by mouth Daily. 30 capsule 2     Current Facility-Administered Medications on File Prior to Visit   Medication Dose Route Frequency Provider Last Rate Last Admin    [START ON 3/10/2025] denosumab (PROLIA) syringe 60 mg  60 mg Subcutaneous Once Ebonie Lilly,            Results for orders placed or performed in visit on 11/22/24   Renal Function Panel    Collection Time: 11/22/24  1:23 PM   Result Value Ref Range    Glucose 107 (H) 70 - 99 mg/dL    BUN 13 8 - 27 mg/dL    Creatinine 1.22 (H) 0.57 - 1.00 mg/dL    EGFR Result 46 (L) >59 mL/min/1.73    BUN/Creatinine Ratio 11 (L) 12 - 28    Sodium 133 (L) 134 - 144 mmol/L    Potassium 4.6 3.5 - 5.2 mmol/L    Chloride 97 96 - 106 mmol/L    Total CO2 22 20 - 29 mmol/L    Calcium 10.3 8.7 - 10.3 mg/dL    Phosphorus 2.4 (L) 3.0 - 4.3 mg/dL    Albumin 4.1 3.8 - 4.8 g/dL   Calcium, Ionized    Collection Time: 11/22/24  1:23 PM   Result Value Ref Range    Ionized Calcium 5.4 4.5 - 5.6 mg/dL   PTH, Intact    Collection Time: 11/22/24  1:23 PM   Result Value Ref Range    PTH, Intact 170 (H) 15 - 65 pg/mL       PE    Physical Exam  Vitals reviewed.   Constitutional:       General: She is not in acute distress.     Appearance: Normal appearance. She is well-developed and normal weight. She is not ill-appearing or diaphoretic.   HENT:      Head: Normocephalic and atraumatic.   Eyes:      Extraocular Movements: Extraocular movements intact.      Conjunctiva/sclera: Conjunctivae normal.   Pulmonary:      Effort: No respiratory distress.    Musculoskeletal:         General: Normal range of motion.      Cervical back: Normal range of motion.   Neurological:      General: No focal deficit present.      Mental Status: She is alert.      Coordination: Coordination abnormal.      Gait: Gait abnormal.   Psychiatric:         Attention and Perception: Attention normal. She is attentive.         Mood and Affect: Mood is depressed. Affect is flat.         Speech: Speech normal.         Behavior: Behavior normal. Behavior is cooperative.         Thought Content: Thought content normal.         Cognition and Memory: Cognition is impaired. Memory is impaired.         Judgment: Judgment normal.           A/P    Diagnoses and all orders for this visit:    1. Dizziness (Primary)  2. Unsteady gait  -     Ambulatory Referral to ENT (Otolaryngology)  New.  Started a few weeks ago.  Limits patient's ability to walk.  Will reduce olmesartan incase hypotension is contributing.  Recommend scheduling MRI brain.  Was cancelled on 02/07 due to staffing issue.   has number to call and reschedule.  Keep upcoming appointment with neurology.  Will start referral to ENT for further evaluation as well.    3. Primary hypertension  -     olmesartan (Benicar) 20 MG tablet; Take 1 tablet by mouth Daily.  Dispense: 90 tablet; Refill: 1  Home readings are all within normal limits.  Based on dizziness, and repeat BP today; suspect that hypotension may be contributing.  Will reduce olmesartan from 40 mg to 20 mg daily.  Recommend monitoring BP at home.    4. Other fatigue  5. Lack of interest  6. Moderate episode of recurrent major depressive disorder  Sleeping 20 hours a day.  Has no interest in activities, intimacy and lacks all motivation.  Suspect this is related to depression.  Established with psychiatry - has appointment today.  Reviewed labs - no significant findings to explain symptoms.       Plan of care reviewed with patient at the conclusion of today's visit. Education  was provided regarding diagnosis, management and any prescribed or recommended OTC medications.  Patient verbalizes understanding of and agreement with management plan.    Dictated Utilizing Dragon Dictation     Please note that portions of this note were completed with a voice recognition program.     Part of this note may be an electronic transcription/translation of spoken language to printed text using the Dragon Dictation System.    Return in about 4 weeks (around 3/10/2025) for Recheck, dizziness/HTN.     Marsha Kraft PA-C

## 2025-02-10 NOTE — PROGRESS NOTES
Follow Up Office Visit      Patient Name: Colleen Morgan  : 1950   MRN: [id number]     Referring Provider: Marsha Kraft PA-C    Chief Complaint:      ICD-10-CM ICD-9-CM   1. Moderate episode of recurrent major depressive disorder  F33.1 296.32   2. Anxiety  F41.9 300.00   3. Memory deficits  R41.3 780.93        History of Present Illness:   Colleen Morgan is a 74 y.o. female who is here today for follow up and medication management.       Subjective      Patient Reports:  History of Present Illness  The patient presents for evaluation of depression, hallucinations, cognitive issues, and anxiety. She is accompanied by her , Pino.    She reports a general feeling of unwellness, which led to recent changes in her medication regimen. She reports she has been experiencing hallucinations, such as perceiving a bird building a nest in her room, which prompted her to call her  for assistance. These hallucinations subsided after a few days. Her  reports these hallucinations occurred before the previous visit. She reports having difficulty with her memory. She reports recent feelings of vertigo and clumsiness, necessitating the use of grab bars for balance during activities such as showering. Her olmesartan dosage was reduced from 40 mg to 20 mg, and she was discontinued from Singulair by her PCP.    Over the past 2 weeks, she has been experiencing severe mood disturbances, characterized by apathy and excessive sleep, ranging from 20 to 22 hours per day. She has not experienced any panic attacks since the onset of these symptoms. Her anxiety levels have been elevated, particularly due to familial conflicts involving her two daughters. She has become withdrawn and disinterested in activities. Her  believes her symptoms are situational and related to the fact that she thinks she is losing her daughters. He reports that she has been resistant to his attempts to  encourage her to eat, walk, exercise, and take her medications. She reports she has been easily irritated and angered. She has a history of being on Effexor and Zoloft, neither of which were effective in managing her depression. She was on Effexor for a long time but never felt clear of depression. Her  notes that her mood has been stable since switching to current regimen, but it's been stable depression, whereas before her moods were up and down. Patient denies SI/HI/AVH.    She recently visited her primary care physician alone, where she performed poorly on a cognitive skills assessment. She reports she has lost track of time and is scheduled for a mammogram and brain MRI as part of a neurology workup.    MEDICATIONS  Current: Strattera, Wellbutrin, Prozac  Discontinued:  Effexor, Zoloft.    PHQ-9= 8 decreased score from previous visit  BENIGNO-7= 9 decreased score from previous visit    Review of Systems:   Review of Systems   Constitutional:  Positive for appetite change. Negative for fatigue and unexpected weight change.   Eyes:  Negative for visual disturbance.   Respiratory:  Negative for chest tightness and shortness of breath.    Cardiovascular:  Negative for chest pain.   Musculoskeletal:  Negative for gait problem.   Skin:  Negative for rash and wound.   Neurological:  Negative for dizziness, tremors, seizures, weakness, light-headedness and headaches.   Psychiatric/Behavioral:  Positive for confusion, decreased concentration and dysphoric mood. Negative for agitation, behavioral problems, hallucinations, self-injury, sleep disturbance and suicidal ideas. The patient is nervous/anxious. The patient is not hyperactive.    Sleep pattern: 20-22 hrs per day  Appetite: decreased    PHQ-9 Depression Screening  Little interest or pleasure in doing things? Not at all   Feeling down, depressed, or hopeless? Not at all   PHQ-2 Total Score 0   Trouble falling or staying asleep, or sleeping too much? Not at all    Feeling tired or having little energy? Not at all   Poor appetite or overeating? Almost all   Feeling bad about yourself - or that you are a failure or have let yourself or your family down? Several days   Trouble concentrating on things, such as reading the newspaper or watching television? Several days   Moving or speaking so slowly that other people could have noticed? Or the opposite - being so fidgety or restless that you have been moving around a lot more than usual? Almost all   Thoughts that you would be better off dead, or of hurting yourself in some way? Not at all   PHQ-9 Total Score 8   If you checked off any problems, how difficult have these problems made it for you to do your work, take care of things at home, or get along with other people? Not difficult at all       BENIGNO-7 Anxiety Screening  Over the last two weeks, how often have you been bothered by the following problems?  Feeling nervous, anxious or on edge: More than half the days  Not being able to stop or control worrying: More than half the days  Worrying too much about different things: More than half the days  Trouble Relaxing: Several days  Being so restless that it is hard to sit still: Several days  Becoming easily annoyed or irritable: Several days  Feeling afraid as if something awful might happen: Not at all  BENIGNO 7 Total Score: 9  If you checked any problems, how difficult have these problems made it for you to do your work, take care of things at home, or get along with other people: Not difficult at all    RISK ASSESSMENT:  Patient denies any thoughts or intent of suicide today. Patient denies any impulsive behavior today.     Medications:     Current Outpatient Medications:     amLODIPine (NORVASC) 5 MG tablet, Take 1 tablet by mouth Daily., Disp: 90 tablet, Rfl: 1    buPROPion XL (Wellbutrin XL) 300 MG 24 hr tablet, Take 1 tablet by mouth Every Morning., Disp: 30 tablet, Rfl: 1    cephalexin (KEFLEX) 250 MG capsule, Take 1  "capsule by mouth Every Night., Disp: 90 capsule, Rfl: 1    Cholecalciferol 50 MCG (2000 UT) capsule, Take  by mouth., Disp: , Rfl:     cycloSPORINE (Restasis) 0.05 % ophthalmic emulsion, Apply 1 drop to eye(s) as directed by provider Every 12 (Twelve) Hours., Disp: , Rfl:     estradiol (ESTRACE) 0.1 MG/GM vaginal cream, Insert 2 g into the vagina 2 (Two) Times a Week., Disp: 42.5 g, Rfl: 2    FLUoxetine (PROzac) 20 MG capsule, Take 1 capsule by mouth Daily., Disp: 30 capsule, Rfl: 2    levocetirizine (XYZAL) 5 MG tablet, Take 1 tablet by mouth Every Evening., Disp: 90 tablet, Rfl: 3    magnesium oxide (MAG-OX) 400 MG tablet, Take 1 tablet by mouth Daily., Disp: 90 tablet, Rfl: 1    nebivolol (BYSTOLIC) 10 MG tablet, Take 1 tablet by mouth Daily., Disp: 90 tablet, Rfl: 1    olmesartan (Benicar) 20 MG tablet, Take 1 tablet by mouth Daily., Disp: 90 tablet, Rfl: 1    pantoprazole (PROTONIX) 40 MG EC tablet, Take 1 tablet by mouth Daily., Disp: 90 tablet, Rfl: 3    rosuvastatin (Crestor) 5 MG tablet, Take 1 tablet by mouth Daily., Disp: 30 tablet, Rfl: 0    atomoxetine (Strattera) 18 MG capsule, Take 1 capsule by mouth Daily., Disp: 30 capsule, Rfl: 0    Current Facility-Administered Medications:     [START ON 3/10/2025] denosumab (PROLIA) syringe 60 mg, 60 mg, Subcutaneous, Once, Ebonie Lilly, DO    Medication Considerations:  MATTHEW reviewed and appropriate.      Allergies:   Allergies   Allergen Reactions    Ace Inhibitors Anaphylaxis and Other (See Comments)     Taken with avelox - throat swelling    Fluticasone Other (See Comments)     \"Increases heart rate, dizziness, felt like something is not right\"    Moxifloxacin Other (See Comments)     When taken with With lisinopril- throat swelling    Pravastatin Myalgia     Stopped per Dr Ernandez due to leg cramps severe       Results Reviewed:   Yes     Objective     Physical Exam:  Vital Signs:   Vitals:    02/10/25 1522   BP: 130/70   Pulse: 70   SpO2: 98% " "  Weight: 66.1 kg (145 lb 11.2 oz)   Height: 162.6 cm (64.02\")     Body mass index is 24.99 kg/m².     Mental Status Exam:   MENTAL STATUS EXAM   General Appearance:  Cleanly groomed and dressed and well developed  Eye Contact:  Good eye contact  Attitude:  Cooperative  Motor Activity:  Normal gait, posture  Muscle Strength:  Normal  Speech:  Normal rate, tone, volume  Language:  Spontaneous  Mood and affect:  Depressed and anxious  Hopelessness:  5  Loneliness: 2  Thought Process:  Circumstantial  Associations/ Thought Content:  No delusions  Hallucinations:  None  Suicidal Ideations:  Not present  Homicidal Ideation:  Not present  Sensorium:  Alert and clear  Orientation:  Person, place, time and situation  Immediate Recall, Recent, and Remote Memory:  Deficit noted  Attention Span/ Concentration:  Easily distracted  Fund of Knowledge:  Appropriate for age and educational level  Intellectual Functioning:  Average range  Insight:  Fair  Judgement:  Fair  Reliability:  Fair  Impulse Control:  Fair       @RESULASTCBCDIFFPANEL,TSH,LABLIPI,[id number],DFXJHGAT10,MG,FOLATE,PROLACTIN,CRPRESULT,CMP,[id number])@    Lab Results   Component Value Date    GLUCOSE 107 (H) 11/22/2024    BUN 13 11/22/2024    CREATININE 1.22 (H) 11/22/2024    EGFRRESULT 46 (L) 11/22/2024    EGFR 48.6 (L) 08/21/2024    BCR 11 (L) 11/22/2024    K 4.6 11/22/2024    CO2 22 11/22/2024    CALCIUM 10.3 11/22/2024    PROTENTOTREF 5.2 (L) 07/09/2024    ALBUMIN 4.1 11/22/2024    BILITOT 0.3 07/22/2024    AST 28 07/22/2024    ALT 33 07/22/2024       Lab Results   Component Value Date    WBC 16.25 (H) 07/10/2024    HGB 12.2 07/10/2024    HCT 35.4 07/10/2024    MCV 89.6 07/10/2024     07/10/2024       Lab Results   Component Value Date    CHOL 269 (H) 12/18/2023    TRIG 201 (H) 12/18/2023    HDL 76 (H) 12/18/2023     (H) 12/18/2023       Assessment / Plan      Visit Diagnosis/Orders Placed This Visit:  Diagnoses and all orders for this " visit:    1. Moderate episode of recurrent major depressive disorder (Primary)  -     FLUoxetine (PROzac) 20 MG capsule; Take 1 capsule by mouth Daily.  Dispense: 30 capsule; Refill: 2    2. Anxiety  -     FLUoxetine (PROzac) 20 MG capsule; Take 1 capsule by mouth Daily.  Dispense: 30 capsule; Refill: 2    3. Memory deficits    Other orders  -     atomoxetine (Strattera) 18 MG capsule; Take 1 capsule by mouth Daily.  Dispense: 30 capsule; Refill: 0  -     buPROPion XL (Wellbutrin XL) 300 MG 24 hr tablet; Take 1 tablet by mouth Every Morning.  Dispense: 30 tablet; Refill: 1       Assessment & Plan  1. Depression.  She reports a significant increase in depressive symptoms, including sleeping 20-22 hours a day, irritability, and lack of interest in activities. She has been on Effexor and Zoloft in the past, which were ineffective. Currently, she is on Strattera, but it is unclear if it is effective. A neurology work-up, including a brain MRI, is scheduled to rule out neurological causes.    2. Hallucinations.  She has experienced hallucinations, such as seeing a bird in the vent. This symptom has been present since before the initial visit. A neurology work-up, including a brain MRI, is scheduled to rule out neurological causes.    3. Cognitive issues.  She reports cognitive difficulties, including confusion and memory issues. These symptoms have been persistent and concerning. A neurology work-up, including a brain MRI, is scheduled to rule out neurological causes.    4. Anxiety.  She reports increased anxiety, particularly related to family issues. She has had panic attacks in the past but not recently.      Functional Status: Moderate impairment     Prognosis: Fair with Ongoing Treatment     Impression/Formulation:  Patient appeared alert and oriented.  Patient is voluntarily requesting to continue outpatient psychiatric treatment at [facility] [address] [facility].  Patient is receptive to  assistance with maintaining a stable lifestyle.  Patient presents with history of     ICD-10-CM ICD-9-CM   1. Moderate episode of recurrent major depressive disorder  F33.1 296.32   2. Anxiety  F41.9 300.00   3. Memory deficits  R41.3 780.93   .    Reviewed patient's previous provider notes. Reviewed most recent labs. Patient meets DSM V diagnostic criteria for diagnoses. Diagnoses may be updated as more information becomes available.       Treatment Plan:   Discontinue Strattera as prescribed   Continue Wellbutrin 300mg PO qam  Decreased Prozac to 20mg from 40mg  Encouraged to pursue psychotherapy.  Future appointment scheduled  Follow-up in 4 weeks and as needed    Patient will continue supportive psychotherapy efforts and medications as indicated. Clinic will obtain release of information for current treatment team for continuity of care as needed. Patient will contact this office, call 911 or present to the nearest emergency room should suicidal or homicidal ideations occur.  Discussed medication options and treatment plan of prescribed medication(s) as well as the risks, benefits, and potential side effects. Patient acknowledged and verbally consented to continue with current treatment plan and was educated on the importance of compliance with treatment and follow-up appointments.     Pt has no previous history of seizure or current eating disorder, which would be a contraindication for use. The possibility of activation with initiation was discussed and patient verbalizes understanding.     Medication risks and side effects discussed with patient including risk for worsening mood, changes in behavior, thoughts of suicide or homicide, induction of bertin, serotonin syndrome, rare hyponatremia, rare SIADH, withdrawal symptoms if abrupt withdrawal, sexual dysfunction.   If any thoughts of SI or HI, worsening mood or changes in behavior, call 911 or crisis line [phone], or go to nearest ER at once. Patient agrees to  notify close family/friend of new trial of antidepressants/info above. Pt.verbalizes understanding and consents to treatment with this medication.     Quality Measures:   Never smoker    I advised Colleen Morgan of the risks of tobacco use.     Follow Up:   Return in about 4 weeks (around 3/10/2025).      Patient or patient representative verbalized consent for the use of Ambient Listening during the visit with  DANIEL Tsai for chart documentation. 2/10/2025  15:53 EST    DANIEL Tsai  [facility] [address]

## 2025-02-21 ENCOUNTER — HOSPITAL ENCOUNTER (OUTPATIENT)
Dept: MRI IMAGING | Facility: HOSPITAL | Age: 75
Discharge: HOME | End: 2025-02-21
Payer: MEDICARE

## 2025-02-21 DIAGNOSIS — R26.81 GENERALLY UNSTEADY: ICD-10-CM

## 2025-02-21 DIAGNOSIS — R41.89 COGNITIVE IMPAIRMENT: ICD-10-CM

## 2025-02-21 PROCEDURE — 70551 MRI BRAIN STEM W/O DYE: CPT

## 2025-03-04 ENCOUNTER — LAB (OUTPATIENT)
Dept: ONCOLOGY | Facility: HOSPITAL | Age: 75
End: 2025-03-04
Payer: MEDICARE

## 2025-03-04 VITALS
HEART RATE: 60 BPM | DIASTOLIC BLOOD PRESSURE: 68 MMHG | WEIGHT: 143.2 LBS | RESPIRATION RATE: 18 BRPM | SYSTOLIC BLOOD PRESSURE: 114 MMHG | TEMPERATURE: 96.2 F | BODY MASS INDEX: 26.19 KG/M2

## 2025-03-04 DIAGNOSIS — E83.52 HYPERCALCEMIA: ICD-10-CM

## 2025-03-04 PROCEDURE — 36415 COLL VENOUS BLD VENIPUNCTURE: CPT

## 2025-03-05 LAB
ALBUMIN SERPL-MCNC: 4 G/DL (ref 3.5–5.2)
BUN SERPL-MCNC: 16 MG/DL (ref 8–23)
BUN/CREAT SERPL: 12 (ref 7–25)
CALCIUM SERPL-MCNC: 10.2 MG/DL (ref 8.6–10.5)
CHLORIDE SERPL-SCNC: 101 MMOL/L (ref 98–107)
CO2 SERPL-SCNC: 21.3 MMOL/L (ref 22–29)
CREAT SERPL-MCNC: 1.33 MG/DL (ref 0.57–1)
EGFRCR SERPLBLD CKD-EPI 2021: 41.8 ML/MIN/1.73
GLUCOSE SERPL-MCNC: 82 MG/DL (ref 65–99)
PHOSPHATE SERPL-MCNC: 3.1 MG/DL (ref 2.5–4.5)
POTASSIUM SERPL-SCNC: 5.1 MMOL/L (ref 3.5–5.2)
SODIUM SERPL-SCNC: 136 MMOL/L (ref 136–145)

## 2025-03-10 ENCOUNTER — OFFICE VISIT (OUTPATIENT)
Dept: FAMILY MEDICINE CLINIC | Facility: CLINIC | Age: 75
End: 2025-03-10
Payer: MEDICARE

## 2025-03-10 ENCOUNTER — OFFICE VISIT (OUTPATIENT)
Age: 75
End: 2025-03-10
Payer: MEDICARE

## 2025-03-10 VITALS
HEIGHT: 62 IN | HEART RATE: 68 BPM | DIASTOLIC BLOOD PRESSURE: 50 MMHG | WEIGHT: 142.8 LBS | TEMPERATURE: 97.4 F | OXYGEN SATURATION: 96 % | BODY MASS INDEX: 26.28 KG/M2 | SYSTOLIC BLOOD PRESSURE: 110 MMHG

## 2025-03-10 VITALS
HEART RATE: 58 BPM | DIASTOLIC BLOOD PRESSURE: 80 MMHG | BODY MASS INDEX: 26.5 KG/M2 | HEIGHT: 62 IN | OXYGEN SATURATION: 98 % | WEIGHT: 144 LBS | SYSTOLIC BLOOD PRESSURE: 118 MMHG

## 2025-03-10 DIAGNOSIS — I10 PRIMARY HYPERTENSION: ICD-10-CM

## 2025-03-10 DIAGNOSIS — R41.3 MEMORY DEFICITS: ICD-10-CM

## 2025-03-10 DIAGNOSIS — R42 DIZZINESS: Primary | ICD-10-CM

## 2025-03-10 DIAGNOSIS — F41.9 ANXIETY: ICD-10-CM

## 2025-03-10 DIAGNOSIS — F33.1 MODERATE EPISODE OF RECURRENT MAJOR DEPRESSIVE DISORDER: Primary | ICD-10-CM

## 2025-03-10 PROCEDURE — 1160F RVW MEDS BY RX/DR IN RCRD: CPT | Performed by: PHYSICIAN ASSISTANT

## 2025-03-10 PROCEDURE — 3074F SYST BP LT 130 MM HG: CPT | Performed by: PHYSICIAN ASSISTANT

## 2025-03-10 PROCEDURE — 3078F DIAST BP <80 MM HG: CPT | Performed by: PHYSICIAN ASSISTANT

## 2025-03-10 PROCEDURE — 1159F MED LIST DOCD IN RCRD: CPT | Performed by: PHYSICIAN ASSISTANT

## 2025-03-10 PROCEDURE — 1126F AMNT PAIN NOTED NONE PRSNT: CPT | Performed by: PHYSICIAN ASSISTANT

## 2025-03-10 PROCEDURE — G2211 COMPLEX E/M VISIT ADD ON: HCPCS | Performed by: PHYSICIAN ASSISTANT

## 2025-03-10 PROCEDURE — 99213 OFFICE O/P EST LOW 20 MIN: CPT | Performed by: PHYSICIAN ASSISTANT

## 2025-03-10 NOTE — PROGRESS NOTES
Chief Complaint   Patient presents with    Dizziness     FU, has been better but still not good. Feels dizzy when bending over, and trying to get up to walk       Colleen Morgan is a pleasant 74 y.o. female who is here for routine follow-up of dizziness.  Patient's Olmesartan was reduced at last visit from 40 mg to 20 mg.  Her HCTZ was discontinued prior to that.  She reports ongoing dizziness and lightheadedness with standing.  Does feel the dizziness is improving.  Blood pressure on repeat is 130/60.  Her readings from other appointments are all low.  She has not been monitoring at home.  Family is not present today.    Past Medical History:   Diagnosis Date    ADHD (attention deficit hyperactivity disorder)     Allergic     Arthritis     Arthritis of back 2000    Arthritis of neck ?    Asthma     Bursitis of hip 2008    Colon polyp     Coronary artery disease     Depression     GERD (gastroesophageal reflux disease)     Headache     Heart murmur     Hip arthrosis     HL (hearing loss)     Hyperparathyroidism 2019    Noted by my cardiologist    Hypertension     Irritable bowel syndrome     Low back pain     Low back strain 2010    Neck strain 2001    Osteopenia     Osteoporosis     Periarthritis of shoulder 2024    Found in MRI of rotator cuff    Rotator cuff syndrome     Tennis elbow 2000    Urinary tract infection     Visual impairment     Vitamin D deficiency     Wrist sprain 2005    Resulted from falling       Past Surgical History:   Procedure Laterality Date    ADENOIDECTOMY  1956    With tonsilectomy    APPENDECTOMY  1968    CARDIAC CATHETERIZATION      CATARACT EXTRACTION Bilateral 2012    COLONOSCOPY  6/2:    Colonoscopy and upper GI    HYSTERECTOMY  1996    SHOULDER ARTHROSCOPY Right 02/19/2024    arthroscopic rotator cuff repair, arthroscopic biceps tenodesis, arthroscopic extensive debridement, arthroscopic subacromial decompression with acromioplasty, Dr. Regan Vann    SHOULDER SURGERY  02/24  "   Right Rotator cuff, bicep trars    SINUS SURGERY      x 2    TONSILLECTOMY AND ADENOIDECTOMY      TUBAL ABDOMINAL LIGATION  1979       Family History   Problem Relation Age of Onset    Diabetes Mother     Arthritis Mother     Hypertension Mother     Osteoarthritis Mother     Hearing loss Mother     Osteoporosis Mother     Heart failure Father     Bipolar disorder Father     Alcohol abuse Father     Depression Father     Hearing loss Father     Hyperlipidemia Father     Anxiety disorder Daughter     Depression Daughter     Anxiety disorder Daughter     Depression Daughter     Developmental Disability Daughter     Learning disabilities Daughter     Hypertension Brother     Thyroid disease Brother     Anesthesia problems Brother     Clotting disorder Brother     Diabetes Brother        Social History     Socioeconomic History    Marital status:    Tobacco Use    Smoking status: Never     Passive exposure: Past    Smokeless tobacco: Never    Tobacco comments:     Tried a few times but quit   Vaping Use    Vaping status: Never Used   Substance and Sexual Activity    Alcohol use: Yes     Alcohol/week: 1.0 - 2.0 standard drink of alcohol     Types: 1 - 2 Glasses of wine per week     Comment: With dinner    Drug use: Never    Sexual activity: Yes     Partners: Male     Birth control/protection: Post-menopausal, Tubal ligation, Hysterectomy       Allergies   Allergen Reactions    Ace Inhibitors Anaphylaxis and Other (See Comments)     Taken with avelox - throat swelling    Fluticasone Other (See Comments)     \"Increases heart rate, dizziness, felt like something is not right\"    Moxifloxacin Other (See Comments)     When taken with With lisinopril- throat swelling    Pravastatin Myalgia     Stopped per Dr Ernandez due to leg cramps severe       ROS  Review of Systems   Constitutional:  Negative for chills and fever.   Respiratory:  Negative for cough, shortness of breath and wheezing.    Cardiovascular:  Negative for " "chest pain, palpitations and leg swelling.   Neurological:  Positive for dizziness and light-headedness.   Psychiatric/Behavioral:  Positive for sleep disturbance and depressed mood.        Vitals:    03/10/25 1343   BP: 110/50   BP Location: Right arm   Patient Position: Sitting   Cuff Size: Adult   Pulse: 68   Temp: 97.4 °F (36.3 °C)   TempSrc: Oral   SpO2: 96%   Weight: 64.8 kg (142 lb 12.8 oz)   Height: 157.5 cm (62\")     Body mass index is 26.12 kg/m².           Current Outpatient Medications on File Prior to Visit   Medication Sig Dispense Refill    amLODIPine (NORVASC) 5 MG tablet Take 1 tablet by mouth Daily. 90 tablet 1    atomoxetine (Strattera) 18 MG capsule Take 1 capsule by mouth Daily. 30 capsule 0    buPROPion XL (Wellbutrin XL) 300 MG 24 hr tablet Take 1 tablet by mouth Every Morning. 30 tablet 1    cephalexin (KEFLEX) 250 MG capsule Take 1 capsule by mouth Every Night. 90 capsule 1    Cholecalciferol 50 MCG (2000 UT) capsule Take  by mouth.      cycloSPORINE (Restasis) 0.05 % ophthalmic emulsion Apply 1 drop to eye(s) as directed by provider Every 12 (Twelve) Hours.      estradiol (ESTRACE) 0.1 MG/GM vaginal cream Insert 2 g into the vagina 2 (Two) Times a Week. 42.5 g 2    FLUoxetine (PROzac) 20 MG capsule Take 1 capsule by mouth Daily. 30 capsule 2    levocetirizine (XYZAL) 5 MG tablet Take 1 tablet by mouth Every Evening. 90 tablet 3    magnesium oxide (MAG-OX) 400 MG tablet Take 1 tablet by mouth Daily. 90 tablet 1    nebivolol (BYSTOLIC) 10 MG tablet Take 1 tablet by mouth Daily. 90 tablet 1    pantoprazole (PROTONIX) 40 MG EC tablet Take 1 tablet by mouth Daily. 90 tablet 3    rosuvastatin (Crestor) 5 MG tablet Take 1 tablet by mouth Daily. 30 tablet 0    [DISCONTINUED] olmesartan (Benicar) 20 MG tablet Take 1 tablet by mouth Daily. 90 tablet 1     Current Facility-Administered Medications on File Prior to Visit   Medication Dose Route Frequency Provider Last Rate Last Admin    denosumab " (PROLIA) syringe 60 mg  60 mg Subcutaneous Once Ebonie Lilly DO           Results for orders placed or performed in visit on 03/04/25   Renal Function Panel    Collection Time: 03/04/25 12:05 PM    Specimen: Blood    Blood  Release to seferino   Result Value Ref Range    Glucose 82 65 - 99 mg/dL    BUN 16 8 - 23 mg/dL    Creatinine 1.33 (H) 0.57 - 1.00 mg/dL    EGFR Result 41.8 (L) >60.0 mL/min/1.73    BUN/Creatinine Ratio 12.0 7.0 - 25.0    Sodium 136 136 - 145 mmol/L    Potassium 5.1 3.5 - 5.2 mmol/L    Chloride 101 98 - 107 mmol/L    Total CO2 21.3 (L) 22.0 - 29.0 mmol/L    Calcium 10.2 8.6 - 10.5 mg/dL    Phosphorus 3.1 2.5 - 4.5 mg/dL    Albumin 4.0 3.5 - 5.2 g/dL       PE    Physical Exam  Vitals reviewed.   Constitutional:       General: She is not in acute distress.     Appearance: Normal appearance. She is well-developed and normal weight. She is not ill-appearing or diaphoretic.   HENT:      Head: Normocephalic and atraumatic.   Eyes:      Extraocular Movements: Extraocular movements intact.      Conjunctiva/sclera: Conjunctivae normal.   Pulmonary:      Effort: No respiratory distress.   Musculoskeletal:         General: Normal range of motion.      Cervical back: Normal range of motion.   Neurological:      General: No focal deficit present.      Mental Status: She is alert.   Psychiatric:         Attention and Perception: She is attentive.         Mood and Affect: Mood normal.         Speech: Speech normal.         Behavior: Behavior normal. Behavior is cooperative.         Thought Content: Thought content normal.         Judgment: Judgment normal.           A/P    Diagnoses and all orders for this visit:    1. Dizziness (Primary)  Ongoing, positional.  Lightheadedness with standing.  Has upcoming appointment with ENT and neurology.  Will continue to reduce BP meds incase that is contributing.    2. Primary hypertension  Repeat is 130/60.  Readings at other appointments are low.  Stop  Olmesartan.  Continue on Nebivolol 10 mg in AM and amlodipine 5 mg at night.  Monitor at home and call if elevated BP.  Return in 4-6 weeks.       Plan of care reviewed with patient at the conclusion of today's visit. Education was provided regarding diagnosis, management and any prescribed or recommended OTC medications.  Patient verbalizes understanding of and agreement with management plan.    Dictated Utilizing Dragon Dictation     Please note that portions of this note were completed with a voice recognition program.     Part of this note may be an electronic transcription/translation of spoken language to printed text using the Dragon Dictation System.    Return in about 4 weeks (around 4/7/2025) for Recheck, HTN/dizziness.     Marsha Kraft PA-C

## 2025-03-10 NOTE — PROGRESS NOTES
Follow Up Office Visit      Patient Name: Colleen Morgan  : 1950   MRN: [id number]     Referring Provider: Marsha Kraft PA-C    Chief Complaint:      ICD-10-CM ICD-9-CM   1. Moderate episode of recurrent major depressive disorder  F33.1 296.32   2. Anxiety  F41.9 300.00   3. Memory deficits  R41.3 780.93        History of Present Illness:   Colleen Morgan is a 74 y.o. female who is here today for follow up and medication management.        Subjective      Patient Reports:   History of Present Illness  Her  Pino accompanied her throughout the entire visit.  She reports persistent anxiety and difficulty focusing, despite discontinuing Strattera. She experiences constant depression, rating her depressive symptoms as 9 on a scale of 10. She has not observed any significant changes following the reduction of her Prozac dosage from 40 mg to 20 mg. She reports having no akiko. She reports no suicidal or homicidal ideation, self-harm, or hallucinations. She has an upcoming appointment with a therapist on 2025.  She rates her anxiety as a 6 out of 10.  She denies panic attacks, anger outburst and lashing out.    She continues to experience excessive sleepiness, sleeping approximately 20 hours per day. She has undergone a brain scan, which patient's  reports did not reveal any signs of dementia, and has an appointment scheduled with neurology next month. She also reports visual disturbances, such as seeing shadows, which resolve upon turning on the light. She has a mammogram scheduled for Wednesday.     She reports a decrease in appetite, consuming only one meal per day, typically breakfast, and often skipping dinner. She experiences dizziness and a sensation of her head swimming.  Her  reports he has not noticed any changes since discontinuing Strattera and decreasing dose of Prozac.  He reports she is still sleeping an excessive amounts.  He reports they have  been trying to incorporate walks into routine, but this is difficult as she shuffles her feet and sometimes falls forward.  He reports she is still experiencing memory issues.  He reports he manages her medications.    She has an endocrinology appointment next week and is scheduled to receive an injection tomorrow. She has an upcoming [place] but expresses concern due to the physical exertion required.    MEDICATIONS  Current: Prozac, Wellbutrin  Discontinued: Strattera  Past: Effexor, Zoloft    PHQ-9= 18 increased score from previous visit  BENIGNO-7= 21 increased score from previous visit    Review of Systems:   Review of Systems   Constitutional:  Positive for appetite change. Negative for fatigue and unexpected weight change.   Eyes:  Negative for visual disturbance.   Respiratory:  Negative for chest tightness and shortness of breath.    Cardiovascular:  Negative for chest pain.   Musculoskeletal:  Negative for gait problem.   Skin:  Negative for rash and wound.   Neurological:  Negative for dizziness, tremors, seizures, weakness, light-headedness and headaches.   Psychiatric/Behavioral:  Positive for confusion, decreased concentration and dysphoric mood. Negative for agitation, behavioral problems, hallucinations, self-injury, sleep disturbance and suicidal ideas. The patient is nervous/anxious. The patient is not hyperactive.    Sleep pattern: 20+ hours per day  Appetite:  decreased    PHQ-9 Depression Screening  Little interest or pleasure in doing things? Almost all   Feeling down, depressed, or hopeless? Almost all   PHQ-2 Total Score 6   Trouble falling or staying asleep, or sleeping too much? Not at all   Feeling tired or having little energy? Almost all   Poor appetite or overeating? Almost all   Feeling bad about yourself - or that you are a failure or have let yourself or your family down? Almost all   Trouble concentrating on things, such as reading the newspaper or watching  television? Almost all   Moving or speaking so slowly that other people could have noticed? Or the opposite - being so fidgety or restless that you have been moving around a lot more than usual? Not at all   Thoughts that you would be better off dead, or of hurting yourself in some way? Not at all   PHQ-9 Total Score 18   If you checked off any problems, how difficult have these problems made it for you to do your work, take care of things at home, or get along with other people? Extremely difficult       BENIGNO-7 Anxiety Screening  Over the last two weeks, how often have you been bothered by the following problems?  Feeling nervous, anxious or on edge: Nearly every day  Not being able to stop or control worrying: Nearly every day  Worrying too much about different things: Nearly every day  Trouble Relaxing: Nearly every day  Being so restless that it is hard to sit still: Nearly every day  Becoming easily annoyed or irritable: Nearly every day  Feeling afraid as if something awful might happen: Nearly every day  BENIGNO 7 Total Score: 21  If you checked any problems, how difficult have these problems made it for you to do your work, take care of things at home, or get along with other people: Very difficult    RISK ASSESSMENT:  Patient denies any thoughts or intent of suicide today. Patient denies any impulsive behavior today.     Medications:     Current Outpatient Medications:     amLODIPine (NORVASC) 5 MG tablet, Take 1 tablet by mouth Daily., Disp: 90 tablet, Rfl: 1    buPROPion XL (Wellbutrin XL) 300 MG 24 hr tablet, Take 1 tablet by mouth Every Morning., Disp: 30 tablet, Rfl: 1    cephalexin (KEFLEX) 250 MG capsule, Take 1 capsule by mouth Every Night., Disp: 90 capsule, Rfl: 1    Cholecalciferol 50 MCG (2000 UT) capsule, Take  by mouth., Disp: , Rfl:     cycloSPORINE (Restasis) 0.05 % ophthalmic emulsion, Apply 1 drop to eye(s) as directed by provider Every 12 (Twelve) Hours., Disp: , Rfl:     estradiol (ESTRACE)  "0.1 MG/GM vaginal cream, Insert 2 g into the vagina 2 (Two) Times a Week., Disp: 42.5 g, Rfl: 2    levocetirizine (XYZAL) 5 MG tablet, Take 1 tablet by mouth Every Evening., Disp: 90 tablet, Rfl: 3    magnesium oxide (MAG-OX) 400 MG tablet, Take 1 tablet by mouth Daily., Disp: 90 tablet, Rfl: 1    nebivolol (BYSTOLIC) 10 MG tablet, Take 1 tablet by mouth Daily., Disp: 90 tablet, Rfl: 1    pantoprazole (PROTONIX) 40 MG EC tablet, Take 1 tablet by mouth Daily., Disp: 90 tablet, Rfl: 3    rosuvastatin (Crestor) 5 MG tablet, Take 1 tablet by mouth Daily., Disp: 30 tablet, Rfl: 0    Cariprazine HCl (VRAYLAR) 1.5 MG capsule capsule, Take 1 capsule by mouth Daily., Disp: 30 capsule, Rfl: 1    Current Facility-Administered Medications:     denosumab (PROLIA) syringe 60 mg, 60 mg, Subcutaneous, Once, Ebonie Lilly,     Medication Considerations:  MATTHEW reviewed and appropriate.      Allergies:   Allergies   Allergen Reactions    Ace Inhibitors Anaphylaxis and Other (See Comments)     Taken with avelox - throat swelling    Fluticasone Other (See Comments)     \"Increases heart rate, dizziness, felt like something is not right\"    Moxifloxacin Other (See Comments)     When taken with With lisinopril- throat swelling    Pravastatin Myalgia     Stopped per Dr Ernandez due to leg cramps severe       Results Reviewed:   Yes     Objective     Physical Exam:  Vital Signs:   Vitals:    03/10/25 1516   BP: 118/80   Pulse: 58   SpO2: 98%   Weight: 65.3 kg (144 lb)   Height: 157.5 cm (62.01\")     Body mass index is 26.33 kg/m².     Mental Status Exam:   MENTAL STATUS EXAM   General Appearance:  Cleanly groomed and dressed and well developed  Eye Contact:  Good eye contact  Attitude:  Cooperative  Motor Activity:  Normal gait, posture  Muscle Strength:  Normal  Speech:  Normal rate, tone, volume  Language:  Spontaneous  Mood and affect:  Depressed and anxious  Hopelessness:  5  Loneliness: 5  Thought Process:  " Circumstantial  Associations/ Thought Content:  No delusions  Hallucinations:  None  Suicidal Ideations:  Not present  Homicidal Ideation:  Not present  Sensorium:  Alert and clear  Orientation:  Person, place, time and situation  Immediate Recall, Recent, and Remote Memory:  Deficit noted  Attention Span/ Concentration:  Easily distracted  Fund of Knowledge:  Appropriate for age and educational level  Intellectual Functioning:  Average range  Insight:  Fair  Judgement:  Fair  Reliability:  Fair  Impulse Control:  Fair       @RESULASTCBCDIFFPANEL,TSH,LABLIPI,PYJBNBIT35,WGCVLANB23,MG,FOLATE,PROLACTIN,CRPRESULT,CMP,N2LJOZYYRXE)@    Lab Results   Component Value Date    GLUCOSE 82 03/04/2025    BUN 16 03/04/2025    CREATININE 1.33 (H) 03/04/2025    EGFR 41.8 (L) 03/04/2025    BCR 12.0 03/04/2025    K 5.1 03/04/2025    CO2 21.3 (L) 03/04/2025    CALCIUM 10.2 03/04/2025    ALBUMIN 4.0 03/04/2025    BILITOT 0.3 07/22/2024    AST 28 07/22/2024    ALT 33 07/22/2024       Lab Results   Component Value Date    WBC 16.25 (H) 07/10/2024    HGB 12.2 07/10/2024    HCT 35.4 07/10/2024    MCV 89.6 07/10/2024     07/10/2024       Lab Results   Component Value Date    CHOL 269 (H) 12/18/2023    TRIG 201 (H) 12/18/2023    HDL 76 (H) 12/18/2023     (H) 12/18/2023       Assessment / Plan      Visit Diagnosis/Orders Placed This Visit:  Diagnoses and all orders for this visit:    1. Moderate episode of recurrent major depressive disorder (Primary)  -     Cariprazine HCl (VRAYLAR) 1.5 MG capsule capsule; Take 1 capsule by mouth Daily.  Dispense: 30 capsule; Refill: 1    2. Anxiety    3. Memory deficits       Assessment & Plan  1. Anxiety.  She reports persistent anxiety, rating it sporadically at a 6 out of 10. She has not noticed any significant changes with the decreased dose of Prozac from 40 mg to 20 mg.     2. Depression.  She reports a consistent feeling of depression, rating it at a 9 out of 10. She has been on  Effexor, Zoloft, and Strattera in the past, but none have been significantly effective. She is also encouraged to stay hydrated and take breaks if she feels dizzy during her upcoming trip.    3. Dizziness.  She reports experiencing dizziness and a sensation of her head swimming. She is advised to stay hydrated and take breaks if she feels dizzy during her upcoming trip.    4. Decreased appetite.  She reports a decreased appetite, eating only one meal a day. She is advised to try to eat small, frequent meals throughout the day to maintain her energy levels.      Functional Status: Moderate impairment     Prognosis: Fair with Ongoing Treatment     Impression/Formulation:  Patient appeared alert and oriented.  Patient is voluntarily requesting to continue outpatient psychiatric treatment at [facility] [address]  Patient is receptive to assistance with maintaining a stable lifestyle.  Patient presents with history of     ICD-10-CM ICD-9-CM   1. Moderate episode of recurrent major depressive disorder  F33.1 296.32   2. Anxiety  F41.9 300.00   3. Memory deficits  R41.3 780.93   .    Reviewed patient's previous provider notes. Reviewed most recent labs. Patient meets DSM V diagnostic criteria for diagnoses. Diagnoses may be updated as more information becomes available.       Treatment Plan:   Continue Wellbutrin 300mg PO qam  Discontinue Prozac   Initate vraylar 1.5mg PO every other day   3 boxes of Vraylar 1.5mg samples given   Has upcoming appointment with ENT and neurology.   Encouraged to pursue psychotherapy. Upcoming apt scheduled with Susana Mccartney LCSW  Follow-up in 6 weeks and as needed    Patient will continue supportive psychotherapy efforts and medications as indicated. Clinic will obtain release of information for current treatment team for continuity of care as needed. Patient will contact this office, call 911 or present to the nearest emergency room should suicidal or  homicidal ideations occur.  Discussed medication options and treatment plan of prescribed medication(s) as well as the risks, benefits, and potential side effects. Patient acknowledged and verbally consented to continue with current treatment plan and was educated on the importance of compliance with treatment and follow-up appointments.     All risks/benefits and side effects discussed with patient, including risk for weight gain, increased lipids, hyperprolactemia, EPS symptoms, including restlessness, muscle  stiffness or contraction of head, face, neck, trunk and limbs, and TD (which can be irreversible). Pt verbalizes understanding and consents to treatment with these medications.    Pt has no previous history of seizure or current eating disorder, which would be a contraindication for use. The possibility of activation with initiation was discussed and patient verbalizes understanding.     Quality Measures:   Never smoker    I advised Colleen Morgan of the risks of tobacco use.     Follow Up:   Return in about 6 weeks (around 4/21/2025).      Patient or patient representative verbalized consent for the use of Ambient Listening during the visit with  DANIEL Tsai for chart documentation. 3/11/2025  15:25 EDT    DANIEL Tsai  [facility] [address]

## 2025-03-11 ENCOUNTER — INFUSION (OUTPATIENT)
Dept: ONCOLOGY | Facility: HOSPITAL | Age: 75
End: 2025-03-11
Payer: MEDICARE

## 2025-03-11 VITALS
DIASTOLIC BLOOD PRESSURE: 63 MMHG | SYSTOLIC BLOOD PRESSURE: 106 MMHG | TEMPERATURE: 96.8 F | WEIGHT: 143.8 LBS | RESPIRATION RATE: 16 BRPM | HEART RATE: 74 BPM | BODY MASS INDEX: 26.29 KG/M2

## 2025-03-11 DIAGNOSIS — M81.0 AGE-RELATED OSTEOPOROSIS WITHOUT CURRENT PATHOLOGICAL FRACTURE: Primary | ICD-10-CM

## 2025-03-11 PROCEDURE — 96372 THER/PROPH/DIAG INJ SC/IM: CPT

## 2025-03-11 PROCEDURE — 25010000002 DENOSUMAB 60 MG/ML SOLUTION PREFILLED SYRINGE: Performed by: INTERNAL MEDICINE

## 2025-03-11 RX ADMIN — DENOSUMAB 60 MG: 60 INJECTION SUBCUTANEOUS at 11:32

## 2025-03-12 ENCOUNTER — HOSPITAL ENCOUNTER (OUTPATIENT)
Dept: ULTRASOUND IMAGING | Facility: HOSPITAL | Age: 75
Discharge: HOME | End: 2025-03-12
Payer: MEDICARE

## 2025-03-12 ENCOUNTER — HOSPITAL ENCOUNTER (OUTPATIENT)
Dept: MAMMOGRAPHY | Facility: HOSPITAL | Age: 75
Discharge: HOME | End: 2025-03-12
Payer: MEDICARE

## 2025-03-12 ENCOUNTER — TELEPHONE (OUTPATIENT)
Age: 75
End: 2025-03-12
Payer: MEDICARE

## 2025-03-12 DIAGNOSIS — R92.8 ABNORMAL MAMMOGRAM: ICD-10-CM

## 2025-03-12 PROCEDURE — 76642 ULTRASOUND BREAST LIMITED: CPT

## 2025-03-12 PROCEDURE — G0279 TOMOSYNTHESIS, MAMMO: HCPCS

## 2025-03-12 PROCEDURE — 77066 DX MAMMO INCL CAD BI: CPT

## 2025-03-12 NOTE — TELEPHONE ENCOUNTER
Discussed initiation of Vraylar and discontinuation of Prozac.  Patient and patient's  verbalized understanding

## 2025-03-19 ENCOUNTER — OFFICE VISIT (OUTPATIENT)
Age: 75
End: 2025-03-19
Payer: MEDICARE

## 2025-03-19 DIAGNOSIS — F33.2 SEVERE EPISODE OF RECURRENT MAJOR DEPRESSIVE DISORDER, WITHOUT PSYCHOTIC FEATURES: Primary | ICD-10-CM

## 2025-03-19 DIAGNOSIS — F41.1 GENERALIZED ANXIETY DISORDER: ICD-10-CM

## 2025-03-19 NOTE — PATIENT INSTRUCTIONS
"Verbal Safety Plan:     Continue to use learned coping skills and reach out to friends/family members/support network between appointments.     Contact Office at [phone], Call/Text [phone], call 911, Text \"HOME\" to [phone], and/or go to the nearest Hospital/ER in the event of a crisis.    "
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
## 2025-03-20 ENCOUNTER — OFFICE VISIT (OUTPATIENT)
Dept: ENDOCRINOLOGY | Facility: CLINIC | Age: 75
End: 2025-03-20
Payer: MEDICARE

## 2025-03-20 VITALS
HEIGHT: 62 IN | OXYGEN SATURATION: 97 % | BODY MASS INDEX: 26.5 KG/M2 | DIASTOLIC BLOOD PRESSURE: 60 MMHG | HEART RATE: 55 BPM | SYSTOLIC BLOOD PRESSURE: 140 MMHG | WEIGHT: 144 LBS

## 2025-03-20 DIAGNOSIS — M81.0 AGE-RELATED OSTEOPOROSIS WITHOUT CURRENT PATHOLOGICAL FRACTURE: ICD-10-CM

## 2025-03-20 DIAGNOSIS — E83.52 HYPERCALCEMIA: Primary | ICD-10-CM

## 2025-03-20 DIAGNOSIS — E04.2 MULTIPLE THYROID NODULES: ICD-10-CM

## 2025-03-20 NOTE — PATIENT INSTRUCTIONS
You will be due for a 24 hour urine collection in the coming weeks.   Please collect all urine over a 24 hour period. Keep cool/On ice. Once you complete the collection you will need blood work drawn at the time you drop off the jug.

## 2025-03-20 NOTE — PROGRESS NOTES
"Chief Complaint   Patient presents with    Abnormal Calcium    Hyperlipidemia    Hypertension     Hyperparathyroidism        HPI   Colleen Morgan is a 74 y.o. female had concerns including Abnormal Calcium, Hyperlipidemia, and Hypertension (Hyperparathyroidism).      Here for follow-up on hyperparathyroidism.  Last calcium level had improved.  Urine calcium was quite low and a recheck has been requested.    Since her last visit here she had an episode of severe weakness, was unable to stand and had some brain fog and confusion in January.  Was diagnosed with chronic UTI.  Blood pressure was running low.  Symptoms are improved.     The following portions of the patient's history were reviewed and updated as appropriate: allergies, current medications, past family history, past medical history, past social history, past surgical history, and problem list.    Review of Systems   Constitutional: Negative.    Endocrine: Negative.         /60 (BP Location: Left arm, Patient Position: Sitting, Cuff Size: Adult)   Pulse 55   Ht 157.5 cm (62\")   Wt 65.3 kg (144 lb)   SpO2 97%   BMI 26.34 kg/m²      Physical Exam  Vitals reviewed.   Constitutional:       Appearance: Normal appearance.   Cardiovascular:      Rate and Rhythm: Normal rate.   Pulmonary:      Effort: Pulmonary effort is normal.   Neurological:      General: No focal deficit present.      Mental Status: She is alert. Mental status is at baseline.   Psychiatric:         Mood and Affect: Mood normal.         Behavior: Behavior normal.              LABS AND IMAGING   CMP  Lab Results   Component Value Date    GLUCOSE 82 03/04/2025    BUN 16 03/04/2025    CREATININE 1.33 (H) 03/04/2025    EGFRIFNONA 72 08/04/2021    EGFRIFAFRI 83 08/04/2021    BCR 12.0 03/04/2025    K 5.1 03/04/2025    CO2 21.3 (L) 03/04/2025    CALCIUM 10.2 03/04/2025    ALBUMIN 4.0 03/04/2025    AST 28 07/22/2024    ALT 33 07/22/2024        CBC w/DIFF   Lab Results   Component Value " Date    WBC 16.25 (H) 07/10/2024    RBC 3.95 07/10/2024    HGB 12.2 07/10/2024    HCT 35.4 07/10/2024    MCV 89.6 07/10/2024    MCH 30.9 07/10/2024    MCHC 34.5 07/10/2024    RDW 12.9 07/10/2024    RDWSD 42.8 07/10/2024    MPV 10.2 07/10/2024     07/10/2024    NEUTRORELPCT 61.4 12/18/2023    LYMPHORELPCT 26.8 12/18/2023    MONORELPCT 10.3 12/18/2023    EOSRELPCT 0.0 (L) 12/18/2023    BASORELPCT 0.6 12/18/2023    AUTOIGPER 0.9 (H) 12/18/2023    NEUTROABS 12.03 (H) 07/10/2024    LYMPHSABS 1.84 12/18/2023    MONOSABS 0.71 12/18/2023    EOSABS 0.00 07/10/2024    BASOSABS 0.00 07/10/2024    AUTOIGNUM 0.06 (H) 12/18/2023    NRBC 0.0 12/18/2023     TSH  Lab Results   Component Value Date    TSH 1.600 07/10/2024    TSH 1.480 12/18/2023     Lab Results   Component Value Date     (H) 11/22/2024    .0 (H) 08/21/2024    PTH 87.0 (H) 12/18/2023    CAION 5.4 11/22/2024    CAION 1.53 (H) 08/21/2024    CAION 1.48 (H) 12/18/2023    ZEKR05LX 58.3 07/22/2024    JWOZ70QT 39.4 07/09/2024    KMMB69ZS 41.8 01/25/2024    DDKC54NU 49.6 12/18/2023    CALCIUMUR 1.4 11/22/2024    CAURTIMED 18 11/22/2024     US THYROID  Order: [id number]  Impression    IMPRESSION:     1. Inhomogeneous thyroid gland with bilateral nodules, minimally changed. Recommend follow-up study in one year.       Signed By: Wicho Lane MD  Narrative    EXAM: ULTRASOUND THYROID     INDICATION: Thyroid enlargement, thyroid nodules     COMPARISON: August 2019, August 2018     FINDINGS:     RIGHT LOBE:   Size: 4.2 x 1.5 x 1.6 cm.   Echotexture: Mildly inhomogeneous.   Nodules/cysts:   * 6 x 6 x 5 mm lesion, previously 7 x 6 x 5 and 7 x 6 x 6, mid right lobe, solid (2), hypoechoic (2), wider-than-tall (0), with ill-defined margins(0), with no calcifications (0).  TI-RADS TOTAL: 4-6 Points - TR4 Moderately Suspicious (FNA >= 1.5 cm, Follow >= 1cm, at 1, 2, 3, and 5 years).  No specific follow-up recommended.     *  4 x 4 by 4 mm lesion, previously 4 x 5 x  4 and 4 x 4 by 5, lower right lobe, solid (2), hypoechoic (2), wider-than-tall (0), with ill-defined margins(0), with no calcifications (0). TI-RADS TOTAL: 4-6 Points - TR4 Moderately Suspicious (FNA >= 1.5 cm, Follow >= 1cm, at 1, 2, 3, and 5 years).  No specific follow-up recommended.       LEFT LOBE:   Size: 5.0 x 1.5 x 1.5 cm.   Echotexture: Mildly inhomogeneous   Nodules/cysts:   * 4 x 3 x 5 mm lesion, previously 5 x 3 x 5 and 4 x 3 x 4, mid to lower left lobe, solid  (2), hypoechoic (2), wider-than-tall (0), with ill-defined margins(0), with no calcifications (0).  TI-RADS TOTAL: 4-6 Points - TR4 Moderately Suspicious (FNA >= 1.5 cm, Follow >= 1cm, at 1, 2, 3, and 5 years).  No specific follow-up recommended.     *  1.4 x 0.7 x 1.2 cm lesion, previously 1.2 x 0.6 x 1.1 and 1.2 x 0.8 x 1.1, lower left lobe, solid  (2), heterogeneously hypoechoic (2), wider-than-tall (0), with ill-defined margins(0), containing macrocalcifications (1).  TI-RADS TOTAL: 4-6 Points - TR4 Moderately Suspicious (FNA >= 1.5 cm, Follow >= 1cm, at 1, 2, 3, and 5 years).  Recommend follow-up study in one year.       ISTHMUS:   Thickness: 3 mm.   Nodules/cysts:   * 7 x 5 x 10 mm lesion, previously 8 x 4 x 6 2019 , leftward isthmus, solid (2), hypoechoic (2), wider-than-tall (0), with ill-defined margins(0), with large comet-tail or no calcifications (0).  TI-RADS TOTAL: 4-6 Points - TR4 Moderately Suspicious (FNA >= 1.5 cm, Follow >= 1cm, at 1, 2, 3, and 5 years).  Recommend follow-up study in one year.       OTHER FINDINGS/LYMPH NODES: None.  Exam End: 08/11/20 16:12    Specimen Collected: 08/11/20 16:54 Last Resulted: 08/11/20 17:05   Received From: [facility]    DEXA BONE DENSITY AXIAL  Order: [id number]  Impression    IMPRESSION:     1.The bone mineral density is  osteoporotic.        Secondary causes for low bone mass should be considered in patients with osteopenia and osteoporosis.  Patients with clinical history or  radiographic documented fragility fracture have presumed osteoporosis.  All treatments require clinical judgment.     WHO (World Health Organization) criteria for post-menopausal females and males over 50:     T-score = Standard deviation from young normal controls   Z-score = Standard deviation from age match controls     Normal = T-score more positive than -1   Osteopenia = T-score -1.1 to -2.4   Osteoporosis =T-score more negative than -2.5     NOF Recommendations:  The NOF recommends an adult under the age of 50 needs 1,000 mg of calcium and 400-800 IU of vitamin D daily.  Adults 50 and over need 1,200 mg calcium and 800-1,000 IU of vitamin D daily.  Effective therapies for the prevention of osteoporosis include bisphosphonates.      FRAX Version 3.08 (10 year fracture Risk Assessment):  According to NOF and ISCD FRAX applies to untreated postmenopausal women and men ages 40-90 with a hip T-score between -1.1 and -2.4. FRAX is highly dependent on established risk factors.  Risk factors not captured in FRAX model include: frailty, falls, vitamin D deficiency, increased bone turnover, interval significant decline in BMD.  Patients with a FRAX of greater than 3% in the hip and greater than 20% for major osteoporotic fracture   may  benefit from medical therapy for osteoporosis.     Signed By: Ernesto Jerry  Narrative    INDICATION:  Age-related osteoporosis.     COMPARISON:  DEXA scan dated 12/16/2020       REGION ASSESSED:     L Spine (L 1-4):  BMD=0.979 g/cm2, T-score=-0.6, Z-score=1.7, %Change =1.1%     L Hip:  BMD=0.762 g/cm2, T-score=-1.5, Z-score=0.2, %Change =1.3%   L Femoral Neck:  BMD=0.524 g/cm2, T-score=-2.9, Z-score=-0.9     R Hip:  BMD=0.787 g/cm2, T-score=-1.3, Z-score=0.4, %Change =-3.7%   R Femoral Neck:  BMD=0.560 g/cm2, T-score=-2.6, Z-score=-0.6     FRAX REPORT (WHO 10 Year Fracture Risk Assesment):     The FRAX score is not reported because one or more T-scores of the lumbar spine (total),  hip (total) and/or femoral neck are at or below -2.5.  Exam End: 23 14:56    Specimen Collected: 23 15:20 Last Resulted: 23 15:27   Received From: [facility]  Result Received: 23 14:41     Thyroid Ultrasound 25    Indication: thyroid nodules   Comparison Imagin2020  Clinical History: thyroid nodules, normal thyroid function    Real time high resolution imaging of the thyroid gland was performed in transverse and longitudinal planes. Previous imaging reports were reviewed if available and compared to the current to assess stability.     Lobes:  The right lobe measured 4.6 cm L x 1.7 cm AP x 1.6 cm in TV dimension.    The isthmus measured 0.5 cm in thickness.    The left thyroid lobe measured 5.2 cm L x 1.5 cm AP x 1.4 cm in TV dimension.    Thyroid gland is heterogeneous and contains multiple nodules.  No prominent parathyroid glands were noted.     Nodule 1   located in the left medial/isthmus lobe and measures 1.2 x 0.6 x 1.0 cm (L X AP X TV).  This nodule is solid, heterogeneous, isoechoic with indistinct margins, and Grade I vascularity on Color Flow Doppler.  Central calcification noted.    Nodule 2  is located in the right mid lobe and measures 0.6 x 0.5 x 0.5 cm (L X AP X TV).  This nodule is complex solid, heterogeneous, hypoechoic with well-defined margins.  It has no artifacts    No pathologic lymph nodes were seen.    Impression:  Heterogeneous gland with bilateral nodules.  The largest nodule in the left medial/isthmus lobe measures 1.2 cm.  This correlates to the nodule that previously measured 1.2 to 1.4 cm with dense central macrocalcification.      Recommendation:  Repeat ultrasound in 1 year.        Assessment and Plan    Diagnoses and all orders for this visit:    1. Hypercalcemia (Primary)  Workup is consistent with primary hyperparathyroidism though urine calcium was quite low - ratio 0.002 (suggestive of possible FHH), Urine volume was adequate.    She  has a remote history of nephrolithiasis (around 2003), osteoporosis, CKD 3.  Vitamin D levels have been in optimal range on supplement.  Recheck 24-hour urine calcium and repeat labs within the next month was advised.  She is agreeable for referral to surgery if needed (Dr. Watters).  With calcium improving and low urine calcium, defer for now.     2. Age-related osteoporosis without current pathological fracture  Diagnosed 2018.  BMD 2023 with lowest T-score -2.9 in the left femoral neck.  History of Boniva and Fosamax, not tolerated due to nausea.  She was transitioned to Prolia in 2024 and tolerating without issue.  This medication will be continued lifelong, no drug holiday as with bisphosphonates due to potential for rapid decline in BMD back to baseline or worse.  Next dose of Prolia due 9/11/2025.   BMD due 6/2025. Ordered.     3. Multiple thyroid nodules  -     US Thyroid  See the detailed ultrasound report as above.  Stable left/isthmus nodule.  Ultrasound report reviewed from 2020.  She has bilateral nodules, largest in the left lobe measuring 1.4 cm.  No history of FNA.  Update ultrasound at follow-up visit.       Return in about 6 months (around 9/20/2025) for next scheduled follow up. The patient was instructed to contact the clinic with any interval questions or concerns.    Electronically signed by: Ebonie Lilly DO   Endocrinologist    Please note that portions of this note were completed with a voice recognition program.

## 2025-03-26 ENCOUNTER — LAB (OUTPATIENT)
Dept: ENDOCRINOLOGY | Facility: CLINIC | Age: 75
End: 2025-03-26
Payer: MEDICARE

## 2025-03-26 DIAGNOSIS — E83.52 HYPERCALCEMIA: ICD-10-CM

## 2025-03-26 DIAGNOSIS — N39.0 RECURRENT UTI: ICD-10-CM

## 2025-03-26 PROCEDURE — 81050 URINALYSIS VOLUME MEASURE: CPT | Performed by: INTERNAL MEDICINE

## 2025-03-26 PROCEDURE — 82570 ASSAY OF URINE CREATININE: CPT | Performed by: INTERNAL MEDICINE

## 2025-03-26 PROCEDURE — 82340 ASSAY OF CALCIUM IN URINE: CPT | Performed by: INTERNAL MEDICINE

## 2025-03-27 LAB
CALCIUM 24H UR-MCNC: 5.5 MG/DL
CALCIUM 24H UR-MRATE: 71.5 MG/24 HR (ref 100–300)
COLLECT DURATION TIME UR: 24 HRS
COLLECT DURATION TIME UR: 24 HRS
CREAT UR-MCNC: 70.6 MG/DL
CREATINE 24H UR-MRATE: 0.92 G/24 HR (ref 0.7–1.6)
SPECIMEN VOL 24H UR: 1300 ML
SPECIMEN VOL 24H UR: 1300 ML

## 2025-04-10 ENCOUNTER — OFFICE VISIT (OUTPATIENT)
Age: 75
End: 2025-04-10
Payer: MEDICARE

## 2025-04-10 DIAGNOSIS — F33.2 SEVERE EPISODE OF RECURRENT MAJOR DEPRESSIVE DISORDER, WITHOUT PSYCHOTIC FEATURES: Primary | ICD-10-CM

## 2025-04-10 DIAGNOSIS — F41.1 GENERALIZED ANXIETY DISORDER: ICD-10-CM

## 2025-04-11 NOTE — PROGRESS NOTES
Progress Note     Date: 04/10/2025  Client Name: Colleen Morgan  MRN: [id number]  Start Time:    3:02 PM  End Time:    3:55 PM     Diagnoses and all orders for this visit:    1. Severe episode of recurrent major depressive disorder, without psychotic features (Primary)    2. Generalized anxiety disorder         Active Symptoms/Chief Complainant:   Anhedonia, decreased appetite, depressed mood, excessive worry, feeling on edge, excessive sleep, all or nothing thought patterns, social withdrawal         MENTAL STATUS EXAM   General Appearance:  Cleanly groomed and dressed  Eye Contact:  Good eye contact  Attitude:  Cooperative  Motor Activity:  Normal gait, posture  Speech:  Normal rate, tone, volume  Mood and affect:  Appropriate and mood congruent  Thought Process:  Logical and linear  Associations/ Thought Content:  No delusions  Hallucinations:  None  Suicidal Ideations:  Not present  Homicidal Ideation:  Not present  Sensorium:  Alert and clear  Orientation:  Person, place and time  Fund of Knowledge:  Appropriate for age and educational level  Insight:  Fair  Judgement:  Good  Reliability:  Good       Risk to self:  Low  No new reported incidents of SI and/or self    Risk others:  Low  No new reported incidents of HI and/or aggressive behavior    Progress Note Intervention/Client Response     Progress Note Intervention:     Met with client at [facility] for individual session.     Utilized MI techniques of OARS and providing empathy to explore current stressors and assess sx burden. Clinician continued to build rapport with client during session utilizing motivational interviewing and empathic listening. Utilized MI goal setting techniques to establish treatment goals and complete care plan in collaboration with client.     Utilized CBT reflective listening techniques in order to process interpersonal dynamics and changes in mood.  Provided psychoeducation on healthy boundary setting  with others.  Begin exploring healthy communication skills and healthy conflict resolution skills.  Encouraged client to use voice dictation and journaling ovi on her phone in order to process feelings in between sessions and take notes on topics to discuss during next session.      Client response:     Client was receptive to MI and CBT intervention. Client was able to identify goals for therapy and was actively engaged in treatment planning process.  Client reported since last session with clinician noticing an improvement in her mood due to changes in medication and transition to spring weather.  Client stated that she has attempted to journal, but has difficulty physically writing for long periods of time and agreed to try using voice dictation.  Client processed interpersonal dynamics with family members and engaged in discussion about healthy communication skills, healthy conflict resolution skills, and healthy boundary setting.  Client agreed to take notes on topics she would like to discuss during next session in order to continue processing healthy communication skills.    Client engaged in active discussion with therapist and appeared receptive to therapeutic intervention/clinician feedback.       Follow-up:    At next session, clinician will continue CBT intervention.    New plan of care was created and entered today with the client.  Too soon to assess any type of progress due to new plan being entered this date.        [facility] [address]

## 2025-04-11 NOTE — PLAN OF CARE
"Goal:     \" I'd like to quit sleeping and I'd like to have gumption.  I would like to have an interest in things.\"      Objective:    Over the next 90 days, I will learn and utilize 3 skills and techniques to reduce depressive and anxiety symptoms, as measured by Client reports and reductions in the PHQ-9 and BENIGNO 7.    Progress toward goal:  Not appropriate to rate progress toward goal since this is the initial treatment plan.  "

## 2025-04-18 ENCOUNTER — LAB (OUTPATIENT)
Dept: LAB | Facility: HOSPITAL | Age: 75
End: 2025-04-18
Payer: MEDICARE

## 2025-04-18 ENCOUNTER — OFFICE VISIT (OUTPATIENT)
Dept: FAMILY MEDICINE CLINIC | Facility: CLINIC | Age: 75
End: 2025-04-18
Payer: MEDICARE

## 2025-04-18 VITALS
SYSTOLIC BLOOD PRESSURE: 138 MMHG | HEART RATE: 63 BPM | OXYGEN SATURATION: 97 % | BODY MASS INDEX: 26.72 KG/M2 | WEIGHT: 145.2 LBS | DIASTOLIC BLOOD PRESSURE: 84 MMHG | HEIGHT: 62 IN

## 2025-04-18 DIAGNOSIS — N39.0 RECURRENT UTI: ICD-10-CM

## 2025-04-18 DIAGNOSIS — R26.81 GAIT INSTABILITY: ICD-10-CM

## 2025-04-18 DIAGNOSIS — I10 PRIMARY HYPERTENSION: ICD-10-CM

## 2025-04-18 DIAGNOSIS — R42 DIZZINESS: Primary | ICD-10-CM

## 2025-04-18 DIAGNOSIS — M25.511 CHRONIC RIGHT SHOULDER PAIN: ICD-10-CM

## 2025-04-18 DIAGNOSIS — R41.89 COGNITIVE DECLINE: ICD-10-CM

## 2025-04-18 DIAGNOSIS — R29.6 RECURRENT FALLS: ICD-10-CM

## 2025-04-18 DIAGNOSIS — G89.29 CHRONIC RIGHT SHOULDER PAIN: ICD-10-CM

## 2025-04-18 DIAGNOSIS — E83.52 HYPERCALCEMIA: ICD-10-CM

## 2025-04-18 DIAGNOSIS — R29.898 WEAKNESS OF BOTH LOWER EXTREMITIES: ICD-10-CM

## 2025-04-18 DIAGNOSIS — F33.1 MODERATE EPISODE OF RECURRENT MAJOR DEPRESSIVE DISORDER: ICD-10-CM

## 2025-04-18 DIAGNOSIS — L65.9 HAIR LOSS: ICD-10-CM

## 2025-04-18 DIAGNOSIS — R53.81 PHYSICAL DECONDITIONING: ICD-10-CM

## 2025-04-18 DIAGNOSIS — E78.2 MIXED HYPERLIPIDEMIA: ICD-10-CM

## 2025-04-18 LAB
ALBUMIN SERPL-MCNC: 4 G/DL (ref 3.5–5.2)
ANION GAP SERPL CALCULATED.3IONS-SCNC: 9.6 MMOL/L (ref 5–15)
BASOPHILS # BLD AUTO: 0.03 10*3/MM3 (ref 0–0.2)
BASOPHILS NFR BLD AUTO: 0.6 % (ref 0–1.5)
BUN SERPL-MCNC: 16 MG/DL (ref 8–23)
BUN/CREAT SERPL: 13.4 (ref 7–25)
CA-I SERPL ISE-MCNC: 1.39 MMOL/L (ref 1.15–1.35)
CALCIUM SPEC-SCNC: 9.9 MG/DL (ref 8.6–10.5)
CHLORIDE SERPL-SCNC: 104 MMOL/L (ref 98–107)
CHOLEST SERPL-MCNC: 202 MG/DL (ref 0–200)
CO2 SERPL-SCNC: 23.4 MMOL/L (ref 22–29)
CREAT SERPL-MCNC: 1.19 MG/DL (ref 0.57–1)
DEPRECATED RDW RBC AUTO: 42.2 FL (ref 37–54)
EGFRCR SERPLBLD CKD-EPI 2021: 47.8 ML/MIN/1.73
EOSINOPHIL # BLD AUTO: 0 10*3/MM3 (ref 0–0.4)
EOSINOPHIL NFR BLD AUTO: 0 % (ref 0.3–6.2)
ERYTHROCYTE [DISTWIDTH] IN BLOOD BY AUTOMATED COUNT: 12.3 % (ref 12.3–15.4)
GLUCOSE SERPL-MCNC: 85 MG/DL (ref 65–99)
HCT VFR BLD AUTO: 46 % (ref 34–46.6)
HDLC SERPL-MCNC: 87 MG/DL (ref 40–60)
HGB BLD-MCNC: 15.8 G/DL (ref 12–15.9)
IMM GRANULOCYTES # BLD AUTO: 0.03 10*3/MM3 (ref 0–0.05)
IMM GRANULOCYTES NFR BLD AUTO: 0.6 % (ref 0–0.5)
IRON 24H UR-MRATE: 142 MCG/DL (ref 37–145)
IRON SATN MFR SERPL: 37 % (ref 20–50)
LDLC SERPL CALC-MCNC: 93 MG/DL (ref 0–100)
LDLC/HDLC SERPL: 1.03 {RATIO}
LYMPHOCYTES # BLD AUTO: 1.1 10*3/MM3 (ref 0.7–3.1)
LYMPHOCYTES NFR BLD AUTO: 20.4 % (ref 19.6–45.3)
MCH RBC QN AUTO: 32.2 PG (ref 26.6–33)
MCHC RBC AUTO-ENTMCNC: 34.3 G/DL (ref 31.5–35.7)
MCV RBC AUTO: 93.9 FL (ref 79–97)
MONOCYTES # BLD AUTO: 0.57 10*3/MM3 (ref 0.1–0.9)
MONOCYTES NFR BLD AUTO: 10.6 % (ref 5–12)
NEUTROPHILS NFR BLD AUTO: 3.65 10*3/MM3 (ref 1.7–7)
NEUTROPHILS NFR BLD AUTO: 67.8 % (ref 42.7–76)
NRBC BLD AUTO-RTO: 0 /100 WBC (ref 0–0.2)
PHOSPHATE SERPL-MCNC: 2.6 MG/DL (ref 2.5–4.5)
PLATELET # BLD AUTO: 347 10*3/MM3 (ref 140–450)
PMV BLD AUTO: 10.3 FL (ref 6–12)
POTASSIUM SERPL-SCNC: 4.9 MMOL/L (ref 3.5–5.2)
RBC # BLD AUTO: 4.9 10*6/MM3 (ref 3.77–5.28)
SODIUM SERPL-SCNC: 137 MMOL/L (ref 136–145)
TIBC SERPL-MCNC: 386 MCG/DL (ref 298–536)
TRANSFERRIN SERPL-MCNC: 259 MG/DL (ref 200–360)
TRIGL SERPL-MCNC: 128 MG/DL (ref 0–150)
VLDLC SERPL-MCNC: 22 MG/DL (ref 5–40)
WBC NRBC COR # BLD AUTO: 5.38 10*3/MM3 (ref 3.4–10.8)

## 2025-04-18 PROCEDURE — 82330 ASSAY OF CALCIUM: CPT

## 2025-04-18 PROCEDURE — 80061 LIPID PANEL: CPT

## 2025-04-18 PROCEDURE — 83970 ASSAY OF PARATHORMONE: CPT

## 2025-04-18 PROCEDURE — 85025 COMPLETE CBC W/AUTO DIFF WBC: CPT

## 2025-04-18 PROCEDURE — 84466 ASSAY OF TRANSFERRIN: CPT

## 2025-04-18 PROCEDURE — 82746 ASSAY OF FOLIC ACID SERUM: CPT

## 2025-04-18 PROCEDURE — 80069 RENAL FUNCTION PANEL: CPT

## 2025-04-18 PROCEDURE — 82607 VITAMIN B-12: CPT

## 2025-04-18 PROCEDURE — 83540 ASSAY OF IRON: CPT

## 2025-04-18 PROCEDURE — 82306 VITAMIN D 25 HYDROXY: CPT

## 2025-04-18 NOTE — PROGRESS NOTES
Chief Complaint   Patient presents with    Hypertension    Dizziness       Colleen Morgan is a pleasant 74 y.o. female who is here for routine follow-up of dizziness and hypertension.  Patient is doing much better overall today.  She is sleeping less and her depression is improving.  She is followed by behavioral health who is managing her medications.  She stopped her and her blood pressure is stable and well-controlled without it.  She is monitoring it at home and has generally been well-controlled.  She is compliant on the amlodipine 5 mg and Bystolic 10 mg daily.  She does feel that some of her dizziness has improved.  She reports that she still feels unsteady on her feet and weak.  She has been walking more with her  who is present at appointment today.  She has ongoing right shoulder pain.  She was previously referred to physical therapy but abruptly discontinued it without completing the exercises.  She did feel that the physical therapy was helping and would like a new referral placed.    Past Medical History:   Diagnosis Date    ADHD (attention deficit hyperactivity disorder)     Allergic     Arthritis     Arthritis of back 2000    Arthritis of neck ?    Asthma     Bursitis of hip 2008    Chronic pain disorder     Arthritis degenerative disc disorder    Colon polyp     Coronary artery disease     Depression     Disease of thyroid gland     Actually parathyroid produces too much calcium    GERD (gastroesophageal reflux disease)     Headache     Heart murmur     Hip arthrosis     HL (hearing loss)     Hyperparathyroidism 2019    Noted by my cardiologist    Hypertension     Irritable bowel syndrome     Low back pain     Low back strain 2010    Neck strain 2001    Osteopenia     Osteoporosis     Periarthritis of shoulder 2024    Found in MRI of rotator cuff    PTSD (post-traumatic stress disorder) 10/2012    Rotator cuff syndrome     Tennis elbow 2000    Urinary tract infection     Visual  impairment     Vitamin D deficiency     Wrist sprain 2005    Resulted from falling       Past Surgical History:   Procedure Laterality Date    ADENOIDECTOMY  1956    With tonsilectomy    APPENDECTOMY  1968    CARDIAC CATHETERIZATION      CATARACT EXTRACTION Bilateral 2012    COLONOSCOPY  6/2:    Colonoscopy and upper GI    HYSTERECTOMY  1996    OOPHORECTOMY      SHOULDER ARTHROSCOPY Right 02/19/2024    arthroscopic rotator cuff repair, arthroscopic biceps tenodesis, arthroscopic extensive debridement, arthroscopic subacromial decompression with acromioplasty, Dr. Regan Vann    SHOULDER SURGERY  02/24    Right Rotator cuff, bicep trars    SINUS SURGERY      x 2    SKIN BIOPSY      Most of body but were not malignant    TONSILLECTOMY AND ADENOIDECTOMY      TUBAL ABDOMINAL LIGATION  1979       Family History   Problem Relation Age of Onset    Diabetes Mother     Arthritis Mother     Hypertension Mother     Osteoarthritis Mother     Hearing loss Mother     Osteoporosis Mother     Heart failure Father     Bipolar disorder Father     Alcohol abuse Father     Depression Father     Hearing loss Father     Hyperlipidemia Father     Hypertension Brother     Thyroid disease Brother     Anesthesia problems Brother     Clotting disorder Brother     Diabetes Brother     Anxiety disorder Daughter     Depression Daughter     Anxiety disorder Daughter     Depression Daughter     Developmental Disability Daughter     Learning disabilities Daughter     Breast cancer Neg Hx     Ovarian cancer Neg Hx        Social History     Socioeconomic History    Marital status:    Tobacco Use    Smoking status: Never     Passive exposure: Past    Smokeless tobacco: Never    Tobacco comments:     Tried a few times but quit   Vaping Use    Vaping status: Never Used   Substance and Sexual Activity    Alcohol use: Yes     Alcohol/week: 1.0 - 2.0 standard drink of alcohol     Types: 1 - 2 Glasses of wine per week     Comment: With dinner     "Drug use: Never    Sexual activity: Yes     Partners: Male     Birth control/protection: Post-menopausal, Tubal ligation, Hysterectomy       Allergies   Allergen Reactions    Ace Inhibitors Anaphylaxis and Other (See Comments)     Taken with avelox - throat swelling    Fluticasone Other (See Comments)     \"Increases heart rate, dizziness, felt like something is not right\"    Moxifloxacin Other (See Comments)     When taken with With lisinopril- throat swelling    Pravastatin Myalgia     Stopped per Dr Ernandez due to leg cramps severe       ROS  Review of Systems   Constitutional:  Positive for fatigue. Negative for chills and fever.   Respiratory:  Negative for cough, shortness of breath and wheezing.    Cardiovascular:  Negative for chest pain, palpitations and leg swelling.   Genitourinary:  Negative for dysuria.   Musculoskeletal:  Positive for gait problem.   Neurological:  Positive for dizziness and weakness. Negative for light-headedness and headache.   Psychiatric/Behavioral:  Positive for sleep disturbance and depressed mood.        Vitals:    04/18/25 1415   BP: 138/84   Pulse: 63   SpO2: 97%   Weight: 65.9 kg (145 lb 3.2 oz)   Height: 157.5 cm (62\")     Body mass index is 26.56 kg/m².           Current Outpatient Medications on File Prior to Visit   Medication Sig Dispense Refill    amLODIPine (NORVASC) 5 MG tablet Take 1 tablet by mouth Daily. 90 tablet 1    buPROPion XL (Wellbutrin XL) 300 MG 24 hr tablet Take 1 tablet by mouth Every Morning. 30 tablet 1    Cariprazine HCl (VRAYLAR) 1.5 MG capsule capsule Take 1 capsule by mouth Daily. 30 capsule 1    cephalexin (KEFLEX) 250 MG capsule Take 1 capsule by mouth Every Night. 90 capsule 1    Cholecalciferol 50 MCG (2000 UT) capsule Take  by mouth.      cycloSPORINE (Restasis) 0.05 % ophthalmic emulsion Apply 1 drop to eye(s) as directed by provider Every 12 (Twelve) Hours.      estradiol (ESTRACE) 0.1 MG/GM vaginal cream Insert 2 g into the vagina 2 (Two) " Times a Week. 42.5 g 2    levocetirizine (XYZAL) 5 MG tablet Take 1 tablet by mouth Every Evening. 90 tablet 3    magnesium oxide (MAG-OX) 400 MG tablet Take 1 tablet by mouth Daily. 90 tablet 1    nebivolol (BYSTOLIC) 10 MG tablet Take 1 tablet by mouth Daily. 90 tablet 1    pantoprazole (PROTONIX) 40 MG EC tablet Take 1 tablet by mouth Daily. 90 tablet 3    rosuvastatin (Crestor) 5 MG tablet Take 1 tablet by mouth Daily. (Patient taking differently: Take 1 tablet by mouth Daily. Refill through [facility]) 30 tablet 0     Current Facility-Administered Medications on File Prior to Visit   Medication Dose Route Frequency Provider Last Rate Last Admin    denosumab (PROLIA) syringe 60 mg  60 mg Subcutaneous Once Ebonie Lilly,            Results for orders placed or performed in visit on 03/26/25   Creatinine Urine 24 hour (kidney function) GFR component - Urine, Clean Catch    Collection Time: 03/26/25 12:02 PM    Specimen: Urine, Clean Catch; 24 Hour Urine   Result Value Ref Range    Creatinine, 24H 0.92 0.70 - 1.60 g/24 hr    Creatinine, Urine 70.6 mg/dL    24H Urine Volume 1,300 mL    Time (Hours) 24 hrs   Calcium, Urine, 24 Hour - Urine, Clean Catch    Collection Time: 03/26/25 12:02 PM    Specimen: Urine, Clean Catch; 24 Hour Urine   Result Value Ref Range    Calcium, 24H Urine 71.5 (L) 100.0 - 300.0 mg/24 hr    Calcium, Urine 5.5 mg/dL    24H Urine Volume 1,300 mL    Time (Hours) 24 hrs       PE    Physical Exam  Vitals reviewed.   Constitutional:       General: She is not in acute distress.     Appearance: Normal appearance. She is well-developed and normal weight. She is not ill-appearing or diaphoretic.   HENT:      Head: Normocephalic and atraumatic.   Eyes:      Extraocular Movements: Extraocular movements intact.      Conjunctiva/sclera: Conjunctivae normal.   Pulmonary:      Effort: Pulmonary effort is normal. No respiratory distress.   Musculoskeletal:         General: Normal range of  motion.      Cervical back: Normal range of motion.      Right lower leg: No edema.      Left lower leg: No edema.   Skin:     General: Skin is warm.      Findings: No erythema or rash.   Neurological:      General: No focal deficit present.      Mental Status: She is alert.      Comments: Gait instability and generalized weakness.   Psychiatric:         Attention and Perception: She is attentive.         Mood and Affect: Mood normal.         Speech: Speech normal.         Behavior: Behavior normal. Behavior is cooperative.         Thought Content: Thought content normal.         Judgment: Judgment normal.           A/P    Diagnoses and all orders for this visit:    1. Dizziness (Primary)  Improving compared to onset.  May still have some symptoms - difficult to ascertain from patient.  She will see how she feels this weekend and log it for her upcoming neurology appointment.    2. Primary hypertension  Stable, well-controlled.  Compliant on medication.    3. Weakness of both lower extremities  4. Gait instability  5. Physical deconditioning  6. Recurrent falls  -     Ambulatory Referral to Physical Therapy for Evaluation & Treatment    7. Chronic right shoulder pain  -     Ambulatory Referral to Physical Therapy for Evaluation & Treatment  Ongoing and worsening pain.  Return to PT to see if this helps with symptoms.  If pain does not improve, return to office for evaluation.    8. Moderate episode of recurrent major depressive disorder  Improving with treatment and medication.  Continue to follow-up with behavioral health.       Plan of care reviewed with patient at the conclusion of today's visit. Education was provided regarding diagnosis, management and any prescribed or recommended OTC medications.  Patient verbalizes understanding of and agreement with management plan.    Dictated Utilizing Dragon Dictation     Please note that portions of this note were completed with a voice recognition program.     Part of  this note may be an electronic transcription/translation of spoken language to printed text using the Dragon Dictation System.    Return in about 3 months (around 7/18/2025) for Recheck, 6 month follow-up.     Marsha Kraft PA-C

## 2025-04-19 LAB
25(OH)D3 SERPL-MCNC: 91 NG/ML (ref 30–100)
FOLATE SERPL-MCNC: 6.65 NG/ML (ref 4.78–24.2)
PTH-INTACT SERPL-MCNC: 258 PG/ML (ref 15–65)
VIT B12 BLD-MCNC: 231 PG/ML (ref 211–946)

## 2025-04-21 ENCOUNTER — OFFICE VISIT (OUTPATIENT)
Age: 75
End: 2025-04-21
Payer: MEDICARE

## 2025-04-21 VITALS
SYSTOLIC BLOOD PRESSURE: 130 MMHG | OXYGEN SATURATION: 97 % | HEIGHT: 62 IN | BODY MASS INDEX: 26.68 KG/M2 | DIASTOLIC BLOOD PRESSURE: 80 MMHG | WEIGHT: 145 LBS | HEART RATE: 72 BPM

## 2025-04-21 DIAGNOSIS — F41.9 ANXIETY: ICD-10-CM

## 2025-04-21 DIAGNOSIS — F33.1 MODERATE EPISODE OF RECURRENT MAJOR DEPRESSIVE DISORDER: Primary | ICD-10-CM

## 2025-04-21 DIAGNOSIS — R41.3 MEMORY DEFICITS: ICD-10-CM

## 2025-04-21 PROCEDURE — 1159F MED LIST DOCD IN RCRD: CPT

## 2025-04-21 PROCEDURE — 1160F RVW MEDS BY RX/DR IN RCRD: CPT

## 2025-04-21 PROCEDURE — 96127 BRIEF EMOTIONAL/BEHAV ASSMT: CPT

## 2025-04-21 PROCEDURE — 99214 OFFICE O/P EST MOD 30 MIN: CPT

## 2025-04-21 PROCEDURE — 3075F SYST BP GE 130 - 139MM HG: CPT

## 2025-04-21 PROCEDURE — 3079F DIAST BP 80-89 MM HG: CPT

## 2025-04-21 RX ORDER — BUPROPION HYDROCHLORIDE 300 MG/1
300 TABLET ORAL EVERY MORNING
Qty: 30 TABLET | Refills: 1 | Status: SHIPPED | OUTPATIENT
Start: 2025-04-21 | End: 2026-04-21

## 2025-04-21 NOTE — PROGRESS NOTES
Follow Up Office Visit      Patient Name: Colleen Morgan  : 1950   MRN: [id number]     Referring Provider: Marsha Kraft PA-C    Chief Complaint:      ICD-10-CM ICD-9-CM   1. Moderate episode of recurrent major depressive disorder  F33.1 296.32   2. Memory deficits  R41.3 780.93   3. Anxiety  F41.9 300.00        History of Present Illness:   Colleen Morgan is a 74 y.o. female who is here today for follow up and medication management.           Subjective      Patient Reports:   History of Present Illness  Her  Pino accompanied her throughout the entire visit.  Significant improvement in her condition is noted since the initiation of Vraylar, which has been taken every other day for the past 4 weeks. Sleep patterns have normalized, with bedtime around 10 or 11 PM and waking up between 7:30 and 8:00 AM, occasionally extending to 9 or 10 AM. An additional 1 to 2-hour nap is taken in the afternoon. Mood fluctuations are experienced but self-regulation and acknowledgment of these changes are reported. A particularly challenging day was noted yesterday, attributed to the absence of children and grandchildren during . Despite this, a good appetite is maintained, and no suicidal or self-harm ideations, hallucinations, homicidal thoughts, anger outbursts, lashing out, or panic attacks are endorsed. Anxiety is rated at a low level of 2 or 3, and depression at a moderate level of 5 or 6. No feelings of hopelessness or loneliness are reported. Therapy is found beneficial, and appreciation for having someone to confide in is expressed.    Difficulty with attention is reported, particularly when reading, watching TV, or walking, which has led to falls. An upcoming appointment with neurology is scheduled for tomorrow, and a previous brain scan has been conducted. Will continue to assess memory deficits versus difficulties with attention and focus.    MEDICATIONS  Vraylar    PHQ-9=  9 score from previous visit  BENIGNO-7= 5 score from previous visit    Review of Systems:   Review of Systems   Constitutional:  Negative for appetite change, fatigue and unexpected weight change.   Eyes:  Negative for visual disturbance.   Respiratory:  Negative for chest tightness and shortness of breath.    Cardiovascular:  Negative for chest pain.   Musculoskeletal:  Negative for gait problem.   Skin:  Negative for rash and wound.   Neurological:  Negative for dizziness, tremors, seizures, weakness, light-headedness and headaches.   Psychiatric/Behavioral:  Positive for confusion, decreased concentration and dysphoric mood. Negative for agitation, behavioral problems, hallucinations, self-injury, sleep disturbance and suicidal ideas. The patient is nervous/anxious. The patient is not hyperactive.    Sleep pattern: 10-12 hours per night  Appetite: normal     PHQ-9 Depression Screening  Little interest or pleasure in doing things? Several days   Feeling down, depressed, or hopeless? Several days   PHQ-2 Total Score 2   Trouble falling or staying asleep, or sleeping too much? Several days   Feeling tired or having little energy? Over half   Poor appetite or overeating? Not at all   Feeling bad about yourself - or that you are a failure or have let yourself or your family down? Several days   Trouble concentrating on things, such as reading the newspaper or watching television? Almost all   Moving or speaking so slowly that other people could have noticed? Or the opposite - being so fidgety or restless that you have been moving around a lot more than usual? Not at all   Thoughts that you would be better off dead, or of hurting yourself in some way? Not at all   PHQ-9 Total Score 9   If you checked off any problems, how difficult have these problems made it for you to do your work, take care of things at home, or get along with other people? Somewhat difficult       BENIGNO-7 Anxiety Screening  Over the last two weeks, how  often have you been bothered by the following problems?  Feeling nervous, anxious or on edge: Not at all  Not being able to stop or control worrying: Not at all  Worrying too much about different things: More than half the days  Trouble Relaxing: Not at all  Being so restless that it is hard to sit still: Several days  Becoming easily annoyed or irritable: Several days  Feeling afraid as if something awful might happen: Several days  BENIGNO 7 Total Score: 5  If you checked any problems, how difficult have these problems made it for you to do your work, take care of things at home, or get along with other people: Somewhat difficult    RISK ASSESSMENT:  Patient denies any thoughts or intent of suicide today. Patient denies any impulsive behavior today.     Medications:     Current Outpatient Medications:     amLODIPine (NORVASC) 5 MG tablet, Take 1 tablet by mouth Daily., Disp: 90 tablet, Rfl: 1    buPROPion XL (Wellbutrin XL) 300 MG 24 hr tablet, Take 1 tablet by mouth Every Morning., Disp: 30 tablet, Rfl: 1    Cariprazine HCl (VRAYLAR) 1.5 MG capsule capsule, Take 1 capsule 1.5mg every other day., Disp: 30 capsule, Rfl: 1    cephalexin (KEFLEX) 250 MG capsule, Take 1 capsule by mouth Every Night., Disp: 90 capsule, Rfl: 1    Cholecalciferol 50 MCG (2000 UT) capsule, Take  by mouth., Disp: , Rfl:     cycloSPORINE (Restasis) 0.05 % ophthalmic emulsion, Apply 1 drop to eye(s) as directed by provider Every 12 (Twelve) Hours., Disp: , Rfl:     estradiol (ESTRACE) 0.1 MG/GM vaginal cream, Insert 2 g into the vagina 2 (Two) Times a Week., Disp: 42.5 g, Rfl: 2    levocetirizine (XYZAL) 5 MG tablet, Take 1 tablet by mouth Every Evening., Disp: 90 tablet, Rfl: 3    magnesium oxide (MAG-OX) 400 MG tablet, Take 1 tablet by mouth Daily., Disp: 90 tablet, Rfl: 1    nebivolol (BYSTOLIC) 10 MG tablet, Take 1 tablet by mouth Daily., Disp: 90 tablet, Rfl: 1    pantoprazole (PROTONIX) 40 MG EC tablet, Take 1 tablet by mouth Daily., Disp:  "90 tablet, Rfl: 3    rosuvastatin (Crestor) 5 MG tablet, Take 1 tablet by mouth Daily. (Patient taking differently: Take 1 tablet by mouth Daily. Refill through [facility]), Disp: 30 tablet, Rfl: 0    Current Facility-Administered Medications:     denosumab (PROLIA) syringe 60 mg, 60 mg, Subcutaneous, Once, Ebonie Lilly, DO    Medication Considerations:  MATTHEW reviewed and appropriate.      Allergies:   Allergies   Allergen Reactions    Ace Inhibitors Anaphylaxis and Other (See Comments)     Taken with avelox - throat swelling    Fluticasone Other (See Comments)     \"Increases heart rate, dizziness, felt like something is not right\"    Moxifloxacin Other (See Comments)     When taken with With lisinopril- throat swelling    Pravastatin Myalgia     Stopped per Dr Ernandez due to leg cramps severe       Results Reviewed:   Yes     Objective     Physical Exam:  Vital Signs:   Vitals:    04/21/25 1535   BP: 130/80   Pulse: 72   SpO2: 97%   Weight: 65.8 kg (145 lb)   Height: 157.5 cm (62.01\")     Body mass index is 26.51 kg/m².     Mental Status Exam:   MENTAL STATUS EXAM   General Appearance:  Cleanly groomed and dressed and well developed  Eye Contact:  Good eye contact  Attitude:  Cooperative and polite  Motor Activity:  Normal gait, posture  Muscle Strength:  Normal  Speech:  Normal rate, tone, volume  Language:  Spontaneous  Mood and affect:  Depressed and anxious  Hopelessness:  Denies  Loneliness: Denies  Thought Process:  Logical  Associations/ Thought Content:  No delusions  Hallucinations:  None  Suicidal Ideations:  Not present  Homicidal Ideation:  Not present  Sensorium:  Alert and clear  Orientation:  Person, place, time and situation  Immediate Recall, Recent, and Remote Memory:  Deficit noted  Attention Span/ Concentration:  Easily distracted  Fund of Knowledge:  Appropriate for age and educational level  Intellectual Functioning:  Average range  Insight:  Fair  Judgement:  Fair  Reliability:  " Fair  Impulse Control:  Fair       @RESULASTCBCDIFFPANEL,TSH,LABLIPI,[id number],MG,FOLATE,PROLACTIN,CRPRESULT,CMP,[id number])@    Lab Results   Component Value Date    GLUCOSE 85 04/18/2025    BUN 16 04/18/2025    CREATININE 1.19 (H) 04/18/2025    EGFR 47.8 (L) 04/18/2025    BCR 13.4 04/18/2025    K 4.9 04/18/2025    CO2 23.4 04/18/2025    CALCIUM 9.9 04/18/2025    ALBUMIN 4.0 04/18/2025    BILITOT 0.3 07/22/2024    AST 28 07/22/2024    ALT 33 07/22/2024       Lab Results   Component Value Date    WBC 5.38 04/18/2025    HGB 15.8 04/18/2025    HCT 46.0 04/18/2025    MCV 93.9 04/18/2025     04/18/2025       Lab Results   Component Value Date    CHOL 202 (H) 04/18/2025    TRIG 128 04/18/2025    HDL 87 (H) 04/18/2025    LDL 93 04/18/2025       Assessment / Plan      Visit Diagnosis/Orders Placed This Visit:  Diagnoses and all orders for this visit:    1. Moderate episode of recurrent major depressive disorder (Primary)  -     Cariprazine HCl (VRAYLAR) 1.5 MG capsule capsule; Take 1 capsule 1.5mg every other day.  Dispense: 30 capsule; Refill: 1  -     buPROPion XL (Wellbutrin XL) 300 MG 24 hr tablet; Take 1 tablet by mouth Every Morning.  Dispense: 30 tablet; Refill: 1    2. Memory deficits    3. Anxiety  -     Cariprazine HCl (VRAYLAR) 1.5 MG capsule capsule; Take 1 capsule 1.5mg every other day.  Dispense: 30 capsule; Refill: 1         Assessment & Plan  The patient exhibits significant improvement in depressive symptoms since initiating Vraylar 1.5 mg every other day. She reports a more consistent sleep schedule, adequate food intake, and no severe down days, suicidal ideation, self-harm, hallucinations, or panic attacks. Although she experiences occasional moodiness, she can identify and manage these episodes. Her attention difficulties persist, impacting daily activities and leading to falls. The patient's overall mental health appears stable, with positive responses to the current treatment  regimen.        Functional Status: Moderate impairment     Prognosis: Good with Ongoing Treatment     Impression/Formulation:  Patient appeared alert and oriented.  Patient is voluntarily requesting to continue outpatient psychiatric treatment at [facility] [address].  Patient is receptive to assistance with maintaining a stable lifestyle.  Patient presents with history of     ICD-10-CM ICD-9-CM   1. Moderate episode of recurrent major depressive disorder  F33.1 296.32   2. Memory deficits  R41.3 780.93   3. Anxiety  F41.9 300.00   .    Reviewed patient's previous provider notes. Reviewed most recent labs. Patient meets DSM V diagnostic criteria for diagnoses. Diagnoses may be updated as more information becomes available.       Treatment Plan:   Continue Wellbutrin XL 300mg PO qam and Vraylar 1.5mg PO every other day  3 boxes of Vraylar 1.5mg samples given   Upcoming Apt with neurology scheduled.  Encouraged psychotherapy  Follow-up in 6 weeks and as needed    Patient will continue supportive psychotherapy efforts and medications as indicated. Clinic will obtain release of information for current treatment team for continuity of care as needed. Patient will contact this office, call 911 or present to the nearest emergency room should suicidal or homicidal ideations occur.  Discussed medication options and treatment plan of prescribed medication(s) as well as the risks, benefits, and potential side effects. Patient acknowledged and verbally consented to continue with current treatment plan and was educated on the importance of compliance with treatment and follow-up appointments.     Pt has no previous history of seizure or current eating disorder, which would be a contraindication for use. The possibility of activation with initiation was discussed and patient verbalizes understanding.    All risks/benefits and side effects discussed with patient, including risk for weight gain,  increased lipids, hyperprolactemia, EPS symptoms, including restlessness, muscle  stiffness or contraction of head, face, neck, trunk and limbs, and TD (which can be irreversible). Pt verbalizes understanding and consents to treatment with these medications.     Quality Measures:   Never smoker    I advised Colleen Morgan of the risks of tobacco use.     Follow Up:   Return in about 6 weeks (around 6/2/2025).      Patient or patient representative verbalized consent for the use of Ambient Listening during the visit with  DANIEL Tsai for chart documentation. 4/21/2025  20:19 EDT    DANIEL Tsai  [facility] [address] [facility] [address]

## 2025-04-22 ENCOUNTER — OFFICE VISIT (OUTPATIENT)
Dept: NEUROLOGY | Facility: CLINIC | Age: 75
End: 2025-04-22
Payer: MEDICARE

## 2025-04-22 VITALS — SYSTOLIC BLOOD PRESSURE: 136 MMHG | HEART RATE: 61 BPM | DIASTOLIC BLOOD PRESSURE: 80 MMHG | OXYGEN SATURATION: 98 %

## 2025-04-22 DIAGNOSIS — R41.89 COGNITIVE DECLINE: Primary | ICD-10-CM

## 2025-04-22 NOTE — PROGRESS NOTES
Neuro Office Visit      Encounter Date: 2025   Patient Name: Colleen Morgan  : 1950   MRN: [id number]   PCP:  Marsha Kraft PA-C     Chief Complaint:    Chief Complaint   Patient presents with    Cognitive decline       History of Present Illness: Colleen Morgan is a 74 y.o. female who is here today in Neurology for  new patient for cognitive decline    Initial clinic visit 2025:  PMH of moderate aortic valve regurgitation, CAD, moderate episode of recurrent major depressive disorder, injury of right rotator cuff, seasonal allergies, age-related osteoporosis, hypertension, allergic rhinitis, hyperparathyroidism, migraine, mixed hyperlipidemia, status post hysterectomy, status post right rotator cuff repair, duodenal ulcer disease, weakness of both lower extremities, gait instability, recurrent falls, cognitive decline    MRI brain brain performed on 2025 showed mild to moderate chronic and age-related changes.  No evidence of recent infarct, hemorrhage, mass or mass effect.  PCP did reduce olmesartan dose from 40 mg to 20 mg as blood pressure was low at her February PCP visit and there was concern that low blood pressure may be contributing to her dizziness and unsteady gait.  She does follow with psychiatry, reportedly was sleeping 20 hours a day at her visit in February with primary care.  At her visit on March 10 she reported some improvement in her dizziness and lightheadedness, blood pressure was 130/60 at that visit.  HCTZ was discontinued prior to that visit as well.  At her PCP visit on 2025 she reported that she was doing much better overall, sleeping less and depression is improving.  She is managing depression with behavioral health.  Dizziness has continued to improve.    Colleen Morgan is a 74 y.o. female who comes to clinic today for evaluation of memory loss . She is in clinic with her  today. She has noted symptoms since at least 3  years marked initially by forgetfulness . This has gradually worsened  over time. Additional symptoms have included impairments in short term memory . There have been associated  symptoms of frustration. She denies  impairments in ADL's, her  does the cooking. Her   manages her medications . She is no longer driving  since November 2024, she notes that her nightt vanessa vision is poor. She is currently residing at home with her .  Short term memory changes and will lose her train of thought while talking. She was told previously by a cardiologist that she had a mini stroke. Prior teacher and principal, she has 2 Master's degrees, she is now retired.     Prior Labs: Blood work from 4/18/2025 showed vitamin B12 231, folate 6.65.   7/10/2024 TSH was 1.600    She also had trouble walking up stairs, she was having significant difficulty with ambulation until March, PCP adjusted BP medications, no falls. She has not yet been to ENT, has not made the appointment yet. Dizziness has improved significantly. She reports that she is unsteady. Balance is improving.    Had significant depression for about 2 years, she is seeing behavioral health, Barbara SANCHEZ now, she would sleep 22 hours/day, she was very sedentary during that time. She had significant depression,  felt like she was walking around in a fog, they had seen significant improvement since seeing . She did have shuffling and leaning forward, they have seen marked improvement with increased activity. At one point her  was aiding with dressing/showering.     She was told about 20 years ago she had mini stroke by cardiologist - when she had a period of forgetfulness      Subjective      Review of Systems   Constitutional:  Positive for fatigue.   Eyes: Negative.    Respiratory: Negative.     Cardiovascular: Negative.    Gastrointestinal: Negative.    Musculoskeletal:  Positive for gait problem.   Neurological:  Positive for  dizziness.        Memory loss   Psychiatric/Behavioral:          Depression improving under the care of behavioral health          Past Medical History:   Past Medical History:   Diagnosis Date    ADHD (attention deficit hyperactivity disorder)     Allergic     Arthritis     Arthritis of back 2000    Arthritis of neck ?    Asthma     Bursitis of hip 2008    Chronic pain disorder     Arthritis degenerative disc disorder    Colon polyp     Coronary artery disease     Depression     Disease of thyroid gland     Actually parathyroid produces too much calcium    GERD (gastroesophageal reflux disease)     Headache     Heart murmur     Hip arthrosis     HL (hearing loss)     Hyperparathyroidism 2019    Noted by my cardiologist    Hypertension     Irritable bowel syndrome     Low back pain     Low back strain 2010    Neck strain 2001    Osteopenia     Osteoporosis     Periarthritis of shoulder 2024    Found in MRI of rotator cuff    PTSD (post-traumatic stress disorder) 10/2012    Rotator cuff syndrome     Tennis elbow 2000    Urinary tract infection     Visual impairment     Vitamin D deficiency     Wrist sprain 2005    Resulted from falling       Past Surgical History:   Past Surgical History:   Procedure Laterality Date    ADENOIDECTOMY  1956    With tonsilectomy    APPENDECTOMY  1968    CARDIAC CATHETERIZATION      CATARACT EXTRACTION Bilateral 2012    COLONOSCOPY  6/2:    Colonoscopy and upper GI    HYSTERECTOMY  1996    OOPHORECTOMY      SHOULDER ARTHROSCOPY Right 02/19/2024    arthroscopic rotator cuff repair, arthroscopic biceps tenodesis, arthroscopic extensive debridement, arthroscopic subacromial decompression with acromioplasty, Dr. Regan Vann    SHOULDER SURGERY  02/24    Right Rotator cuff, bicep trars    SINUS SURGERY      x 2    SKIN BIOPSY      Most of body but were not malignant    TONSILLECTOMY AND ADENOIDECTOMY      TUBAL ABDOMINAL LIGATION  1979       Family History:   Family History   Problem  Relation Age of Onset    Diabetes Mother     Arthritis Mother     Hypertension Mother     Osteoarthritis Mother     Hearing loss Mother     Osteoporosis Mother     Memory loss Mother     Heart failure Father     Bipolar disorder Father     Alcohol abuse Father     Depression Father     Hearing loss Father     Hyperlipidemia Father     Hypertension Brother     Thyroid disease Brother     Anesthesia problems Brother     Clotting disorder Brother     Diabetes Brother     Anxiety disorder Daughter     Depression Daughter     Anxiety disorder Daughter     Depression Daughter     Developmental Disability Daughter     Learning disabilities Daughter     Parkinson's disease Paternal Aunt     Alzheimer disease Paternal Aunt         undiagnosed, dx reported by daughter    ALS (amyotrophic lateral sclerosis) Paternal Aunt     Parkinson's disease Paternal Uncle     Multiple sclerosis Paternal Cousin     Breast cancer Neg Hx     Ovarian cancer Neg Hx        Social History:   Social History     Socioeconomic History    Marital status:    Tobacco Use    Smoking status: Never     Passive exposure: Past    Smokeless tobacco: Never    Tobacco comments:     Tried a few times but quit   Vaping Use    Vaping status: Never Used   Substance and Sexual Activity    Alcohol use: Yes     Alcohol/week: 1.0 - 2.0 standard drink of alcohol     Types: 1 - 2 Glasses of wine per week     Comment: With dinner    Drug use: Never    Sexual activity: Yes     Partners: Male     Birth control/protection: Post-menopausal, Tubal ligation, Hysterectomy       Medications:     Current Outpatient Medications:     amLODIPine (NORVASC) 5 MG tablet, Take 1 tablet by mouth Daily., Disp: 90 tablet, Rfl: 1    buPROPion XL (Wellbutrin XL) 300 MG 24 hr tablet, Take 1 tablet by mouth Every Morning., Disp: 30 tablet, Rfl: 1    Cariprazine HCl (VRAYLAR) 1.5 MG capsule capsule, Take 1 capsule 1.5mg every other day., Disp: 30 capsule, Rfl: 1    Cholecalciferol 50 MCG  "(2000 UT) capsule, Take  by mouth Daily., Disp: , Rfl:     cycloSPORINE (Restasis) 0.05 % ophthalmic emulsion, Apply 1 drop to eye(s) as directed by provider Every 12 (Twelve) Hours., Disp: , Rfl:     estradiol (ESTRACE) 0.1 MG/GM vaginal cream, Insert 2 g into the vagina 2 (Two) Times a Week., Disp: 42.5 g, Rfl: 2    levocetirizine (XYZAL) 5 MG tablet, Take 1 tablet by mouth Every Evening., Disp: 90 tablet, Rfl: 3    magnesium oxide (MAG-OX) 400 MG tablet, Take 1 tablet by mouth Daily., Disp: 90 tablet, Rfl: 1    nebivolol (BYSTOLIC) 10 MG tablet, Take 1 tablet by mouth Daily., Disp: 90 tablet, Rfl: 1    pantoprazole (PROTONIX) 40 MG EC tablet, Take 1 tablet by mouth Daily., Disp: 90 tablet, Rfl: 3    rosuvastatin (Crestor) 5 MG tablet, Take 1 tablet by mouth Daily. (Patient taking differently: Take 1 tablet by mouth Daily. Refill through [facility]), Disp: 30 tablet, Rfl: 0    cephalexin (KEFLEX) 250 MG capsule, Take 1 capsule by mouth Every Night. (Patient not taking: Reported on 4/22/2025), Disp: 90 capsule, Rfl: 1    Current Facility-Administered Medications:     denosumab (PROLIA) syringe 60 mg, 60 mg, Subcutaneous, Once, Ebonie Lilly,     Allergies:   Allergies   Allergen Reactions    Ace Inhibitors Anaphylaxis and Other (See Comments)     Taken with avelox - throat swelling    Fluticasone Other (See Comments)     \"Increases heart rate, dizziness, felt like something is not right\"    Moxifloxacin Other (See Comments)     When taken with With lisinopril- throat swelling    Pravastatin Myalgia     Stopped per Dr Ernandez due to leg cramps severe         STEADI Fall Risk Assessment was completed, and patient is at HIGH risk for falls. Assessment completed on:4/22/2025    Objective     Objective:    /80   Pulse 61   SpO2 98%   There is no height or weight on file to calculate BMI.    Physical Exam  Vitals reviewed.   Constitutional:       Appearance: Normal appearance.   HENT:      Head: " Normocephalic and atraumatic.      Mouth/Throat:      Mouth: Mucous membranes are moist.      Pharynx: Oropharynx is clear.   Pulmonary:      Effort: Pulmonary effort is normal. No respiratory distress.   Skin:     General: Skin is warm and dry.   Neurological:      Mental Status: She is alert.          Neurology Exam:    General apperance: NAD.     Mental status: Alert, awake and oriented to person, year, season, date, day, month, state, county, city, hospital. Disoriented to floor.     Fund of knowledge:  Mildly limited.     Language and Speech: No aphasia or dysarthria.    Naming , Repetition and Comprehension:  Can name objects, repeat a sentence and follow commands. She did have some fluency issues intermittently with sentences.    Cranial Nerves:   CN II: Visual fields are full. Intact. Pupils - PERRLA  CN III, IV and VI: Extraocular movements are intact. Normal saccades.   CN V: Facial sensation is intact.   CN VII: Muscles of facial expression reveal no asymmetry. Intact.   CN VIII: Hearing is intact.  CN IX and X: Palate elevates symmetrically. Intact  CN XI: Shoulder shrug is intact.   CN XII: Tongue is midline without evidence of atrophy or fasciculation.     Motor:  Right UE muscle strength 5/5. Normal tone.     Left UE muscle strength 5/5. Normal tone.      Right LE muscle strength 5/5. Normal tone.     Left LE muscle strength 5/5. Normal tone.      No resting tremor of BUE  No action tremor of BUE    Sensory: Normal light touch sensation bilaterally.    DTRs: 2+ bilaterally in upper and lower extremities.    Coordination: Normal finger-to-nose, heel to shin B/L.    Gait: Normal.    MMSE 28/30, recall 3/3      Results:   Imaging:   Mammo Diagnostic Digital Tomosynthesis Bilateral With CAD  Result Date: 3/12/2025  BI-RADS 3, probably benign findings.  RECOMMENDATION: 6-month follow-up left diagnostic mammogram and left breast ultrasound is advised for the probably benign mass in the left breast at 5:00,  6 cm from the nipple.  The standard false-negative rate of mammography is between 10% and 25%. Complex patterns or increased breast density will markedly elevate the false-negative rate of mammography.   A results letter, in lay terminology, will be given to the patient at the conclusion of the exam.  _____________________________________________________ Physician Order  Diagnostic Mammogram with Breast Ultrasound if needed.  Diagnosis:   Abnormal Mammogram  3/12/2025 3:21 PM by Dr. Jose Alfredo Darling MD on Workstation: [id number]      US Breast Left Limited  Result Date: 3/12/2025  BI-RADS 3, probably benign findings.  RECOMMENDATION: 6-month follow-up left diagnostic mammogram and left breast ultrasound is advised for the probably benign mass in the left breast at 5:00, 6 cm from the nipple.  The standard false-negative rate of mammography is between 10% and 25%. Complex patterns or increased breast density will markedly elevate the false-negative rate of mammography.   A results letter, in lay terminology, will be given to the patient at the conclusion of the exam.  _____________________________________________________ Physician Order  Diagnostic Mammogram with Breast Ultrasound if needed.  Diagnosis:   Abnormal Mammogram  3/12/2025 3:21 PM by Dr. Jose Alfredo Darling MD on Workstation: [id number]      MRI Brain Without Contrast  Result Date: 2/21/2025  Impression: Mild to moderate chronic and age-related changes are noted as above. There is otherwise no evidence of recent infarct, hemorrhage, mass or mass effect. Electronically Signed: Fredy Mcclain MD  2/21/2025 5:47 PM EST  Workstation ID: [id number]       Labs:   Lab Results   Component Value Date    GLUCOSE 85 04/18/2025    BUN 16 04/18/2025    CREATININE 1.19 (H) 04/18/2025     04/18/2025    K 4.9 04/18/2025     04/18/2025    CALCIUM 9.9 04/18/2025    PROTEINTOT 7.1 07/22/2024    ALBUMIN 4.0 04/18/2025    ALT 33 07/22/2024    AST 28 07/22/2024    ALKPHOS 102  07/22/2024    BILITOT 0.3 07/22/2024    GLOB 3.1 07/22/2024    AGRATIO 1.3 07/22/2024    BCR 13.4 04/18/2025    ANIONGAP 9.6 04/18/2025    EGFR 47.8 (L) 04/18/2025         Assessment / Plan      Assessment/Plan:   Diagnoses and all orders for this visit:    1. Cognitive decline (Primary)       Colleen Morgan is in neurology clinic to establish care for cognitive decline and dizziness. MRI brain brain performed on 2/21/2025 showed mild to moderate chronic and age-related changes.  No evidence of recent infarct, hemorrhage, mass or mass effect. Blood work from 4/18/2025 showed vitamin B12 231, folate 6.65.   7/10/2024 TSH was 1.600. She is accompanied by her  today who endorses significant improvement in correlation with adjustment in her blood pressure medications and with optimizing her psychiatric medications.  He reports that at one point she was not getting out of bed for significant sections of the day and that she required assistance with bathing and dressing, she reports that one point her gait was so unsteady that she was not able to walk unassisted, she has seen significant improvement in gait, balance, dizziness, mood, and moderate improvement in cognition and ability to perform her ADLs.  MMSE today was reassuring with 28 out of 30, 3 out of 3 for word recall, given her education history she would benefit from MoCA at her follow-up appointment to further assess for cognitive impairment.  We did discuss that her vitamin B12 was on the low end when checked before, I did advise that she start taking vitamin B12 1000 mcg daily, we will plan on rechecking this along with methylmalonic acid at her follow-up appointment.  Based on patient report and 's report along with review of prior records and cognitive testing today it does appear that much of her cognitive impairment was coming from significant depression, low B12 may have also been contributing.  However we will continue to monitor from a  neurologic perspective as well.  I did also offer consideration for speech therapy as she does still feel that she has difficulty with sentence structure at times, she did not wish to proceed with speech therapy at this time.  They also plan on additionally discussing with her behavioral health APRN regarding consideration for ADD.  She has also not yet been to ENT, I did advise that this would be important for continued assessment of positional vertigo as she does still notes some mild dizziness with position changes. Will plan to see back in clinic in 8 weeks or sooner for any concerns.       Patient Education:       Reviewed medications, potential side effects and signs and symptoms to report. Discussed risk versus benefits of treatment plan with patient and/or family-including medications, labs and radiology that may be ordered. Addressed questions and concerns during visit. Patient and/or family verbalized understanding and agree with plan. Instructed to call the office with any questions.    Follow Up:   Return in about 8 weeks (around 6/17/2025).    I spent 45 minutes in the care of this patient. I personally spent 50 percent of this time counseling and discussing diagnosis, diagnostic testing, evaluation, treatment options, and management .       During this visit the following were done:  Labs Reviewed [x]    Labs Ordered []    Radiology Reports Reviewed [x]    Radiology Ordered []    PCP Records Reviewed [x]    Referring Provider Records Reviewed []    ER Records Reviewed []    Hospital Records Reviewed []    History Obtained From Family []    Radiology Images Reviewed [x]    Other Reviewed [x]    Records Requested []      DANIEL Weber  [facility]  [address] [facility] [address]  [facility] [address] [phone]

## 2025-05-03 DIAGNOSIS — F33.1 MODERATE EPISODE OF RECURRENT MAJOR DEPRESSIVE DISORDER: ICD-10-CM

## 2025-05-03 DIAGNOSIS — N39.0 RECURRENT UTI: ICD-10-CM

## 2025-05-03 DIAGNOSIS — J30.2 SEASONAL ALLERGIES: ICD-10-CM

## 2025-05-05 DIAGNOSIS — F33.1 MODERATE EPISODE OF RECURRENT MAJOR DEPRESSIVE DISORDER: ICD-10-CM

## 2025-05-05 RX ORDER — BUPROPION HYDROCHLORIDE 300 MG/1
300 TABLET ORAL EVERY MORNING
Qty: 30 TABLET | Refills: 1 | Status: SHIPPED | OUTPATIENT
Start: 2025-05-05 | End: 2025-05-05 | Stop reason: SDUPTHER

## 2025-05-05 NOTE — TELEPHONE ENCOUNTER
Rx Refill Note  Requested Prescriptions     Pending Prescriptions Disp Refills    buPROPion XL (Wellbutrin XL) 300 MG 24 hr tablet 30 tablet 1     Sig: Take 1 tablet by mouth Every Morning.      Last office visit with prescribing clinician: 4/21/2025   Last telemedicine visit with prescribing clinician: Visit date not found   Next office visit with prescribing clinician: 5/21/2025                         Would you like a call back once the refill request has been completed: [] Yes [x] No    If the office needs to give you a call back, can they leave a voicemail: [x] Yes [] No    Viviana Salcido MA  05/05/25, 07:53 EDT

## 2025-05-05 NOTE — TELEPHONE ENCOUNTER
Rx Refill Note  Requested Prescriptions     Pending Prescriptions Disp Refills    cephalexin (KEFLEX) 250 MG capsule 90 capsule 1     Sig: Take 1 capsule by mouth Every Night.      Last office visit with prescribing clinician: 9/17/2024   Next office visit with prescribing clinician: 5/6/2025       Divya Mejias MA  05/05/25, 07:43 EDT

## 2025-05-06 ENCOUNTER — TELEPHONE (OUTPATIENT)
Dept: INTERNAL MEDICINE | Facility: CLINIC | Age: 75
End: 2025-05-06
Payer: MEDICARE

## 2025-05-06 ENCOUNTER — OFFICE VISIT (OUTPATIENT)
Dept: UROLOGY | Facility: CLINIC | Age: 75
End: 2025-05-06
Payer: MEDICARE

## 2025-05-06 VITALS — SYSTOLIC BLOOD PRESSURE: 164 MMHG | DIASTOLIC BLOOD PRESSURE: 79 MMHG | HEART RATE: 50 BPM | OXYGEN SATURATION: 96 %

## 2025-05-06 DIAGNOSIS — N39.0 RECURRENT UTI: Primary | ICD-10-CM

## 2025-05-06 DIAGNOSIS — Z87.442 HISTORY OF NEPHROLITHIASIS: ICD-10-CM

## 2025-05-06 DIAGNOSIS — N32.81 OVERACTIVE BLADDER: ICD-10-CM

## 2025-05-06 DIAGNOSIS — N95.2 VAGINAL ATROPHY: ICD-10-CM

## 2025-05-06 LAB
BILIRUB BLD-MCNC: NEGATIVE MG/DL
CLARITY, POC: CLEAR
COLOR UR: YELLOW
EXPIRATION DATE: NORMAL
GLUCOSE UR STRIP-MCNC: NEGATIVE MG/DL
KETONES UR QL: NEGATIVE
LEUKOCYTE EST, POC: NEGATIVE
Lab: NORMAL
NITRITE UR-MCNC: NEGATIVE MG/ML
PH UR: 6 [PH] (ref 5–8)
PROT UR STRIP-MCNC: NEGATIVE MG/DL
RBC # UR STRIP: NEGATIVE /UL
SP GR UR: 1.03 (ref 1–1.03)
UROBILINOGEN UR QL: NORMAL

## 2025-05-06 PROCEDURE — 3078F DIAST BP <80 MM HG: CPT | Performed by: NURSE PRACTITIONER

## 2025-05-06 PROCEDURE — 99213 OFFICE O/P EST LOW 20 MIN: CPT | Performed by: NURSE PRACTITIONER

## 2025-05-06 PROCEDURE — 1160F RVW MEDS BY RX/DR IN RCRD: CPT | Performed by: NURSE PRACTITIONER

## 2025-05-06 PROCEDURE — 81003 URINALYSIS AUTO W/O SCOPE: CPT | Performed by: NURSE PRACTITIONER

## 2025-05-06 PROCEDURE — 3077F SYST BP >= 140 MM HG: CPT | Performed by: NURSE PRACTITIONER

## 2025-05-06 PROCEDURE — 1159F MED LIST DOCD IN RCRD: CPT | Performed by: NURSE PRACTITIONER

## 2025-05-06 RX ORDER — BUPROPION HYDROCHLORIDE 300 MG/1
300 TABLET ORAL EVERY MORNING
Qty: 30 TABLET | Refills: 1 | Status: SHIPPED | OUTPATIENT
Start: 2025-05-06 | End: 2026-05-06

## 2025-05-06 RX ORDER — ESTRADIOL 0.1 MG/G
2 CREAM VAGINAL 3 TIMES WEEKLY
Qty: 42.5 G | Refills: 2 | Status: SHIPPED | OUTPATIENT
Start: 2025-05-07

## 2025-05-06 RX ORDER — LEVOCETIRIZINE DIHYDROCHLORIDE 5 MG/1
5 TABLET, FILM COATED ORAL EVERY EVENING
Qty: 90 TABLET | Refills: 3 | OUTPATIENT
Start: 2025-05-06

## 2025-05-06 NOTE — TELEPHONE ENCOUNTER
Rx Refill Note  Requested Prescriptions     Pending Prescriptions Disp Refills    buPROPion XL (Wellbutrin XL) 300 MG 24 hr tablet 30 tablet 1     Sig: Take 1 tablet by mouth Every Morning.      Last office visit with prescribing clinician: 4/21/2025   Last telemedicine visit with prescribing clinician: Visit date not found   Next office visit with prescribing clinician: 5/21/2025                         Would you like a call back once the refill request has been completed: [] Yes [x] No    If the office needs to give you a call back, can they leave a voicemail: [x] Yes [] No    Viviana Salcido MA  05/06/25, 07:57 EDT

## 2025-05-06 NOTE — PROGRESS NOTES
UTI Office Visit      Patient Name: Colleen Morgan  : 1950   MRN: [id number]     Chief Complaint:  UTI   Chief Complaint   Patient presents with    Nephrolithiasis     Hx of stones    Recurrent UTI       Referring Provider: No ref. provider found    History of Present Illness: Colleen Morgan is a 74 y.o. who presents today for history of recurrent UTI and nephrolithiasis.  On nightly Keflex since .  She denies any UTI since initiating nightly Keflex.  She is feeling well today.  Patient's  is with her and helps to provide some of the history.  She is using vaginal Estrace cream.  She ran out of her nightly Keflex 2 days ago and patient's  requests a refill.    She reports nocturia x2-3 and occasional urge incontinence. She has been trying to discontinue fluids before bedtime and has been decreasing caffeine.     Urinalysis negative for blood or infection.     Subjective      Review of System: Review of Systems   Genitourinary:  Positive for urgency.        Nocturia   All other systems reviewed and are negative.     I have reviewed the ROS documented by my clinical staff, I have updated appropriately and I agree. DANIEL Guerrero    Past Medical History:  Past Medical History:   Diagnosis Date    ADHD (attention deficit hyperactivity disorder)     Allergic     Arthritis     Arthritis of back     Arthritis of neck ?    Asthma     Bursitis of hip     Chronic pain disorder     Arthritis degenerative disc disorder    Colon polyp     Coronary artery disease     Depression     Disease of thyroid gland     Actually parathyroid produces too much calcium    GERD (gastroesophageal reflux disease)     Headache     Heart murmur     Hip arthrosis     HL (hearing loss)     Hyperparathyroidism 2019    Noted by my cardiologist    Hypertension     Irritable bowel syndrome     Low back pain     Low back strain 2010    Neck strain     Osteopenia     Osteoporosis      Periarthritis of shoulder 2024    Found in MRI of rotator cuff    PTSD (post-traumatic stress disorder) 10/2012    Rotator cuff syndrome     Tennis elbow 2000    Urinary tract infection     Visual impairment     Vitamin D deficiency     Wrist sprain 2005    Resulted from falling       Past Surgical History:  Past Surgical History:   Procedure Laterality Date    ADENOIDECTOMY  1956    With tonsilectomy    APPENDECTOMY  1968    CARDIAC CATHETERIZATION      CATARACT EXTRACTION Bilateral 2012    COLONOSCOPY  6/2:    Colonoscopy and upper GI    HYSTERECTOMY  1996    OOPHORECTOMY      SHOULDER ARTHROSCOPY Right 02/19/2024    arthroscopic rotator cuff repair, arthroscopic biceps tenodesis, arthroscopic extensive debridement, arthroscopic subacromial decompression with acromioplasty, Dr. Regan Vann    SHOULDER SURGERY  02/24    Right Rotator cuff, bicep trars    SINUS SURGERY      x 2    SKIN BIOPSY      Most of body but were not malignant    TONSILLECTOMY AND ADENOIDECTOMY      TUBAL ABDOMINAL LIGATION  1979       Medications:    Current Outpatient Medications:     amLODIPine (NORVASC) 5 MG tablet, Take 1 tablet by mouth Daily., Disp: 90 tablet, Rfl: 1    buPROPion XL (Wellbutrin XL) 300 MG 24 hr tablet, Take 1 tablet by mouth Every Morning., Disp: 30 tablet, Rfl: 1    Cariprazine HCl (VRAYLAR) 1.5 MG capsule capsule, Take 1 capsule 1.5mg every other day., Disp: 30 capsule, Rfl: 1    cephalexin (KEFLEX) 250 MG capsule, Take 1 capsule by mouth Every Night., Disp: 90 capsule, Rfl: 1    Cholecalciferol 50 MCG (2000 UT) capsule, Take  by mouth Daily., Disp: , Rfl:     cycloSPORINE (Restasis) 0.05 % ophthalmic emulsion, Apply 1 drop to eye(s) as directed by provider Every 12 (Twelve) Hours., Disp: , Rfl:     [START ON 5/7/2025] estradiol (ESTRACE) 0.1 MG/GM vaginal cream, Insert 2 g into the vagina 3 (Three) Times a Week., Disp: 42.5 g, Rfl: 2    levocetirizine (XYZAL) 5 MG tablet, Take 1 tablet by mouth Every Evening., Disp:  "90 tablet, Rfl: 3    magnesium oxide (MAG-OX) 400 MG tablet, Take 1 tablet by mouth Daily., Disp: 90 tablet, Rfl: 1    nebivolol (BYSTOLIC) 10 MG tablet, Take 1 tablet by mouth Daily., Disp: 90 tablet, Rfl: 1    pantoprazole (PROTONIX) 40 MG EC tablet, Take 1 tablet by mouth Daily., Disp: 90 tablet, Rfl: 3    rosuvastatin (Crestor) 5 MG tablet, Take 1 tablet by mouth Daily. (Patient taking differently: Take 1 tablet by mouth Daily. Refill through [facility]), Disp: 30 tablet, Rfl: 0    Current Facility-Administered Medications:     denosumab (PROLIA) syringe 60 mg, 60 mg, Subcutaneous, Once, Ebonie Lilly DO    Allergies:  Allergies   Allergen Reactions    Ace Inhibitors Anaphylaxis and Other (See Comments)     Taken with avelox - throat swelling    Fluticasone Other (See Comments)     \"Increases heart rate, dizziness, felt like something is not right\"    Moxifloxacin Other (See Comments)     When taken with With lisinopril- throat swelling    Pravastatin Myalgia     Stopped per Dr Ernandez due to leg cramps severe       Social History:  Social History     Socioeconomic History    Marital status:    Tobacco Use    Smoking status: Never     Passive exposure: Past    Smokeless tobacco: Never    Tobacco comments:     Tried a few times but quit   Vaping Use    Vaping status: Never Used   Substance and Sexual Activity    Alcohol use: Yes     Alcohol/week: 1.0 - 2.0 standard drink of alcohol     Types: 1 - 2 Glasses of wine per week     Comment: With dinner    Drug use: Never    Sexual activity: Yes     Partners: Male     Birth control/protection: Post-menopausal, Tubal ligation, Hysterectomy       Family History:  Family History   Problem Relation Age of Onset    Diabetes Mother     Arthritis Mother     Hypertension Mother     Osteoarthritis Mother     Hearing loss Mother     Osteoporosis Mother     Memory loss Mother     Heart failure Father     Bipolar disorder Father     Alcohol abuse Father     " Depression Father     Hearing loss Father     Hyperlipidemia Father     Hypertension Brother     Thyroid disease Brother     Anesthesia problems Brother     Clotting disorder Brother     Diabetes Brother     Anxiety disorder Daughter     Depression Daughter     Anxiety disorder Daughter     Depression Daughter     Developmental Disability Daughter     Learning disabilities Daughter     Parkinson's disease Paternal Aunt     Alzheimer disease Paternal Aunt         undiagnosed, dx reported by daughter    ALS (amyotrophic lateral sclerosis) Paternal Aunt     Parkinson's disease Paternal Uncle     Multiple sclerosis Paternal Cousin     Breast cancer Neg Hx     Ovarian cancer Neg Hx      Bladder & Bowel Symptom Questionnaire    How often do you usually urinate during the day ?   1 - About every 3-4 hours   2.   How many timed do you urinate at night?   3 - 4 times at night   3.   What is the reason that you usually urinate?   1 - Mild urge (can delay over an hour)   4.   Once you get the urge to go, how long can you     comfortably delay?   2 - 10-30 min   5.   How often do you get a sudden urge that makes you rush to the bathroom?   2 - A few times a month   6.   How often does a sudden urge to urinate result in you leaking urine or wetting pads?   2 - A few times a month   7.  In your opinion, how good is your bladder control?   2 - Good   8.  Do you have accidental bowel leakage?   no   9.  Do you have difficulty fully emptying your bladder?   no   10.  Do you experience accidental leakage when performing some physical activity such as coughing, sneezing, laughing or exercise?   no   11. Have you tried medications to help improve your symptoms?   no   12. Would you be interested in learning about a long-lasting option that may help you with your symptoms?   no                                                                             Total Score   13     0-7 (Mild) 8-16 (Moderate) 17-28 (Severe)        Objective      Physical Exam:   Vital Signs:   Vitals:    05/06/25 1308   BP: 164/79   Pulse: 50   SpO2: 96%     There is no height or weight on file to calculate BMI.     Physical Exam  Vitals and nursing note reviewed.   Constitutional:       Appearance: Normal appearance.   HENT:      Head: Normocephalic and atraumatic.      Nose: Nose normal.      Mouth/Throat:      Mouth: Mucous membranes are moist.   Eyes:      Pupils: Pupils are equal, round, and reactive to light.   Pulmonary:      Effort: Pulmonary effort is normal.   Abdominal:      General: Abdomen is flat.      Palpations: Abdomen is soft.   Musculoskeletal:         General: Normal range of motion.      Cervical back: Normal range of motion.   Skin:     General: Skin is warm and dry.      Capillary Refill: Capillary refill takes less than 2 seconds.   Neurological:      General: No focal deficit present.      Mental Status: She is alert.   Psychiatric:         Mood and Affect: Mood normal.       Labs:   Brief Urine Lab Results  (Last result in the past 365 days)        Color   Clarity   Blood   Leuk Est   Nitrite   Protein   CREAT   Urine HCG        05/06/25 1316 Yellow   Clear   Negative   Negative   Negative   Negative                   Urine Culture          7/10/2024    15:19 7/22/2024    14:19 8/21/2024    14:00   Urine Culture   Urine Culture >100,000 CFU/mL Escherichia coli  >100,000 CFU/mL Escherichia coli  >100,000 CFU/mL Escherichia coli         Lab Results   Component Value Date    GLUCOSE 85 04/18/2025    CALCIUM 9.9 04/18/2025     04/18/2025    K 4.9 04/18/2025    CO2 23.4 04/18/2025     04/18/2025    BUN 16 04/18/2025    CREATININE 1.19 (H) 04/18/2025    EGFRIFAFRI 83 08/04/2021    EGFRIFNONA 72 08/04/2021    BCR 13.4 04/18/2025    ANIONGAP 9.6 04/18/2025       Lab Results   Component Value Date    WBC 5.38 04/18/2025    HGB 15.8 04/18/2025    HCT 46.0 04/18/2025    MCV 93.9 04/18/2025     04/18/2025       Images:   Mammo Diagnostic  Digital Tomosynthesis Bilateral With CAD  Result Date: 3/12/2025  BI-RADS 3, probably benign findings.  RECOMMENDATION: 6-month follow-up left diagnostic mammogram and left breast ultrasound is advised for the probably benign mass in the left breast at 5:00, 6 cm from the nipple.  The standard false-negative rate of mammography is between 10% and 25%. Complex patterns or increased breast density will markedly elevate the false-negative rate of mammography.   A results letter, in lay terminology, will be given to the patient at the conclusion of the exam.  _____________________________________________________ Physician Order  Diagnostic Mammogram with Breast Ultrasound if needed.  Diagnosis:   Abnormal Mammogram  3/12/2025 3:21 PM by Dr. Jose Alfredo Darling MD on Workstation: [id number]      US Breast Left Limited  Result Date: 3/12/2025  BI-RADS 3, probably benign findings.  RECOMMENDATION: 6-month follow-up left diagnostic mammogram and left breast ultrasound is advised for the probably benign mass in the left breast at 5:00, 6 cm from the nipple.  The standard false-negative rate of mammography is between 10% and 25%. Complex patterns or increased breast density will markedly elevate the false-negative rate of mammography.   A results letter, in lay terminology, will be given to the patient at the conclusion of the exam.  _____________________________________________________ Physician Order  Diagnostic Mammogram with Breast Ultrasound if needed.  Diagnosis:   Abnormal Mammogram  3/12/2025 3:21 PM by Dr. Jose Alfredo Darling MD on Workstation: [id number]      MRI Brain Without Contrast  Result Date: 2/21/2025  Impression: Mild to moderate chronic and age-related changes are noted as above. There is otherwise no evidence of recent infarct, hemorrhage, mass or mass effect. Electronically Signed: Fredy Mcclain MD  2/21/2025 5:47 PM EST  Workstation ID: [id number]      Measures:   Tobacco:   Colleen Morgan  reports that she has  never smoked. She has been exposed to tobacco smoke. She has never used smokeless tobacco.       Urine Incontinence: Patient reports that she is not currently experiencing any symptoms of urinary incontinence.     Assessment / Plan      Assessment:  Colleen Morgan is a 74 y.o. who presented today with history of recurrent UTI and nephrolithiasis.     She will stop nightly Keflex as she has not had any UTIs since September, 2024 and creatinine 1.19.  Patient's  is requesting a refill for Keflex but reports they will not use unless she develops a UTI and they will let me know.    Will continue vaginal Estrace cream.  We discussed trial of beta 3 agonist for her OAB symptoms.  She defers at this time and will continue conservative management.  Will plan to reassess urinary symptoms at next follow-up.    Plan for repeat imaging for history of nephrolithiasis in the next year.     Diagnoses and all orders for this visit:    1. Recurrent UTI (Primary)  -     POC Urinalysis Dipstick, Automated  -     cephalexin (KEFLEX) 250 MG capsule; Take 1 capsule by mouth Every Night.  Dispense: 90 capsule; Refill: 1    2. History of nephrolithiasis    3. Overactive bladder    4. Vaginal atrophy  -     estradiol (ESTRACE) 0.1 MG/GM vaginal cream; Insert 2 g into the vagina 3 (Three) Times a Week.  Dispense: 42.5 g; Refill: 2         Follow Up:   Return in about 3 months (around 8/6/2025).    I spent approximately 20 minutes providing clinical care for this patient; including review of patient's chart and provider documentation, face to face time spent with patient in examination room (obtaining history, performing physical exam, discussing diagnosis and management options), placing orders, and completing patient documentation.     DANIEL Miranda  [facility]

## 2025-05-06 NOTE — TELEPHONE ENCOUNTER
How Severe Are Your Spot(S)?: mild Mr Morgan called re: wife's Wellbutrin & I informed him that the script was received by [facility] at 8AM today for the refill.    She took her last one yesterday & he would like to know if it is okay for her to be off it for a few days until they receive.   Hpi Title: Evaluation of Skin Lesions

## 2025-05-17 DIAGNOSIS — F33.1 MODERATE EPISODE OF RECURRENT MAJOR DEPRESSIVE DISORDER: ICD-10-CM

## 2025-05-17 RX ORDER — BUPROPION HYDROCHLORIDE 300 MG/1
300 TABLET ORAL EVERY MORNING
Qty: 90 TABLET | Refills: 1 | Status: SHIPPED | OUTPATIENT
Start: 2025-05-17 | End: 2026-05-17

## 2025-05-31 DIAGNOSIS — J30.2 SEASONAL ALLERGIES: ICD-10-CM

## 2025-06-02 ENCOUNTER — TELEPHONE (OUTPATIENT)
Age: 75
End: 2025-06-02
Payer: MEDICARE

## 2025-06-02 RX ORDER — LEVOCETIRIZINE DIHYDROCHLORIDE 5 MG/1
5 TABLET, FILM COATED ORAL EVERY EVENING
Qty: 90 TABLET | Refills: 3 | OUTPATIENT
Start: 2025-06-02

## 2025-06-02 NOTE — TELEPHONE ENCOUNTER
PATIENT'S SPOUSE CAME IN OFFICE REQUESTING SAMPLES OF   VRAYLAR 1.5 SAMPLES FOR PATIENT.   SCOT NOLAN LPN WAS NOTIFIED AND REVIEWED PATIENT'S CHART TO VERIFY INFORMATION AND GAVE 5 BOXES OF VRAYLAR 1.5 SAMPLES FOR PATIENT.

## 2025-06-02 NOTE — TELEPHONE ENCOUNTER
Rx Refill Note  Requested Prescriptions     Pending Prescriptions Disp Refills    levocetirizine (XYZAL) 5 MG tablet 90 tablet 3     Sig: Take 1 tablet by mouth Every Evening.      Last office visit with prescribing clinician: 4/18/2025   Last telemedicine visit with prescribing clinician: Visit date not found   Next office visit with prescribing clinician: 7/18/2025                         Would you like a call back once the refill request has been completed: [] Yes [] No    If the office needs to give you a call back, can they leave a voicemail: [] Yes [] No    April BECKY Peters  06/02/25, 10:11 EDT

## 2025-06-07 DIAGNOSIS — F33.1 MODERATE EPISODE OF RECURRENT MAJOR DEPRESSIVE DISORDER: ICD-10-CM

## 2025-06-07 DIAGNOSIS — I10 PRIMARY HYPERTENSION: ICD-10-CM

## 2025-06-07 RX ORDER — BUPROPION HYDROCHLORIDE 300 MG/1
300 TABLET ORAL EVERY MORNING
Qty: 90 TABLET | Refills: 1 | Status: CANCELLED | OUTPATIENT
Start: 2025-06-07 | End: 2026-06-07

## 2025-06-09 RX ORDER — AMLODIPINE BESYLATE 5 MG/1
5 TABLET ORAL DAILY
Qty: 30 TABLET | Refills: 0 | Status: SHIPPED | OUTPATIENT
Start: 2025-06-09

## 2025-06-09 NOTE — TELEPHONE ENCOUNTER
Rx Refill Note  Requested Prescriptions     Pending Prescriptions Disp Refills    amLODIPine (NORVASC) 5 MG tablet 90 tablet 1     Sig: Take 1 tablet by mouth Daily.      Last office visit with prescribing clinician: 4/18/2025   Last telemedicine visit with prescribing clinician: Visit date not found   Next office visit with prescribing clinician: 7/18/2025                         Would you like a call back once the refill request has been completed: [] Yes [] No    If the office needs to give you a call back, can they leave a voicemail: [] Yes [] No    April BECKY Peters  06/09/25, 09:03 EDT

## 2025-06-11 ENCOUNTER — OFFICE VISIT (OUTPATIENT)
Age: 75
End: 2025-06-11
Payer: MEDICARE

## 2025-06-11 VITALS
OXYGEN SATURATION: 93 % | SYSTOLIC BLOOD PRESSURE: 124 MMHG | BODY MASS INDEX: 27.25 KG/M2 | HEIGHT: 62 IN | DIASTOLIC BLOOD PRESSURE: 76 MMHG | WEIGHT: 148.1 LBS | HEART RATE: 62 BPM

## 2025-06-11 DIAGNOSIS — F33.1 MODERATE EPISODE OF RECURRENT MAJOR DEPRESSIVE DISORDER: Primary | ICD-10-CM

## 2025-06-11 DIAGNOSIS — R41.840 ATTENTION OR CONCENTRATION DEFICIT: ICD-10-CM

## 2025-06-11 DIAGNOSIS — F41.9 ANXIETY: ICD-10-CM

## 2025-06-11 DIAGNOSIS — R41.3 MEMORY DEFICITS: ICD-10-CM

## 2025-06-11 RX ORDER — ATOMOXETINE 18 MG/1
18 CAPSULE ORAL DAILY
Qty: 30 CAPSULE | Refills: 1 | Status: SHIPPED | OUTPATIENT
Start: 2025-06-11 | End: 2026-06-11

## 2025-06-11 NOTE — PROGRESS NOTES
Follow Up Office Visit      Patient Name: Colleen Morgan  : 1950   MRN: [id number]     Referring Provider: Marsha Kraft PA-C    Chief Complaint:      ICD-10-CM ICD-9-CM   1. Moderate episode of recurrent major depressive disorder  F33.1 296.32   2. Memory deficits  R41.3 780.93   3. Anxiety  F41.9 300.00   4. Attention or concentration deficit  R41.840 799.51        History of Present Illness:   Colleen Morgan is a 74 y.o. female who is here today for follow up and medication management.           Subjective      Patient Reports:   History of Present Illness  She reports a positive response to Strattera, which was previously discontinued due to potential neurological side effects. She is interested in resuming Strattera and inquires about its compatibility with her current medications, Vraylar and Wellbutrin. Her mood has been stable, and she has not experienced any panic attacks or outbursts. However, she reports poor sleep quality, attributing it to stress related to her granddaughter's upcoming wedding. She is currently on a regimen of Vraylar, administered every other day, and Wellbutrin.    Her appetite remains good, and she does not report any feelings of hopelessness or loneliness. She recently experienced a fall, resulting in a bump on her head and pain in her buttocks.    She denies SI/HI/AVH.    MEDICATIONS  Current: Vraylar, Wellbutrin      PHQ-9= 7 decreased score from previous visit  BENIGNO-7= 4 decreased score from previous visit    Review of Systems:   Review of Systems   Constitutional:  Negative for appetite change, fatigue and unexpected weight change.   Eyes:  Negative for visual disturbance.   Respiratory:  Negative for chest tightness and shortness of breath.    Cardiovascular:  Negative for chest pain.   Musculoskeletal:  Negative for gait problem.   Skin:  Negative for rash and wound.   Neurological:  Negative for dizziness, tremors, seizures, weakness,  light-headedness and headaches.   Psychiatric/Behavioral:  Positive for decreased concentration. Negative for agitation, behavioral problems, confusion, dysphoric mood, hallucinations, self-injury, sleep disturbance and suicidal ideas. The patient is not nervous/anxious and is not hyperactive.    Sleep pattern: 6-8 hours per night   Appetite: normal     PHQ-9 Depression Screening  Little interest or pleasure in doing things? Not at all   Feeling down, depressed, or hopeless? Several days   PHQ-2 Total Score 1   Trouble falling or staying asleep, or sleeping too much? Several days   Feeling tired or having little energy? Not at all   Poor appetite or overeating? Not at all   Feeling bad about yourself - or that you are a failure or have let yourself or your family down? Over half   Trouble concentrating on things, such as reading the newspaper or watching television? Almost all   Moving or speaking so slowly that other people could have noticed? Or the opposite - being so fidgety or restless that you have been moving around a lot more than usual? Not at all   Thoughts that you would be better off dead, or of hurting yourself in some way? Not at all   PHQ-9 Total Score 7   If you checked off any problems, how difficult have these problems made it for you to do your work, take care of things at home, or get along with other people? Not difficult at all       BENIGNO-7 Anxiety Screening  Over the last two weeks, how often have you been bothered by the following problems?  Feeling nervous, anxious or on edge: Not at all  Not being able to stop or control worrying: Several days  Worrying too much about different things: Several days  Trouble Relaxing: Not at all  Being so restless that it is hard to sit still: Not at all  Becoming easily annoyed or irritable: Several days  Feeling afraid as if something awful might happen: Several days  BENIGNO 7 Total Score: 4  If you checked any problems, how difficult have these problems made  it for you to do your work, take care of things at home, or get along with other people: Not difficult at all    RISK ASSESSMENT:  Patient denies any thoughts or intent of suicide today. Patient denies any impulsive behavior today.     Medications:     Current Outpatient Medications:     amLODIPine (NORVASC) 5 MG tablet, Take 1 tablet by mouth Daily., Disp: 30 tablet, Rfl: 0    buPROPion XL (Wellbutrin XL) 300 MG 24 hr tablet, Take 1 tablet by mouth Every Morning., Disp: 90 tablet, Rfl: 1    Cariprazine HCl (VRAYLAR) 1.5 MG capsule capsule, Take 1 capsule 1.5mg every other day., Disp: 30 capsule, Rfl: 1    cephalexin (KEFLEX) 250 MG capsule, Take 1 capsule by mouth Every Night., Disp: 90 capsule, Rfl: 1    Cholecalciferol 50 MCG (2000 UT) capsule, Take  by mouth Daily., Disp: , Rfl:     cycloSPORINE (Restasis) 0.05 % ophthalmic emulsion, Apply 1 drop to eye(s) as directed by provider Every 12 (Twelve) Hours., Disp: , Rfl:     estradiol (ESTRACE) 0.1 MG/GM vaginal cream, Insert 2 g into the vagina 3 (Three) Times a Week., Disp: 42.5 g, Rfl: 2    levocetirizine (XYZAL) 5 MG tablet, Take 1 tablet by mouth Every Evening., Disp: 90 tablet, Rfl: 3    magnesium oxide (MAG-OX) 400 MG tablet, Take 1 tablet by mouth Daily., Disp: 90 tablet, Rfl: 1    nebivolol (BYSTOLIC) 10 MG tablet, Take 1 tablet by mouth Daily., Disp: 90 tablet, Rfl: 1    pantoprazole (PROTONIX) 40 MG EC tablet, Take 1 tablet by mouth Daily., Disp: 90 tablet, Rfl: 3    atomoxetine (Strattera) 18 MG capsule, Take 1 capsule by mouth Daily., Disp: 30 capsule, Rfl: 1    rosuvastatin (Crestor) 5 MG tablet, Take 1 tablet by mouth Daily. (Patient not taking: Reported on 6/11/2025), Disp: 30 tablet, Rfl: 0    Current Facility-Administered Medications:     denosumab (PROLIA) syringe 60 mg, 60 mg, Subcutaneous, Once, Ebonie Lilly, DO    Medication Considerations:  MATTHEW reviewed and appropriate.      Allergies:   Allergies   Allergen Reactions    Ace  "Inhibitors Anaphylaxis and Other (See Comments)     Taken with avelox - throat swelling    Fluticasone Other (See Comments)     \"Increases heart rate, dizziness, felt like something is not right\"    Moxifloxacin Other (See Comments)     When taken with With lisinopril- throat swelling    Pravastatin Myalgia     Stopped per Dr Ernandez due to leg cramps severe       Results Reviewed:   Yes     Objective     Physical Exam:  Vital Signs:   Vitals:    06/11/25 1609 06/11/25 1610   BP:  124/76   Pulse: 62    SpO2: 93%    Weight: 67.2 kg (148 lb 1.6 oz)    Height: 157.5 cm (62.01\")      Body mass index is 27.08 kg/m².     Mental Status Exam:   MENTAL STATUS EXAM   General Appearance:  Cleanly groomed and dressed and well developed  Eye Contact:  Good eye contact  Attitude:  Cooperative and polite  Motor Activity:  Normal gait, posture and fidgety  Muscle Strength:  Normal  Speech:  Normal rate, tone, volume  Language:  Spontaneous  Mood and affect:  Normal, pleasant  Hopelessness:  Denies  Loneliness: Denies  Thought Process:  Logical  Associations/ Thought Content:  No delusions  Hallucinations:  None  Suicidal Ideations:  Not present  Homicidal Ideation:  Not present  Sensorium:  Alert and clear  Orientation:  Person, place, time and situation  Immediate Recall, Recent, and Remote Memory:  Deficit noted  Attention Span/ Concentration:  Easily distracted  Fund of Knowledge:  Appropriate for age and educational level  Intellectual Functioning:  Average range  Insight:  Fair  Judgement:  Fair  Reliability:  Fair  Impulse Control:  Fair       @RESULASTCBCDIFFPANEL,TSH,LABLIPI,[id number],LTOIRRYR26,MG,FOLATE,PROLACTIN,CRPRESULT,CMP,[id number])@    Lab Results   Component Value Date    GLUCOSE 85 04/18/2025    BUN 16 04/18/2025    CREATININE 1.19 (H) 04/18/2025    EGFR 47.8 (L) 04/18/2025    BCR 13.4 04/18/2025    K 4.9 04/18/2025    CO2 23.4 04/18/2025    CALCIUM 9.9 04/18/2025    ALBUMIN 4.0 04/18/2025    BILITOT 0.3 " 07/22/2024    AST 28 07/22/2024    ALT 33 07/22/2024       Lab Results   Component Value Date    WBC 5.38 04/18/2025    HGB 15.8 04/18/2025    HCT 46.0 04/18/2025    MCV 93.9 04/18/2025     04/18/2025       Lab Results   Component Value Date    CHOL 202 (H) 04/18/2025    TRIG 128 04/18/2025    HDL 87 (H) 04/18/2025    LDL 93 04/18/2025       Assessment / Plan      Visit Diagnosis/Orders Placed This Visit:  Diagnoses and all orders for this visit:    1. Moderate episode of recurrent major depressive disorder (Primary)    2. Memory deficits    3. Anxiety    4. Attention or concentration deficit  -     atomoxetine (Strattera) 18 MG capsule; Take 1 capsule by mouth Daily.  Dispense: 30 capsule; Refill: 1       Assessment & Plan  1. Attention deficit hyperactivity disorder (ADHD).  She will be reintroduced to Strattera at a low dose of 18 mg to minimize potential side effects. If no side effects are observed after a few weeks, the dosage may be adjusted. She is currently on Vraylar and Wellbutrin, and these medications will be continued as they have been effective in managing her symptoms.    2. Mood   Her mood has been stable with the current regimen of Vraylar and Wellbutrin. She reports no recent panic attacks or outbursts. She is advised to continue her current medications and monitor for any changes in mood or new symptoms.      Functional Status: Moderate impairment     Prognosis: Good with Ongoing Treatment     Impression/Formulation:  Patient appeared alert and oriented.  Patient is voluntarily requesting to continue outpatient psychiatric treatment at [facility] [address] [place] [address].  Patient is receptive to assistance with maintaining a stable lifestyle.  Patient presents with history of     ICD-10-CM ICD-9-CM   1. Moderate episode of recurrent major depressive disorder  F33.1 296.32   2. Memory deficits  R41.3 780.93   3. Anxiety  F41.9 300.00   4. Attention or concentration  deficit  R41.840 799.51   .    Reviewed patient's previous provider notes. Reviewed most recent labs. Patient meets DSM V diagnostic criteria for diagnoses. Diagnoses may be updated as more information becomes available.       Treatment Plan:   Continue Wellbutrin XL 300mg PO qam and Vraylar 1.5mg PO every other day   Initiate Strattera 18mg PO qd   Provided 3 boxes of Vraylar 1.5mg samples  Patient was informed Provider's last day at practice is August 14. Patient verbalized understanding   Encouraged to pursue psychotherapy  Follow-up in 6 weeks and as needed    Patient will continue supportive psychotherapy efforts and medications as indicated. Clinic will obtain release of information for current treatment team for continuity of care as needed. Patient will contact this office, call 911 or present to the nearest emergency room should suicidal or homicidal ideations occur.  Discussed medication options and treatment plan of prescribed medication(s) as well as the risks, benefits, and potential side effects. Patient acknowledged and verbally consented to continue with current treatment plan and was educated on the importance of compliance with treatment and follow-up appointments.     Pt has no previous history of seizure or current eating disorder, which would be a contraindication for use. The possibility of activation with initiation was discussed and patient verbalizes understanding.    All risks/benefits and side effects discussed with patient, including risk for weight gain, increased lipids, hyperprolactemia, EPS symptoms, including restlessness, muscle  stiffness or contraction of head, face, neck, trunk and limbs, and TD (which can be irreversible). Pt verbalizes understanding and consents to treatment with these medications.    Instructed will take 4-6 weeks for full therapeutic effect. Medication risks and side effects discussed with patient including risk for worsening mood, changes in behavior,  thoughts of suicide or homicide, induction of bertin, serotonin syndrome, rare hyponatremia, rare SIADH, withdrawal symptoms if abrupt withdrawal, sexual dysfunction.   If any thoughts of SI or HI, worsening mood or changes in behavior, call 911 or crisis line [phone], or go to nearest ER at once. Patient agrees to notify close family/friend of new trial of antidepressants/info above. Pt.verbalizes understanding and consents to treatment with this medication.      Quality Measures:   Never smoker    I advised Colleen Morgan of the risks of tobacco use.     Follow Up:   Return in about 6 weeks (around 7/23/2025), or 30 mins.      Patient or patient representative verbalized consent for the use of Ambient Listening during the visit with  DANIEL Tsai for chart documentation. 6/12/2025  22:35 EDT    DANIEL Tsai  [facility] [address]

## 2025-07-09 DIAGNOSIS — I10 PRIMARY HYPERTENSION: ICD-10-CM

## 2025-07-09 DIAGNOSIS — R41.840 ATTENTION OR CONCENTRATION DEFICIT: ICD-10-CM

## 2025-07-09 RX ORDER — AMLODIPINE BESYLATE 5 MG/1
5 TABLET ORAL DAILY
Qty: 30 TABLET | Refills: 0 | Status: CANCELLED | OUTPATIENT
Start: 2025-07-09

## 2025-07-09 RX ORDER — ATOMOXETINE 18 MG/1
18 CAPSULE ORAL DAILY
Qty: 30 CAPSULE | Refills: 1 | OUTPATIENT
Start: 2025-07-09 | End: 2026-07-09

## 2025-07-09 RX ORDER — AMLODIPINE BESYLATE 5 MG/1
5 TABLET ORAL DAILY
Qty: 30 TABLET | Refills: 0 | Status: SHIPPED | OUTPATIENT
Start: 2025-07-09

## 2025-07-10 DIAGNOSIS — F33.1 MODERATE EPISODE OF RECURRENT MAJOR DEPRESSIVE DISORDER: ICD-10-CM

## 2025-07-11 RX ORDER — BUPROPION HYDROCHLORIDE 300 MG/1
300 TABLET ORAL EVERY MORNING
Qty: 90 TABLET | Refills: 1 | Status: SHIPPED | OUTPATIENT
Start: 2025-07-11 | End: 2026-07-11

## 2025-07-18 ENCOUNTER — OFFICE VISIT (OUTPATIENT)
Dept: FAMILY MEDICINE CLINIC | Facility: CLINIC | Age: 75
End: 2025-07-18
Payer: MEDICARE

## 2025-07-18 VITALS
DIASTOLIC BLOOD PRESSURE: 76 MMHG | HEIGHT: 62 IN | HEART RATE: 57 BPM | WEIGHT: 148 LBS | BODY MASS INDEX: 27.23 KG/M2 | OXYGEN SATURATION: 96 % | SYSTOLIC BLOOD PRESSURE: 124 MMHG

## 2025-07-18 DIAGNOSIS — I10 PRIMARY HYPERTENSION: ICD-10-CM

## 2025-07-18 DIAGNOSIS — R26.81 GAIT INSTABILITY: Primary | ICD-10-CM

## 2025-07-18 DIAGNOSIS — R42 DIZZINESS: ICD-10-CM

## 2025-07-18 DIAGNOSIS — R53.1 WEAKNESS: ICD-10-CM

## 2025-07-18 DIAGNOSIS — E78.2 MIXED HYPERLIPIDEMIA: ICD-10-CM

## 2025-07-18 RX ORDER — KETOCONAZOLE 20 MG/ML
SHAMPOO, SUSPENSION TOPICAL
COMMUNITY
Start: 2025-07-01

## 2025-07-18 RX ORDER — NEBIVOLOL 10 MG/1
10 TABLET ORAL DAILY
Qty: 90 TABLET | Refills: 1 | Status: SHIPPED | OUTPATIENT
Start: 2025-07-18

## 2025-07-18 RX ORDER — AMLODIPINE BESYLATE 2.5 MG/1
2.5 TABLET ORAL DAILY
Qty: 90 TABLET | Refills: 1 | Status: SHIPPED | OUTPATIENT
Start: 2025-07-18

## 2025-07-18 RX ORDER — ROSUVASTATIN CALCIUM 5 MG/1
5 TABLET, COATED ORAL DAILY
Qty: 90 TABLET | Refills: 1 | Status: SHIPPED | OUTPATIENT
Start: 2025-07-18

## 2025-07-18 NOTE — PROGRESS NOTES
Chief Complaint   Patient presents with    Dizziness     3 month follow up        History of Present Illness  This is a 74-year-old female with a history of hypertension, depression with anxiety, gait instability, weakness, and dizziness presenting for evaluation.    The patient reports that she has been focusing on increasing her walking activity but experiences sluggishness, drags her heels, and occasionally stumbles forward. Has ongoing episodic dizziness, leg weakness.  She has not yet started physical therapy. She is concerned about her walking ability due to an upcoming trip to [place]. She reports no recent stroke-like episodes. In mid-06/2025, she had a fall where she hit her head on concrete but did not seek emergency care. She applied ice to the area and can still feel a bump. She experienced headaches immediately after the fall but none since then.  She is established with neurology and has an appointment in August.    Her blood pressure has been stable.  She has noticed ankle swelling since her diuretic has been discontinued. Her home blood pressure readings have been in the 120s/80s. She continues to take rosuvastatin.    Her mood is currently stable and well-managed.  Established with behavioral health.    Past Medical History:   Diagnosis Date    ADHD (attention deficit hyperactivity disorder)     Allergic     Arthritis     Arthritis of back 2000    Arthritis of neck ?    Asthma     Bursitis of hip 2008    Chronic pain disorder     Arthritis degenerative disc disorder    Colon polyp     Coronary artery disease     Depression     Disease of thyroid gland     Actually parathyroid produces too much calcium    GERD (gastroesophageal reflux disease)     Headache     Heart murmur     Hip arthrosis     HL (hearing loss)     Hyperparathyroidism 2019    Noted by my cardiologist    Hypertension     Irritable bowel syndrome     Low back pain     Low back strain 2010    Neck strain 2001    Osteopenia      Osteoporosis     Periarthritis of shoulder 2024    Found in MRI of rotator cuff    PTSD (post-traumatic stress disorder) 10/2012    Rotator cuff syndrome     Tennis elbow 2000    Urinary tract infection     Visual impairment     Vitamin D deficiency     Wrist sprain 2005    Resulted from falling       Past Surgical History:   Procedure Laterality Date    ADENOIDECTOMY  1956    With tonsilectomy    APPENDECTOMY  1968    CARDIAC CATHETERIZATION      CATARACT EXTRACTION Bilateral 2012    COLONOSCOPY  6/2:    Colonoscopy and upper GI    HYSTERECTOMY  1996    OOPHORECTOMY      SHOULDER ARTHROSCOPY Right 02/19/2024    arthroscopic rotator cuff repair, arthroscopic biceps tenodesis, arthroscopic extensive debridement, arthroscopic subacromial decompression with acromioplasty, Dr. Regan Vnan    SHOULDER SURGERY  02/24    Right Rotator cuff, bicep trars    SINUS SURGERY      x 2    SKIN BIOPSY      Most of body but were not malignant    TONSILLECTOMY AND ADENOIDECTOMY      TUBAL ABDOMINAL LIGATION  1979       Family History   Problem Relation Age of Onset    Diabetes Mother     Arthritis Mother     Hypertension Mother     Osteoarthritis Mother     Hearing loss Mother     Osteoporosis Mother     Memory loss Mother     Heart failure Father     Bipolar disorder Father     Alcohol abuse Father     Depression Father     Hearing loss Father     Hyperlipidemia Father     Hypertension Brother     Thyroid disease Brother     Anesthesia problems Brother     Clotting disorder Brother     Diabetes Brother     Anxiety disorder Daughter     Depression Daughter     Anxiety disorder Daughter     Depression Daughter     Developmental Disability Daughter     Learning disabilities Daughter     Parkinson's disease Paternal Aunt     Alzheimer disease Paternal Aunt         undiagnosed, dx reported by daughter    ALS (amyotrophic lateral sclerosis) Paternal Aunt     Parkinson's disease Paternal Uncle     Multiple sclerosis Paternal Cousin   "   Breast cancer Neg Hx     Ovarian cancer Neg Hx        Social History     Socioeconomic History    Marital status:    Tobacco Use    Smoking status: Never     Passive exposure: Past    Smokeless tobacco: Never    Tobacco comments:     Tried a few times but quit   Vaping Use    Vaping status: Never Used   Substance and Sexual Activity    Alcohol use: Yes     Alcohol/week: 1.0 - 2.0 standard drink of alcohol     Types: 1 - 2 Glasses of wine per week     Comment: With dinner    Drug use: Never    Sexual activity: Yes     Partners: Male     Birth control/protection: Post-menopausal, Tubal ligation, Hysterectomy       Allergies   Allergen Reactions    Ace Inhibitors Anaphylaxis and Other (See Comments)     Taken with avelox - throat swelling    Fluticasone Other (See Comments)     \"Increases heart rate, dizziness, felt like something is not right\"    Moxifloxacin Other (See Comments)     When taken with With lisinopril- throat swelling    Pravastatin Myalgia     Stopped per Dr Ernandez due to leg cramps severe       ROS  Review of Systems   Constitutional:  Negative for chills, fatigue and fever.   Musculoskeletal:  Positive for gait problem.   Neurological:  Positive for dizziness and weakness. Negative for syncope, facial asymmetry, speech difficulty, light-headedness and headache.       Vitals:    07/18/25 1514   BP: 124/76   BP Location: Right arm   Patient Position: Sitting   Cuff Size: Adult   Pulse: 57   SpO2: 96%   Weight: 67.1 kg (148 lb)   Height: 157.5 cm (62.01\")     Body mass index is 27.06 kg/m².           Current Outpatient Medications on File Prior to Visit   Medication Sig Dispense Refill    atomoxetine (Strattera) 18 MG capsule Take 1 capsule by mouth Daily. 30 capsule 1    buPROPion HBr (APLENZIN PO)       buPROPion XL (Wellbutrin XL) 300 MG 24 hr tablet Take 1 tablet by mouth Every Morning. 90 tablet 1    Cariprazine HCl (VRAYLAR) 1.5 MG capsule capsule Take 1 capsule 1.5mg every other day. 30 " capsule 1    cephalexin (KEFLEX) 250 MG capsule Take 1 capsule by mouth Every Night. 90 capsule 1    Cholecalciferol 50 MCG (2000 UT) capsule Take  by mouth Daily.      cycloSPORINE (Restasis) 0.05 % ophthalmic emulsion Apply 1 drop to eye(s) as directed by provider Every 12 (Twelve) Hours.      estradiol (ESTRACE) 0.1 MG/GM vaginal cream Insert 2 g into the vagina 3 (Three) Times a Week. 42.5 g 2    ketoconazole (NIZORAL) 2 % shampoo APPLY TO THE AFFECTED AREA(S), LATHER, LEAVE IN PLACE FOR 5 MINUTES, AND THEN RINSE OFF WITH WATER BY TOPICAL ROUTE 2-3x WEEKLY      levocetirizine (XYZAL) 5 MG tablet Take 1 tablet by mouth Every Evening. 90 tablet 3    pantoprazole (PROTONIX) 40 MG EC tablet Take 1 tablet by mouth Daily. 90 tablet 3    [DISCONTINUED] amLODIPine (NORVASC) 5 MG tablet Take 1 tablet by mouth Daily. 30 tablet 0    [DISCONTINUED] magnesium oxide (MAG-OX) 400 MG tablet Take 1 tablet by mouth Daily. 90 tablet 1    [DISCONTINUED] nebivolol (BYSTOLIC) 10 MG tablet Take 1 tablet by mouth Daily. 90 tablet 1    [DISCONTINUED] rosuvastatin (Crestor) 5 MG tablet Take 1 tablet by mouth Daily. (Patient not taking: Reported on 7/18/2025) 30 tablet 0     Current Facility-Administered Medications on File Prior to Visit   Medication Dose Route Frequency Provider Last Rate Last Admin    denosumab (PROLIA) syringe 60 mg  60 mg Subcutaneous Once Ebonie Lilly,            Results for orders placed or performed in visit on 05/06/25   POC Urinalysis Dipstick, Automated    Collection Time: 05/06/25  1:16 PM    Specimen: Urine   Result Value Ref Range    Color Yellow Yellow, Straw, Dark Yellow, Aissatou    Clarity, UA Clear Clear    Specific Gravity  1.030 1.005 - 1.030    pH, Urine 6.0 5.0 - 8.0    Leukocytes Negative Negative    Nitrite, UA Negative Negative    Protein, POC Negative Negative mg/dL    Glucose, UA Negative Negative mg/dL    Ketones, UA Negative Negative    Urobilinogen, UA Normal Normal, 0.2 E.U./dL     Bilirubin Negative Negative    Blood, UA Negative Negative    Lot Number [id number]     Expiration Date 9/19/2026        PE    Physical Exam  Vitals reviewed.   Constitutional:       General: She is not in acute distress.     Appearance: Normal appearance. She is well-developed. She is not ill-appearing or diaphoretic.   HENT:      Head: Normocephalic and atraumatic.   Eyes:      Extraocular Movements: Extraocular movements intact.      Conjunctiva/sclera: Conjunctivae normal.   Pulmonary:      Effort: No respiratory distress.   Musculoskeletal:         General: Normal range of motion.      Cervical back: Normal range of motion.   Neurological:      General: No focal deficit present.      Mental Status: She is alert.   Psychiatric:         Attention and Perception: She is attentive.         Mood and Affect: Mood normal.         Speech: Speech normal.         Behavior: Behavior normal. Behavior is cooperative.         Thought Content: Thought content normal.         Judgment: Judgment normal.           A/P    Diagnoses and all orders for this visit:    1. Gait instability (Primary)  2. Weakness  Ongoing.  May be related to deconditioning, inner ear issues, hypotension, or neurological concerns.  Recommend PT.  Gave patient referral form for PT and she will call to schedule.  Follow-up with neurology in August.    3. Dizziness  Improving.  Happens occasionally when bending over, walking.  Will try lower dose of amlodipine.    4. Primary hypertension  -     amLODIPine (NORVASC) 2.5 MG tablet; Take 1 tablet by mouth Daily.  Dispense: 90 tablet; Refill: 1  -     nebivolol (BYSTOLIC) 10 MG tablet; Take 1 tablet by mouth Daily.  Dispense: 90 tablet; Refill: 1  BP is borderline low.  Normal at home in 120s/80s.  Has ankle swelling and episodic dizziness.  Will reduce amlodipine from 5 mg to 2.5 mg.  Monitor BP at home and call if elevated.    5. Mixed hyperlipidemia  -     rosuvastatin (Crestor) 5 MG tablet; Take 1  tablet by mouth Daily.  Dispense: 90 tablet; Refill: 1           Plan of care reviewed with patient at the conclusion of today's visit. Education was provided regarding diagnosis, management and any prescribed or recommended OTC medications.  Patient verbalizes understanding of and agreement with management plan.    Patient or patient representative verbalized consent for the use of Ambient Listening during the visit with  Marsha Kraft PA-C for chart documentation. 7/18/2025  15:55 EDT    Part of this note may be an electronic transcription/translation of spoken language to printed text using the Dragon Dictation System.    Return in about 5 months (around 12/31/2025) for Medicare Wellness.     Marsha Kraft PA-C

## 2025-07-23 ENCOUNTER — OFFICE VISIT (OUTPATIENT)
Age: 75
End: 2025-07-23
Payer: MEDICARE

## 2025-07-23 VITALS
DIASTOLIC BLOOD PRESSURE: 80 MMHG | HEIGHT: 62 IN | WEIGHT: 146.7 LBS | HEART RATE: 60 BPM | SYSTOLIC BLOOD PRESSURE: 128 MMHG | BODY MASS INDEX: 27 KG/M2 | OXYGEN SATURATION: 92 %

## 2025-07-23 DIAGNOSIS — R41.3 MEMORY DEFICITS: ICD-10-CM

## 2025-07-23 DIAGNOSIS — R41.840 ATTENTION OR CONCENTRATION DEFICIT: ICD-10-CM

## 2025-07-23 DIAGNOSIS — F41.1 GENERALIZED ANXIETY DISORDER: ICD-10-CM

## 2025-07-23 DIAGNOSIS — F33.1 MODERATE EPISODE OF RECURRENT MAJOR DEPRESSIVE DISORDER: Primary | ICD-10-CM

## 2025-07-23 RX ORDER — ATOMOXETINE 18 MG/1
18 CAPSULE ORAL DAILY
Qty: 90 CAPSULE | Refills: 1 | Status: SHIPPED | OUTPATIENT
Start: 2025-07-23 | End: 2026-07-23

## 2025-07-23 NOTE — PROGRESS NOTES
Follow Up Office Visit      Patient Name: Colleen Morgan  : 1950   MRN: [id number]     Referring Provider: Marsha Kraft PA-C    Chief Complaint:      ICD-10-CM ICD-9-CM   1. Moderate episode of recurrent major depressive disorder  F33.1 296.32   2. Attention or concentration deficit  R41.840 799.51   3. Memory deficits  R41.3 780.93   4. Generalized anxiety disorder  F41.1 300.02        History of Present Illness:   Colleen Morgan is a 74 y.o. female who is here today for follow up and medication management.  Patient's  accompanied patient during the visit with patient's verbal consent.           Subjective      Patient Reports:   History of Present Illness  She reports a positive response to Strattera with improved cognitive function. She is currently on Vraylar, which she finds highly effective. She has not experienced any panic attacks or outbursts but admits to occasional irritability. Her sleep pattern is generally good, with occasional restlessness due to shoulder discomfort. She typically wakes up between 7:30 and 9:00 AM and no longer sleeps excessively.  She was previously sleeping 20 to 22 hours/day.  Her appetite remains normal, and she reports no changes in her physical health. She does not endorse any suicidal ideation, self-harm, hallucinations, feelings of hopelessness, or loneliness. However, she expresses concern related to her granddaughter.     She is planning a trip to [place] from 2025 to 2025.  She is calm and cooperative throughout visit.  She wishes to continue with current medication regimen, as she reports it is effectively managing her symptoms.      MEDICATIONS  Current: Strattera, Vraylar, Wellbutrin XL    PHQ-9= 10 increased score from previous visit  BENIGNO-7= 8 increased score from previous visit    Review of Systems:   Review of Systems   Constitutional:  Negative for appetite change, fatigue and unexpected weight change.   Eyes:   Negative for visual disturbance.   Respiratory:  Negative for chest tightness and shortness of breath.    Cardiovascular:  Negative for chest pain.   Musculoskeletal:  Negative for gait problem.   Skin:  Negative for rash and wound.   Neurological:  Negative for dizziness, tremors, seizures, weakness, light-headedness and headaches.   Psychiatric/Behavioral:  Negative for agitation, behavioral problems, confusion, decreased concentration, dysphoric mood, hallucinations, self-injury, sleep disturbance and suicidal ideas. The patient is not nervous/anxious and is not hyperactive.    Sleep pattern: 6-8 hours per night   Appetite: normal     PHQ-9 Depression Screening  Little interest or pleasure in doing things? Several days   Feeling down, depressed, or hopeless? Several days   PHQ-2 Total Score 2   Trouble falling or staying asleep, or sleeping too much? Several days   Feeling tired or having little energy? Over half   Poor appetite or overeating? Several days   Feeling bad about yourself - or that you are a failure or have let yourself or your family down? Several days   Trouble concentrating on things, such as reading the newspaper or watching television? Over half   Moving or speaking so slowly that other people could have noticed? Or the opposite - being so fidgety or restless that you have been moving around a lot more than usual? Several days   Thoughts that you would be better off dead, or of hurting yourself in some way? Not at all   PHQ-9 Total Score 10   If you checked off any problems, how difficult have these problems made it for you to do your work, take care of things at home, or get along with other people? Somewhat difficult       BENIGNO-7 Anxiety Screening  Over the last two weeks, how often have you been bothered by the following problems?  Feeling nervous, anxious or on edge: Several days  Not being able to stop or control worrying: More than half the days  Worrying too much about different things:  More than half the days  Trouble Relaxing: Several days  Being so restless that it is hard to sit still: Several days  Becoming easily annoyed or irritable: Several days  Feeling afraid as if something awful might happen: Not at all  BENIGNO 7 Total Score: 8  If you checked any problems, how difficult have these problems made it for you to do your work, take care of things at home, or get along with other people: Somewhat difficult    RISK ASSESSMENT:  Patient denies any thoughts or intent of suicide today. Patient denies any impulsive behavior today.     Medications:     Current Outpatient Medications:     amLODIPine (NORVASC) 2.5 MG tablet, Take 1 tablet by mouth Daily., Disp: 90 tablet, Rfl: 1    atomoxetine (Strattera) 18 MG capsule, Take 1 capsule by mouth Daily., Disp: 90 capsule, Rfl: 1    buPROPion HBr (APLENZIN PO), , Disp: , Rfl:     buPROPion XL (Wellbutrin XL) 300 MG 24 hr tablet, Take 1 tablet by mouth Every Morning., Disp: 90 tablet, Rfl: 1    Cariprazine HCl (VRAYLAR) 1.5 MG capsule capsule, Take 1 capsule 1.5mg every other day., Disp: 90 capsule, Rfl: 1    cephalexin (KEFLEX) 250 MG capsule, Take 1 capsule by mouth Every Night., Disp: 90 capsule, Rfl: 1    Cholecalciferol 50 MCG (2000 UT) capsule, Take  by mouth Daily., Disp: , Rfl:     cycloSPORINE (Restasis) 0.05 % ophthalmic emulsion, Apply 1 drop to eye(s) as directed by provider Every 12 (Twelve) Hours., Disp: , Rfl:     estradiol (ESTRACE) 0.1 MG/GM vaginal cream, Insert 2 g into the vagina 3 (Three) Times a Week., Disp: 42.5 g, Rfl: 2    ketoconazole (NIZORAL) 2 % shampoo, APPLY TO THE AFFECTED AREA(S), LATHER, LEAVE IN PLACE FOR 5 MINUTES, AND THEN RINSE OFF WITH WATER BY TOPICAL ROUTE 2-3x WEEKLY, Disp: , Rfl:     levocetirizine (XYZAL) 5 MG tablet, Take 1 tablet by mouth Every Evening., Disp: 90 tablet, Rfl: 3    nebivolol (BYSTOLIC) 10 MG tablet, Take 1 tablet by mouth Daily., Disp: 90 tablet, Rfl: 1    pantoprazole (PROTONIX) 40 MG EC tablet,  "Take 1 tablet by mouth Daily., Disp: 90 tablet, Rfl: 3    rosuvastatin (Crestor) 5 MG tablet, Take 1 tablet by mouth Daily., Disp: 90 tablet, Rfl: 1    Current Facility-Administered Medications:     denosumab (PROLIA) syringe 60 mg, 60 mg, Subcutaneous, Once, Ebonie Lilly,     Medication Considerations:  MATTHEW reviewed and appropriate.      Allergies:   Allergies   Allergen Reactions    Ace Inhibitors Anaphylaxis and Other (See Comments)     Taken with avelox - throat swelling    Fluticasone Other (See Comments)     \"Increases heart rate, dizziness, felt like something is not right\"    Moxifloxacin Other (See Comments)     When taken with With lisinopril- throat swelling    Pravastatin Myalgia     Stopped per Dr Ernandez due to leg cramps severe       Results Reviewed:   yes     Objective     Physical Exam:  Vital Signs:   Vitals:    07/23/25 1548   BP: 128/80   Pulse: 60   SpO2: 92%   Weight: 66.5 kg (146 lb 11.2 oz)   Height: 157.5 cm (62.01\")     Body mass index is 26.82 kg/m².     Mental Status Exam:   MENTAL STATUS EXAM   General Appearance:  Cleanly groomed and dressed and well developed  Eye Contact:  Good eye contact  Attitude:  Cooperative and polite  Motor Activity:  Normal gait, posture  Muscle Strength:  Normal  Speech:  Normal rate, tone, volume  Language:  Spontaneous  Mood and affect:  Normal, pleasant  Hopelessness:  Denies  Loneliness: Denies  Thought Process:  Logical  Associations/ Thought Content:  No delusions  Hallucinations:  None  Suicidal Ideations:  Not present  Homicidal Ideation:  Not present  Sensorium:  Alert and clear  Orientation:  Person, place, time and situation  Immediate Recall, Recent, and Remote Memory:  Deficit noted  Attention Span/ Concentration:  Easily distracted  Fund of Knowledge:  Appropriate for age and educational level  Intellectual Functioning:  Average range  Insight:  Fair  Judgement:  Fair  Reliability:  Fair  Impulse Control:  Fair   "     @RESULASTCBCDIFFPANEL,TSH,LABLIPI,FMPRVMHM09,SFVONBXM68,MG,FOLATE,PROLACTIN,CRPRESULT,CMP,[id number])@    Lab Results   Component Value Date    GLUCOSE 85 04/18/2025    BUN 16 04/18/2025    CREATININE 1.19 (H) 04/18/2025    EGFR 47.8 (L) 04/18/2025    BCR 13.4 04/18/2025    K 4.9 04/18/2025    CO2 23.4 04/18/2025    CALCIUM 9.9 04/18/2025    ALBUMIN 4.0 04/18/2025    BILITOT 0.3 07/22/2024    AST 28 07/22/2024    ALT 33 07/22/2024       Lab Results   Component Value Date    WBC 5.38 04/18/2025    HGB 15.8 04/18/2025    HCT 46.0 04/18/2025    MCV 93.9 04/18/2025     04/18/2025       Lab Results   Component Value Date    CHOL 202 (H) 04/18/2025    TRIG 128 04/18/2025    HDL 87 (H) 04/18/2025    LDL 93 04/18/2025       Assessment / Plan      Visit Diagnosis/Orders Placed This Visit:  Diagnoses and all orders for this visit:    1. Moderate episode of recurrent major depressive disorder (Primary)  -     Cariprazine HCl (VRAYLAR) 1.5 MG capsule capsule; Take 1 capsule 1.5mg every other day.  Dispense: 90 capsule; Refill: 1    2. Attention or concentration deficit  -     atomoxetine (Strattera) 18 MG capsule; Take 1 capsule by mouth Daily.  Dispense: 90 capsule; Refill: 1    3. Memory deficits    4. Generalized anxiety disorder  -     Cariprazine HCl (VRAYLAR) 1.5 MG capsule capsule; Take 1 capsule 1.5mg every other day.  Dispense: 90 capsule; Refill: 1         Assessment & Plan  1.  Attention and concentration.  She reports that Strattera has been helpful managing symptoms.     2. Mood   Her mood has been stable with the current regimen of Vraylar and Wellbutrin. She reports no recent panic attacks or outbursts. She is advised to continue her current medications and monitor for any changes in mood or new symptoms. She was also encouraged to participate in self-care activities and enjoyment.      Functional Status: Moderate impairment     Prognosis: Good with Ongoing Treatment      Impression/Formulation:  Patient appeared alert and oriented.  Patient is voluntarily requesting to continue outpatient psychiatric treatment at [facility] [address]  Patient is receptive to assistance with maintaining a stable lifestyle.  Patient presents with history of     ICD-10-CM ICD-9-CM   1. Moderate episode of recurrent major depressive disorder  F33.1 296.32   2. Attention or concentration deficit  R41.840 799.51   3. Memory deficits  R41.3 780.93   4. Generalized anxiety disorder  F41.1 300.02   .    Reviewed patient's previous provider notes. Reviewed most recent labs. Patient meets DSM V diagnostic criteria for diagnoses. Diagnoses may be updated as more information becomes available.       Treatment Plan:   Continue Wellbutrin XL 300mg PO qam, Strattera 18mg PO qd , and Vraylar 1.5mg PO every other day. Refilled Vraylar and Strattera  Patient was informed Provider's last day at practice is August 14. Patient verbalized understanding   Encouraged to continue psychotherapy  Follow-up in 3 months  and as needed    Patient will continue supportive psychotherapy efforts and medications as indicated. Clinic will obtain release of information for current treatment team for continuity of care as needed. Patient will contact this office, call 911 or present to the nearest emergency room should suicidal or homicidal ideations occur.  Discussed medication options and treatment plan of prescribed medication(s) as well as the risks, benefits, and potential side effects. Patient acknowledged and verbally consented to continue with current treatment plan and was educated on the importance of compliance with treatment and follow-up appointments.     Pt has no previous history of seizure or current eating disorder, which would be a contraindication for use. The possibility of activation with initiation was discussed and patient verbalizes understanding.    Medication risks and  side effects discussed with patient including risk for worsening mood, changes in behavior, thoughts of suicide or homicide, induction of bertin, serotonin syndrome, rare hyponatremia, rare SIADH, withdrawal symptoms if abrupt withdrawal, sexual dysfunction.   If any thoughts of SI or HI, worsening mood or changes in behavior, call 911 or crisis line [phone], or go to nearest ER at once. Patient agrees to notify close family/friend of new trial of antidepressants/info above. Pt.verbalizes understanding and consents to treatment with this medication.    All risks/benefits and side effects discussed with patient, including risk for weight gain, increased lipids, hyperprolactemia, EPS symptoms, including restlessness, muscle  stiffness or contraction of head, face, neck, trunk and limbs, and TD (which can be irreversible). Pt verbalizes understanding and consents to treatment with these medications.      Quality Measures:   Never smoker    I advised Colleen Morgan of the risks of tobacco use.     Follow Up:   Return in about 3 months (around 10/23/2025).      Patient or patient representative verbalized consent for the use of Ambient Listening during the visit with  DANIEL Tsai for chart documentation. 7/23/2025  16:18 EDT    DANIEL Tsai  [facility], [address]

## 2025-07-28 ENCOUNTER — TELEPHONE (OUTPATIENT)
Dept: FAMILY MEDICINE CLINIC | Facility: CLINIC | Age: 75
End: 2025-07-28
Payer: MEDICARE

## 2025-07-28 NOTE — TELEPHONE ENCOUNTER
Patient gets leg cramps with pravastatin.  Okay to proceed with trial of rosuvastatin 5 mg nightly.

## 2025-07-28 NOTE — TELEPHONE ENCOUNTER
Pharmacy Name:  [facility]     Reference Number (if applicable): [id number]    Pharmacy representative name: DAVID    Pharmacy representative phone number: [phone]    What medication are you calling in regards to: CRESTOR 5 MG     What question does the pharmacy have: PATIENT HAS AN ALLERGY TO PRAVASTATIN AND THIS MEDICATION IS IN THE SAME CATEGORY, PLEASE CALL THE PHARMACY TO DISCUSS.